# Patient Record
Sex: FEMALE | Race: WHITE | NOT HISPANIC OR LATINO | Employment: OTHER | URBAN - METROPOLITAN AREA
[De-identification: names, ages, dates, MRNs, and addresses within clinical notes are randomized per-mention and may not be internally consistent; named-entity substitution may affect disease eponyms.]

---

## 2017-02-09 RX ORDER — METHIMAZOLE 10 MG/1
TABLET ORAL
Qty: 60 TABLET | Refills: 1 | Status: SHIPPED | OUTPATIENT
Start: 2017-02-09 | End: 2017-02-22 | Stop reason: SDUPTHER

## 2017-02-17 ENCOUNTER — OFFICE VISIT (OUTPATIENT)
Dept: ENDOCRINOLOGY | Age: 62
End: 2017-02-17

## 2017-02-17 VITALS
WEIGHT: 218.6 LBS | BODY MASS INDEX: 40.23 KG/M2 | OXYGEN SATURATION: 92 % | DIASTOLIC BLOOD PRESSURE: 62 MMHG | HEART RATE: 93 BPM | HEIGHT: 62 IN | SYSTOLIC BLOOD PRESSURE: 112 MMHG

## 2017-02-17 DIAGNOSIS — E05.90 HYPERTHYROIDISM: Primary | ICD-10-CM

## 2017-02-17 DIAGNOSIS — R53.82 CHRONIC FATIGUE: ICD-10-CM

## 2017-02-17 DIAGNOSIS — Z79.52 CURRENT USE OF STEROID MEDICATION: ICD-10-CM

## 2017-02-17 DIAGNOSIS — M81.0 POST-MENOPAUSAL OSTEOPOROSIS: ICD-10-CM

## 2017-02-17 PROCEDURE — 99214 OFFICE O/P EST MOD 30 MIN: CPT | Performed by: INTERNAL MEDICINE

## 2017-02-17 RX ORDER — ZOLEDRONIC ACID 5 MG/100ML
5 INJECTION, SOLUTION INTRAVENOUS ONCE
Status: DISCONTINUED | OUTPATIENT
Start: 2017-02-17 | End: 2019-01-01

## 2017-02-17 NOTE — PROGRESS NOTES
61 y.o.    Patient Care Team:  Miriam Mercado Reyes, MD as PCP - General (Family Medicine)    Chief Complaint:        F/U HYPERTHYROIDISM.  NO RECENT LABS.  Subjective     HPI     Patient is a 61-year-old white female with a past history of hyperthyroidism and osteoporosis came for followup.  Hyperthyroidism.  Patient is currently taking methimazole 10 mg twice daily.  She reported significant improvement in her symptoms. She feeling better in general. He did, sweating, is decreased. Heat intolerance, is diminished. Tremors are also decreased.  Postmenopausal osteoporosis.  Patient is currently on Fosamax for the past few months. She reports significant gastroesophageal reflux symptoms for the past several weeks and is unable to take Fosamax.  Severe COPD with steroid dependence.  Patient is currently taking prednisone 10 mg daily, which also increases her risk for osteoporosis.    Chronic fatigue.  Patient constantly experiences Fatigue and has multiple reasons for that      Interval History       Summary  Patient reported that in August 2016 she was admitted to the hospital for severe tachycardia and was noted to have atrial fibrillation with rapid ventricular response. She was treated in hospital was started on Coumadin at that time.  She also has severe COPD and frequently received steroids. She is currently on continuous 10 mg prednisone dose daily  Patient reports occasional dysphagia, altered voice feels hot all the time and sweaty. Denies any diarrhea but frequent bowel movements. She is still constipated before that.  She also had swelling of the legs. There was no prior history of hypothyroidism.     Patient had menopause since age 44.  She did not take any Estrogens     Patient reports tremors, sweating, palpitations, for the past few years.  She also reported that she has been on prednisone 10 mg daily for at least 2-3 years.  She is currently not taking any vitamin D or calcium or bisphosphonates to  protect against osteoporosis. Patient has not had a DEXA scan in the past few years.     Patient's maternal aunt apparently has hypothyroidism.       The following portions of the patient's history were reviewed and updated as appropriate: allergies, current medications, past family history, past medical history, past social history, past surgical history and problem list.    Past Medical History   Diagnosis Date   • Anxiety and depression    • Hyperlipidemia      History reviewed. No pertinent family history.  Social History     Social History   • Marital status: Unknown     Spouse name: N/A   • Number of children: N/A   • Years of education: N/A     Occupational History   • Not on file.     Social History Main Topics   • Smoking status: Not on file   • Smokeless tobacco: Not on file   • Alcohol use Not on file   • Drug use: Not on file   • Sexual activity: Not on file     Other Topics Concern   • Not on file     Social History Narrative     No Known Allergies    Current Outpatient Prescriptions:   •  alendronate (FOSAMAX) 70 MG tablet, Take 1 tablet by mouth Every 7 (Seven) Days., Disp: 4 tablet, Rfl: 4  •  aspirin (aspirin EC) 81 MG EC tablet, Take 81 mg by mouth., Disp: , Rfl:   •  diltiaZEM (TIAZAC) 300 MG 24 hr capsule, , Disp: , Rfl:   •  escitalopram (LEXAPRO) 10 MG tablet, , Disp: , Rfl:   •  guaiFENesin (MUCINEX) 600 MG 12 hr tablet, Take 600 mg by mouth., Disp: , Rfl:   •  ipratropium-albuterol (DUO-NEB) 0.5-2.5 mg/mL nebulizer, Inhale 3 mL Every 4 (Four) Hours., Disp: , Rfl:   •  Loratadine 10 MG capsule, Take 10 mg by mouth., Disp: , Rfl:   •  methIMAzole (TAPAZOLE) 10 MG tablet, TAKE ONE TABLET BY MOUTH TWICE A DAY WITH MEALS FOR THYROID, Disp: 60 tablet, Rfl: 1  •  predniSONE (DELTASONE) 20 MG tablet, , Disp: , Rfl:   •  VENTOLIN  (90 BASE) MCG/ACT inhaler, , Disp: , Rfl:   •  warfarin (COUMADIN) 5 MG tablet, , Disp: , Rfl:         Review of Systems   Constitutional: Negative for chills,  "fatigue and fever.   Cardiovascular: Negative for chest pain and palpitations.   Gastrointestinal: Positive for constipation. Negative for abdominal pain, diarrhea, nausea and vomiting.   Endocrine: Negative for cold intolerance and heat intolerance.   All other systems reviewed and are negative.      Objective       Vitals:    02/17/17 1411   BP: 112/62   Pulse: 93   SpO2: 92%   Weight: 218 lb 9.6 oz (99.2 kg)   Height: 62\" (157.5 cm)     Body mass index is 39.98 kg/(m^2).      Physical Exam   Constitutional: She is oriented to person, place, and time. She appears well-developed and well-nourished.   HENT:   Head: Normocephalic and atraumatic.   Eyes: EOM are normal. Pupils are equal, round, and reactive to light.   No proptosis or lid lag noted   Neck: Normal range of motion. Neck supple. Thyromegaly (mild thyromegaly noted nontender, moving on swallowing) present.   Cardiovascular: Normal rate, regular rhythm, normal heart sounds and intact distal pulses.    Pulmonary/Chest: Effort normal and breath sounds normal.   Abdominal: Soft. Bowel sounds are normal. She exhibits distension. There is no tenderness.   Musculoskeletal: Normal range of motion. She exhibits no edema.   Lymphadenopathy:     She has no cervical adenopathy.   Neurological: She is alert and oriented to person, place, and time. She has normal reflexes.   Skin: Skin is warm and dry. No rash noted.   Psychiatric: She has a normal mood and affect. Her behavior is normal.   Nursing note and vitals reviewed.    Results Review:     I reviewed the patient's new clinical results.    Medical records reviewed  Summary:      Office Visit on 11/07/2016   Component Date Value Ref Range Status   • Glucose 11/07/2016 107* 65 - 99 mg/dL Final   • BUN 11/07/2016 7  6 - 20 mg/dL Final   • Creatinine 11/07/2016 0.68  0.57 - 1.00 mg/dL Final   • eGFR Non  Am 11/07/2016 89  >60 mL/min/1.73 Final   • eGFR African Am 11/07/2016 107  >60 mL/min/1.73 Final   • " BUN/Creatinine Ratio 11/07/2016 10.3  7.0 - 25.0 Final   • Sodium 11/07/2016 141  136 - 145 mmol/L Final   • Potassium 11/07/2016 4.8  3.5 - 5.2 mmol/L Final   • Chloride 11/07/2016 97* 98 - 107 mmol/L Final   • Total CO2 11/07/2016 27.6  22.0 - 29.0 mmol/L Final   • Calcium 11/07/2016 10.2  8.6 - 10.5 mg/dL Final   • Total Protein 11/07/2016 7.0  6.0 - 8.5 g/dL Final   • Albumin 11/07/2016 4.60  3.50 - 5.20 g/dL Final   • Globulin 11/07/2016 2.4  gm/dL Final   • A/G Ratio 11/07/2016 1.9  g/dL Final   • Total Bilirubin 11/07/2016 0.2  0.1 - 1.2 mg/dL Final   • Alkaline Phosphatase 11/07/2016 93  39 - 117 U/L Final   • AST (SGOT) 11/07/2016 13  1 - 32 U/L Final   • ALT (SGPT) 11/07/2016 17  1 - 33 U/L Final   • PTH, Intact 11/07/2016 29  15 - 65 pg/mL Final   • Free T4 11/07/2016 1.96* 0.93 - 1.70 ng/dL Final   • T3, Total 11/07/2016 253.0* 80.0 - 200.0 ng/dL Final   • TSH 11/07/2016 <0.010* 0.270 - 4.200 mIU/mL Final   • Thyroid Stimulating Immunoglobulin 11/07/2016 664* 0 - 139 % Final   • Thyroid Peroxidase Antibody 11/07/2016 9  0 - 34 IU/mL Final   • 25 Hydroxy, Vitamin D 11/07/2016 19.1* 30.0 - 100.0 ng/mL Final    Comment: Reference Range for Total Vitamin D 25(OH)  Deficiency    <20.0 ng/mL  Insufficiency 21-29 ng/mL  Sufficiency    ng/mL  Toxicity      >100 ng/ml          No results found for: HGBA1C  Lab Results   Component Value Date    CREATININE 0.68 11/07/2016     Imaging Results (most recent)     None                Assessment and Plan:    Leonor was seen today for hyperthyroidism.    Diagnoses and all orders for this visit:    Hyperthyroidism  -     T4, Free  -     TSH  -     Comprehensive Metabolic Panel  -     CBC & Differential    Post-menopausal osteoporosis  -     T4, Free  -     TSH  -     Comprehensive Metabolic Panel  -     CBC & Differential    Current use of steroid medication  -     T4, Free  -     TSH  -     Comprehensive Metabolic Panel  -     CBC & Differential    Chronic  fatigue  -     T4, Free  -     TSH  -     Comprehensive Metabolic Panel  -     CBC & Differential      Patient reported that she started feeling better after starting methimazole.  She's currently taking 10 mg twice daily  She reports that heat intolerance has diminished as well as tremors  Her sweating episodes have also diminished significantly  She's feeling much better    She denied any side effects on the medication    She reported that after the first few doses Fosamax started upsetting her stomach a lot.  She is having constant gastroesophageal reflux disease symptoms after taking Fosamax  I think she is ready for Reclast intravenous use  I explained this to the patient she verbalized understanding and is agreeable for that   I will schedule Reclast injection in the infusion center  Patient will get lab testing done today  She will return to follow-up in 3-4 months.    The total time spent for old record and lab review and face- to- face was more than 25 min of which greater than 50% of time was spent on counseling the patient on recommended evaluation and treatment options, instructions for management/treatment and /or follow up  and importance of compliance with chosen management or treatment options    Hector Anton MD. FACE    02/17/17      EMR Dragon / transcription disclaimer:    Much of this encounter note is an electronic transcription/ translation of spoken language to printed text.  Electronic translation of spoken language may permit erroneous, or at times, nonsensical words or phrases to be inadvertently transcribed; although I have reviewed the note for such errors, some may still exist.

## 2017-02-18 LAB
ALBUMIN SERPL-MCNC: 4.4 G/DL (ref 3.5–5.2)
ALBUMIN/GLOB SERPL: 1.5 G/DL
ALP SERPL-CCNC: 94 U/L (ref 39–117)
ALT SERPL-CCNC: 13 U/L (ref 1–33)
AST SERPL-CCNC: 14 U/L (ref 1–32)
BASOPHILS # BLD AUTO: 0.03 10*3/MM3 (ref 0–0.2)
BASOPHILS NFR BLD AUTO: 0.2 % (ref 0–1.5)
BILIRUB SERPL-MCNC: <0.2 MG/DL (ref 0.1–1.2)
BUN SERPL-MCNC: 12 MG/DL (ref 6–20)
BUN/CREAT SERPL: 14.6 (ref 7–25)
CALCIUM SERPL-MCNC: 9.9 MG/DL (ref 8.6–10.5)
CHLORIDE SERPL-SCNC: 99 MMOL/L (ref 98–107)
CO2 SERPL-SCNC: 25.1 MMOL/L (ref 22–29)
CREAT SERPL-MCNC: 0.82 MG/DL (ref 0.57–1)
EOSINOPHIL # BLD AUTO: 0.66 10*3/MM3 (ref 0–0.7)
EOSINOPHIL NFR BLD AUTO: 4.2 % (ref 0.3–6.2)
ERYTHROCYTE [DISTWIDTH] IN BLOOD BY AUTOMATED COUNT: 17.8 % (ref 11.7–13)
GLOBULIN SER CALC-MCNC: 3 GM/DL
GLUCOSE SERPL-MCNC: 122 MG/DL (ref 65–99)
HCT VFR BLD AUTO: 44 % (ref 35.6–45.5)
HGB BLD-MCNC: 13.5 G/DL (ref 11.9–15.5)
IMM GRANULOCYTES # BLD: 0.06 10*3/MM3 (ref 0–0.03)
IMM GRANULOCYTES NFR BLD: 0.4 % (ref 0–0.5)
LYMPHOCYTES # BLD AUTO: 2.73 10*3/MM3 (ref 0.9–4.8)
LYMPHOCYTES NFR BLD AUTO: 17.3 % (ref 19.6–45.3)
MCH RBC QN AUTO: 25.5 PG (ref 26.9–32)
MCHC RBC AUTO-ENTMCNC: 30.7 G/DL (ref 32.4–36.3)
MCV RBC AUTO: 83 FL (ref 80.5–98.2)
MONOCYTES # BLD AUTO: 1.12 10*3/MM3 (ref 0.2–1.2)
MONOCYTES NFR BLD AUTO: 7.1 % (ref 5–12)
NEUTROPHILS # BLD AUTO: 11.16 10*3/MM3 (ref 1.9–8.1)
NEUTROPHILS NFR BLD AUTO: 70.8 % (ref 42.7–76)
PLATELET # BLD AUTO: 483 10*3/MM3 (ref 140–500)
POTASSIUM SERPL-SCNC: 5 MMOL/L (ref 3.5–5.2)
PROT SERPL-MCNC: 7.4 G/DL (ref 6–8.5)
RBC # BLD AUTO: 5.3 10*6/MM3 (ref 3.9–5.2)
SODIUM SERPL-SCNC: 139 MMOL/L (ref 136–145)
T4 FREE SERPL-MCNC: 0.41 NG/DL (ref 0.93–1.7)
TSH SERPL DL<=0.005 MIU/L-ACNC: 2.2 MIU/ML (ref 0.27–4.2)
WBC # BLD AUTO: 15.76 10*3/MM3 (ref 4.5–10.7)

## 2017-02-20 DIAGNOSIS — M81.0 POST-MENOPAUSAL OSTEOPOROSIS: ICD-10-CM

## 2017-02-20 DIAGNOSIS — R53.82 CHRONIC FATIGUE: ICD-10-CM

## 2017-02-20 DIAGNOSIS — Z79.52 CURRENT USE OF STEROID MEDICATION: ICD-10-CM

## 2017-02-20 DIAGNOSIS — E05.90 HYPERTHYROIDISM: Primary | ICD-10-CM

## 2017-02-22 RX ORDER — METHIMAZOLE 10 MG/1
5 TABLET ORAL
Qty: 30 TABLET | Refills: 3 | Status: ON HOLD | OUTPATIENT
Start: 2017-02-22 | End: 2019-01-01

## 2017-02-27 ENCOUNTER — HOSPITAL ENCOUNTER (OUTPATIENT)
Dept: INFUSION THERAPY | Facility: HOSPITAL | Age: 62
Discharge: HOME | End: 2017-02-27
Attending: INTERNAL MEDICINE | Admitting: INTERNAL MEDICINE

## 2017-02-27 VITALS
RESPIRATION RATE: 24 BRPM | HEIGHT: 62 IN | TEMPERATURE: 98 F | SYSTOLIC BLOOD PRESSURE: 132 MMHG | WEIGHT: 218 LBS | OXYGEN SATURATION: 94 % | BODY MASS INDEX: 40.12 KG/M2 | HEART RATE: 82 BPM | DIASTOLIC BLOOD PRESSURE: 76 MMHG

## 2017-02-27 DIAGNOSIS — Z79.52 CURRENT USE OF STEROID MEDICATION: ICD-10-CM

## 2017-02-27 DIAGNOSIS — E05.90 HYPERTHYROIDISM: Primary | ICD-10-CM

## 2017-02-27 DIAGNOSIS — R53.82 CHRONIC FATIGUE: ICD-10-CM

## 2017-02-27 DIAGNOSIS — M81.0 POST-MENOPAUSAL OSTEOPOROSIS: ICD-10-CM

## 2017-02-27 PROCEDURE — 96365 THER/PROPH/DIAG IV INF INIT: CPT

## 2017-02-27 PROCEDURE — 25010000002 ZOLEDRONIC ACID 5 MG/100ML SOLUTION: Performed by: INTERNAL MEDICINE

## 2017-02-27 RX ORDER — ZOLEDRONIC ACID 5 MG/100ML
5 INJECTION, SOLUTION INTRAVENOUS ONCE
OUTPATIENT
Start: 2018-02-27

## 2017-02-27 RX ORDER — ZOLEDRONIC ACID 5 MG/100ML
5 INJECTION, SOLUTION INTRAVENOUS ONCE
Status: COMPLETED | OUTPATIENT
Start: 2017-02-27 | End: 2017-02-27

## 2017-02-27 RX ADMIN — ZOLEDRONIC ACID 5 MG: 5 INJECTION, SOLUTION INTRAVENOUS at 11:02

## 2017-03-16 ENCOUNTER — OFFICE VISIT (OUTPATIENT)
Dept: ENDOCRINOLOGY | Age: 62
End: 2017-03-16

## 2017-03-16 DIAGNOSIS — E05.90 HYPERTHYROIDISM: Primary | ICD-10-CM

## 2017-03-16 PROCEDURE — 76536 US EXAM OF HEAD AND NECK: CPT | Performed by: INTERNAL MEDICINE

## 2017-03-16 NOTE — PROGRESS NOTES
[facility]                                                                                                                                       Ultrasound Thyroid/ Soft tissue Neck Report    [id number]  Leonor Elisabet    1955    [facility] MD:      03/16/2017    INDICATION: Hyperthyroidism    Explanation of Procedure::    Using physician - performed, real-time ultrasound, limited to the thyroid gland and the anterior neck , longitudinal and transverse images were obtained of both lobes and isthmus and power doppler was used to assess the vascularity and the findings are as follows:    Comparison to previous study  No previous study available for comparison  Thyroid size and Measurements: All measurements are expressed as longitudinal X AP X  transverse unless otherwise specified  The thyroid gland is enlarged right lobe greater than left.  Overall echogenicity is hypo-2 isoechoic with heterogeneous appearance  The gland also has a multinodular structure with nodules in both lobes  There are at least 2 nodules in both lobes that are hyperechoic and mixed consistency    Right Lobe: Measures 6.88 cm x 2.15 cm x 2.83 cm    Left lobe: Measures 5.09 cm x 1.47 cm x 1.45 cm    Isthmus: Measures 0.3 cm    Thyroid Parenchyma    Description of Pathology:    Nodules and characteristics    #1 .  There is a hyperechoic nodular area in the lower part of the right lobe measuring 3.7 x 2.97 cm somewhat of a mixed consistency and with increasing vascularity with prominent central flow.  There is also a suggestion of microcalcifications in the nodule superiorly    #2 multiple small nodules noted in the left lobe and the dominant nodule is 0.85 cm x 0.92 cm in size, hyperechoic with central clearance and minimal vascularity    #3 several other smaller nodules noted in the right lobe of less than 5 mm size hypoechoic and cystic in  consistency          Impression  Asymmetrical enlarged thyroid gland with the right lobe measuring 6.88 cm.  The gland has generalized hypoechoic texture with a dominant nodule in the right lobe measuring 3.7 x 2.9 cm.  The nodule itself is hyperechoic and of mixed consistency with increased vascularity to the Center and also with the appearance  of microcalcifications in the nodule superiorly  There is also a nodule in the left lobe of 0.85 x 0.92 cm once again hyperechoic with central clearance without any characteristic vascularity  Several other small nodules of less than 5 mm noted bilaterally    Recommendation / Followup    The dominant nodule in both lobes  are amenable for fine needle aspiration biopsy for further clarification  Recommend fine needle aspiration biopsy

## 2017-03-17 LAB
T4 FREE SERPL-MCNC: 0.59 NG/DL (ref 0.93–1.7)
TSH SERPL DL<=0.005 MIU/L-ACNC: 5.26 MIU/ML (ref 0.27–4.2)

## 2017-05-10 ENCOUNTER — APPOINTMENT (OUTPATIENT)
Dept: WOMENS IMAGING | Facility: HOSPITAL | Age: 62
End: 2017-05-10

## 2017-05-10 PROCEDURE — 77063 BREAST TOMOSYNTHESIS BI: CPT | Performed by: RADIOLOGY

## 2017-05-10 PROCEDURE — G0202 SCR MAMMO BI INCL CAD: HCPCS | Performed by: RADIOLOGY

## 2018-10-03 RX ORDER — METHIMAZOLE 10 MG/1
TABLET ORAL
Qty: 30 TABLET | Refills: 2 | OUTPATIENT
Start: 2018-10-03

## 2019-01-01 ENCOUNTER — APPOINTMENT (OUTPATIENT)
Dept: GENERAL RADIOLOGY | Facility: HOSPITAL | Age: 64
End: 2019-01-01

## 2019-01-01 ENCOUNTER — READMISSION MANAGEMENT (OUTPATIENT)
Dept: CALL CENTER | Facility: HOSPITAL | Age: 64
End: 2019-01-01

## 2019-01-01 ENCOUNTER — HOSPITAL ENCOUNTER (INPATIENT)
Facility: HOSPITAL | Age: 64
LOS: 3 days | Discharge: HOME HEALTH | End: 2019-02-23
Attending: EMERGENCY MEDICINE | Admitting: INTERNAL MEDICINE

## 2019-01-01 ENCOUNTER — TRANSCRIBE ORDERS (OUTPATIENT)
Dept: ADMINISTRATIVE | Facility: HOSPITAL | Age: 64
End: 2019-01-01

## 2019-01-01 ENCOUNTER — HOSPITAL ENCOUNTER (INPATIENT)
Facility: HOSPITAL | Age: 64
LOS: 7 days | Discharge: HOME | End: 2019-12-15
Attending: EMERGENCY MEDICINE | Admitting: INTERNAL MEDICINE

## 2019-01-01 ENCOUNTER — LAB (OUTPATIENT)
Dept: LAB | Facility: HOSPITAL | Age: 64
End: 2019-01-01

## 2019-01-01 VITALS
BODY MASS INDEX: 29.78 KG/M2 | OXYGEN SATURATION: 91 % | HEIGHT: 62 IN | DIASTOLIC BLOOD PRESSURE: 69 MMHG | HEART RATE: 99 BPM | TEMPERATURE: 97.9 F | SYSTOLIC BLOOD PRESSURE: 129 MMHG | WEIGHT: 161.82 LBS | RESPIRATION RATE: 22 BRPM

## 2019-01-01 VITALS
TEMPERATURE: 98.2 F | SYSTOLIC BLOOD PRESSURE: 130 MMHG | BODY MASS INDEX: 31.28 KG/M2 | OXYGEN SATURATION: 97 % | HEIGHT: 61 IN | WEIGHT: 165.7 LBS | HEART RATE: 101 BPM | RESPIRATION RATE: 22 BRPM | DIASTOLIC BLOOD PRESSURE: 67 MMHG

## 2019-01-01 DIAGNOSIS — I48.91 RAPID ATRIAL FIBRILLATION (HCC): Primary | ICD-10-CM

## 2019-01-01 DIAGNOSIS — I48.0 PAROXYSMAL ATRIAL FIBRILLATION WITH RAPID VENTRICULAR RESPONSE (HCC): Chronic | ICD-10-CM

## 2019-01-01 DIAGNOSIS — J96.02 ACUTE RESPIRATORY ACIDOSIS (HCC): ICD-10-CM

## 2019-01-01 DIAGNOSIS — J96.02 ACUTE HYPERCAPNIC RESPIRATORY FAILURE (HCC): ICD-10-CM

## 2019-01-01 DIAGNOSIS — J96.02 ACUTE RESPIRATORY FAILURE WITH HYPOXIA AND HYPERCAPNIA (HCC): Primary | ICD-10-CM

## 2019-01-01 DIAGNOSIS — J96.01 ACUTE RESPIRATORY FAILURE WITH HYPOXIA AND HYPERCAPNIA (HCC): Primary | ICD-10-CM

## 2019-01-01 DIAGNOSIS — J44.1 COPD WITH ACUTE EXACERBATION (HCC): ICD-10-CM

## 2019-01-01 DIAGNOSIS — J96.21 ACUTE ON CHRONIC RESPIRATORY FAILURE WITH HYPOXIA AND HYPERCAPNIA (HCC): ICD-10-CM

## 2019-01-01 DIAGNOSIS — J96.22 ACUTE ON CHRONIC RESPIRATORY FAILURE WITH HYPOXIA AND HYPERCAPNIA (HCC): ICD-10-CM

## 2019-01-01 DIAGNOSIS — Z01.818 PRE-OP TESTING: Primary | ICD-10-CM

## 2019-01-01 DIAGNOSIS — Z01.818 PRE-OP TESTING: ICD-10-CM

## 2019-01-01 DIAGNOSIS — M62.81 MUSCLE WEAKNESS (GENERALIZED): ICD-10-CM

## 2019-01-01 LAB
ALBUMIN SERPL-MCNC: 3.1 G/DL (ref 3.5–5.2)
ALBUMIN SERPL-MCNC: 3.1 G/DL (ref 3.5–5.2)
ALBUMIN SERPL-MCNC: 3.2 G/DL (ref 3.5–5.2)
ALBUMIN SERPL-MCNC: 3.4 G/DL (ref 3.5–5.2)
ALBUMIN SERPL-MCNC: 3.7 G/DL (ref 3.5–5.2)
ALBUMIN SERPL-MCNC: 3.9 G/DL (ref 3.5–5.2)
ALBUMIN/GLOB SERPL: 1 G/DL
ALBUMIN/GLOB SERPL: 1.1 G/DL
ALBUMIN/GLOB SERPL: 1.2 G/DL
ALP SERPL-CCNC: 106 U/L (ref 39–117)
ALP SERPL-CCNC: 56 U/L (ref 39–117)
ALP SERPL-CCNC: 79 U/L (ref 39–117)
ALT SERPL W P-5'-P-CCNC: 18 U/L (ref 1–33)
ALT SERPL W P-5'-P-CCNC: 21 U/L (ref 1–33)
ALT SERPL W P-5'-P-CCNC: 24 U/L (ref 1–33)
ANION GAP SERPL CALCULATED.3IONS-SCNC: 10.4 MMOL/L (ref 5–15)
ANION GAP SERPL CALCULATED.3IONS-SCNC: 11.4 MMOL/L (ref 5–15)
ANION GAP SERPL CALCULATED.3IONS-SCNC: 11.5 MMOL/L (ref 5–15)
ANION GAP SERPL CALCULATED.3IONS-SCNC: 16.9 MMOL/L
ANION GAP SERPL CALCULATED.3IONS-SCNC: 17.7 MMOL/L (ref 5–15)
ANION GAP SERPL CALCULATED.3IONS-SCNC: 6.5 MMOL/L (ref 5–15)
ANION GAP SERPL CALCULATED.3IONS-SCNC: 8.6 MMOL/L (ref 5–15)
ANION GAP SERPL CALCULATED.3IONS-SCNC: 8.8 MMOL/L (ref 5–15)
ANION GAP SERPL CALCULATED.3IONS-SCNC: 9.7 MMOL/L
ANION GAP SERPL CALCULATED.3IONS-SCNC: 9.9 MMOL/L (ref 5–15)
ANISOCYTOSIS BLD QL: ABNORMAL
ARTERIAL PATENCY WRIST A: POSITIVE
AST SERPL-CCNC: 14 U/L (ref 1–32)
AST SERPL-CCNC: 28 U/L (ref 1–32)
AST SERPL-CCNC: 37 U/L (ref 1–32)
ATMOSPHERIC PRESS: 743.3 MMHG
ATMOSPHERIC PRESS: 750.9 MMHG
ATMOSPHERIC PRESS: 751.1 MMHG
ATMOSPHERIC PRESS: 755.2 MMHG
ATMOSPHERIC PRESS: 755.4 MMHG
ATMOSPHERIC PRESS: 756.3 MMHG
B PARAPERT DNA SPEC QL NAA+PROBE: NOT DETECTED
B PARAPERT DNA SPEC QL NAA+PROBE: NOT DETECTED
B PERT DNA SPEC QL NAA+PROBE: NOT DETECTED
B PERT DNA SPEC QL NAA+PROBE: NOT DETECTED
BACTERIA SPEC AEROBE CULT: NORMAL
BACTERIA SPEC AEROBE CULT: NORMAL
BASE EXCESS BLDA CALC-SCNC: -2.2 MMOL/L (ref 0–2)
BASE EXCESS BLDA CALC-SCNC: -2.4 MMOL/L (ref 0–2)
BASE EXCESS BLDA CALC-SCNC: 0 MMOL/L (ref 0–2)
BASE EXCESS BLDA CALC-SCNC: 2.2 MMOL/L (ref 0–2)
BASE EXCESS BLDA CALC-SCNC: 2.4 MMOL/L (ref 0–2)
BASE EXCESS BLDA CALC-SCNC: 8.2 MMOL/L (ref 0–2)
BASOPHILS # BLD AUTO: 0.01 10*3/MM3 (ref 0–0.2)
BASOPHILS # BLD AUTO: 0.02 10*3/MM3 (ref 0–0.2)
BASOPHILS # BLD AUTO: 0.02 10*3/MM3 (ref 0–0.2)
BASOPHILS # BLD AUTO: 0.04 10*3/MM3 (ref 0–0.2)
BASOPHILS NFR BLD AUTO: 0.1 % (ref 0–1.5)
BASOPHILS NFR BLD AUTO: 0.2 % (ref 0–1.5)
BASOPHILS NFR BLD AUTO: 0.3 % (ref 0–1.5)
BDY SITE: ABNORMAL
BILIRUB SERPL-MCNC: 0.3 MG/DL (ref 0.2–1.2)
BILIRUB SERPL-MCNC: 0.4 MG/DL (ref 0.1–1.2)
BILIRUB SERPL-MCNC: 0.4 MG/DL (ref 0.2–1.2)
BUN BLD-MCNC: 10 MG/DL (ref 8–23)
BUN BLD-MCNC: 16 MG/DL (ref 8–23)
BUN BLD-MCNC: 17 MG/DL (ref 8–23)
BUN BLD-MCNC: 17 MG/DL (ref 8–23)
BUN BLD-MCNC: 18 MG/DL (ref 8–23)
BUN BLD-MCNC: 21 MG/DL (ref 8–23)
BUN BLD-MCNC: 23 MG/DL (ref 8–23)
BUN BLD-MCNC: 24 MG/DL (ref 8–23)
BUN BLD-MCNC: 6 MG/DL (ref 8–23)
BUN BLD-MCNC: 9 MG/DL (ref 8–23)
BUN/CREAT SERPL: 17.6 (ref 7–25)
BUN/CREAT SERPL: 22.7 (ref 7–25)
BUN/CREAT SERPL: 36.4 (ref 7–25)
BUN/CREAT SERPL: 52.2 (ref 7–25)
BUN/CREAT SERPL: 53.1 (ref 7–25)
BUN/CREAT SERPL: 56.7 (ref 7–25)
BUN/CREAT SERPL: 58.3 (ref 7–25)
BUN/CREAT SERPL: 62.2 (ref 7–25)
BUN/CREAT SERPL: 64.3 (ref 7–25)
BUN/CREAT SERPL: 9.1 (ref 7–25)
C PNEUM DNA NPH QL NAA+NON-PROBE: NOT DETECTED
C PNEUM DNA NPH QL NAA+NON-PROBE: NOT DETECTED
CALCIUM SPEC-SCNC: 8.5 MG/DL (ref 8.6–10.5)
CALCIUM SPEC-SCNC: 8.6 MG/DL (ref 8.6–10.5)
CALCIUM SPEC-SCNC: 8.6 MG/DL (ref 8.6–10.5)
CALCIUM SPEC-SCNC: 8.8 MG/DL (ref 8.6–10.5)
CALCIUM SPEC-SCNC: 9 MG/DL (ref 8.6–10.5)
CALCIUM SPEC-SCNC: 9.3 MG/DL (ref 8.6–10.5)
CALCIUM SPEC-SCNC: 9.4 MG/DL (ref 8.6–10.5)
CALCIUM SPEC-SCNC: 9.7 MG/DL (ref 8.6–10.5)
CHLORIDE SERPL-SCNC: 100 MMOL/L (ref 98–107)
CHLORIDE SERPL-SCNC: 100 MMOL/L (ref 98–107)
CHLORIDE SERPL-SCNC: 101 MMOL/L (ref 98–107)
CHLORIDE SERPL-SCNC: 101 MMOL/L (ref 98–107)
CHLORIDE SERPL-SCNC: 102 MMOL/L (ref 98–107)
CHLORIDE SERPL-SCNC: 102 MMOL/L (ref 98–107)
CHLORIDE SERPL-SCNC: 103 MMOL/L (ref 98–107)
CHLORIDE SERPL-SCNC: 104 MMOL/L (ref 98–107)
CHLORIDE SERPL-SCNC: 105 MMOL/L (ref 98–107)
CHLORIDE SERPL-SCNC: 97 MMOL/L (ref 98–107)
CO2 SERPL-SCNC: 23.1 MMOL/L (ref 22–29)
CO2 SERPL-SCNC: 23.3 MMOL/L (ref 22–29)
CO2 SERPL-SCNC: 27.3 MMOL/L (ref 22–29)
CO2 SERPL-SCNC: 27.5 MMOL/L (ref 22–29)
CO2 SERPL-SCNC: 27.6 MMOL/L (ref 22–29)
CO2 SERPL-SCNC: 28.1 MMOL/L (ref 22–29)
CO2 SERPL-SCNC: 28.6 MMOL/L (ref 22–29)
CO2 SERPL-SCNC: 30.2 MMOL/L (ref 22–29)
CO2 SERPL-SCNC: 30.4 MMOL/L (ref 22–29)
CO2 SERPL-SCNC: 30.5 MMOL/L (ref 22–29)
CREAT BLD-MCNC: 0.28 MG/DL (ref 0.57–1)
CREAT BLD-MCNC: 0.3 MG/DL (ref 0.57–1)
CREAT BLD-MCNC: 0.32 MG/DL (ref 0.57–1)
CREAT BLD-MCNC: 0.36 MG/DL (ref 0.57–1)
CREAT BLD-MCNC: 0.37 MG/DL (ref 0.57–1)
CREAT BLD-MCNC: 0.44 MG/DL (ref 0.57–1)
CREAT BLD-MCNC: 0.44 MG/DL (ref 0.57–1)
CREAT BLD-MCNC: 0.46 MG/DL (ref 0.57–1)
CREAT BLD-MCNC: 0.51 MG/DL (ref 0.57–1)
CREAT BLD-MCNC: 0.66 MG/DL (ref 0.57–1)
D-LACTATE SERPL-SCNC: 0.8 MMOL/L (ref 0.5–2)
D-LACTATE SERPL-SCNC: 4.8 MMOL/L (ref 0.5–2)
DEPRECATED RDW RBC AUTO: 39.3 FL (ref 37–54)
DEPRECATED RDW RBC AUTO: 39.7 FL (ref 37–54)
DEPRECATED RDW RBC AUTO: 40.2 FL (ref 37–54)
DEPRECATED RDW RBC AUTO: 40.3 FL (ref 37–54)
DEPRECATED RDW RBC AUTO: 40.3 FL (ref 37–54)
DEPRECATED RDW RBC AUTO: 41.4 FL (ref 37–54)
DEPRECATED RDW RBC AUTO: 41.7 FL (ref 37–54)
DEPRECATED RDW RBC AUTO: 41.8 FL (ref 37–54)
DEPRECATED RDW RBC AUTO: 52.7 FL (ref 37–54)
DIGOXIN SERPL-MCNC: 0.6 NG/ML (ref 0.6–1.2)
DIGOXIN SERPL-MCNC: 1 NG/ML (ref 0.6–1.2)
EOSINOPHIL # BLD AUTO: 0 10*3/MM3 (ref 0–0.4)
EOSINOPHIL # BLD AUTO: 0.15 10*3/MM3 (ref 0–0.4)
EOSINOPHIL # BLD MANUAL: 0.62 10*3/MM3 (ref 0–0.4)
EOSINOPHIL NFR BLD AUTO: 0 % (ref 0.3–6.2)
EOSINOPHIL NFR BLD AUTO: 1.1 % (ref 0.3–6.2)
EOSINOPHIL NFR BLD MANUAL: 4 % (ref 0.3–6.2)
ERYTHROCYTE [DISTWIDTH] IN BLOOD BY AUTOMATED COUNT: 13.9 % (ref 12.3–15.4)
ERYTHROCYTE [DISTWIDTH] IN BLOOD BY AUTOMATED COUNT: 14.1 % (ref 12.3–15.4)
ERYTHROCYTE [DISTWIDTH] IN BLOOD BY AUTOMATED COUNT: 14.1 % (ref 12.3–15.4)
ERYTHROCYTE [DISTWIDTH] IN BLOOD BY AUTOMATED COUNT: 14.2 % (ref 12.3–15.4)
ERYTHROCYTE [DISTWIDTH] IN BLOOD BY AUTOMATED COUNT: 14.4 % (ref 12.3–15.4)
ERYTHROCYTE [DISTWIDTH] IN BLOOD BY AUTOMATED COUNT: 14.6 % (ref 12.3–15.4)
ERYTHROCYTE [DISTWIDTH] IN BLOOD BY AUTOMATED COUNT: 16.8 % (ref 12.3–15.4)
FLUAV H1 2009 PAND RNA NPH QL NAA+PROBE: NOT DETECTED
FLUAV H1 2009 PAND RNA NPH QL NAA+PROBE: NOT DETECTED
FLUAV H1 HA GENE NPH QL NAA+PROBE: NOT DETECTED
FLUAV H1 HA GENE NPH QL NAA+PROBE: NOT DETECTED
FLUAV H3 RNA NPH QL NAA+PROBE: NOT DETECTED
FLUAV H3 RNA NPH QL NAA+PROBE: NOT DETECTED
FLUAV SUBTYP SPEC NAA+PROBE: NOT DETECTED
FLUAV SUBTYP SPEC NAA+PROBE: NOT DETECTED
FLUBV RNA ISLT QL NAA+PROBE: NOT DETECTED
FLUBV RNA ISLT QL NAA+PROBE: NOT DETECTED
GAS FLOW AIRWAY: 4 LPM
GFR SERPL CREATININE-BSD FRML MDRD: 122 ML/MIN/1.73
GFR SERPL CREATININE-BSD FRML MDRD: 138 ML/MIN/1.73
GFR SERPL CREATININE-BSD FRML MDRD: 145 ML/MIN/1.73
GFR SERPL CREATININE-BSD FRML MDRD: 145 ML/MIN/1.73
GFR SERPL CREATININE-BSD FRML MDRD: 91 ML/MIN/1.73
GFR SERPL CREATININE-BSD FRML MDRD: >150 ML/MIN/1.73
GLOBULIN UR ELPH-MCNC: 2.8 GM/DL
GLOBULIN UR ELPH-MCNC: 3.1 GM/DL
GLOBULIN UR ELPH-MCNC: 3.9 GM/DL
GLUCOSE BLD-MCNC: 117 MG/DL (ref 65–99)
GLUCOSE BLD-MCNC: 122 MG/DL (ref 65–99)
GLUCOSE BLD-MCNC: 149 MG/DL (ref 65–99)
GLUCOSE BLD-MCNC: 151 MG/DL (ref 65–99)
GLUCOSE BLD-MCNC: 160 MG/DL (ref 65–99)
GLUCOSE BLD-MCNC: 171 MG/DL (ref 65–99)
GLUCOSE BLD-MCNC: 178 MG/DL (ref 65–99)
GLUCOSE BLD-MCNC: 182 MG/DL (ref 65–99)
GLUCOSE BLD-MCNC: 196 MG/DL (ref 65–99)
GLUCOSE BLD-MCNC: 279 MG/DL (ref 65–99)
GLUCOSE BLDC GLUCOMTR-MCNC: 108 MG/DL (ref 70–130)
GLUCOSE BLDC GLUCOMTR-MCNC: 108 MG/DL (ref 70–130)
GLUCOSE BLDC GLUCOMTR-MCNC: 116 MG/DL (ref 70–130)
GLUCOSE BLDC GLUCOMTR-MCNC: 117 MG/DL (ref 70–130)
GLUCOSE BLDC GLUCOMTR-MCNC: 121 MG/DL (ref 70–130)
GLUCOSE BLDC GLUCOMTR-MCNC: 122 MG/DL (ref 70–130)
GLUCOSE BLDC GLUCOMTR-MCNC: 123 MG/DL (ref 70–130)
GLUCOSE BLDC GLUCOMTR-MCNC: 124 MG/DL (ref 70–130)
GLUCOSE BLDC GLUCOMTR-MCNC: 133 MG/DL (ref 70–130)
GLUCOSE BLDC GLUCOMTR-MCNC: 136 MG/DL (ref 70–130)
GLUCOSE BLDC GLUCOMTR-MCNC: 137 MG/DL (ref 70–130)
GLUCOSE BLDC GLUCOMTR-MCNC: 138 MG/DL (ref 70–130)
GLUCOSE BLDC GLUCOMTR-MCNC: 139 MG/DL (ref 70–130)
GLUCOSE BLDC GLUCOMTR-MCNC: 140 MG/DL (ref 70–130)
GLUCOSE BLDC GLUCOMTR-MCNC: 142 MG/DL (ref 70–130)
GLUCOSE BLDC GLUCOMTR-MCNC: 146 MG/DL (ref 70–130)
GLUCOSE BLDC GLUCOMTR-MCNC: 146 MG/DL (ref 70–130)
GLUCOSE BLDC GLUCOMTR-MCNC: 148 MG/DL (ref 70–130)
GLUCOSE BLDC GLUCOMTR-MCNC: 150 MG/DL (ref 70–130)
GLUCOSE BLDC GLUCOMTR-MCNC: 153 MG/DL (ref 70–130)
GLUCOSE BLDC GLUCOMTR-MCNC: 153 MG/DL (ref 70–130)
GLUCOSE BLDC GLUCOMTR-MCNC: 161 MG/DL (ref 70–130)
GLUCOSE BLDC GLUCOMTR-MCNC: 168 MG/DL (ref 70–130)
GLUCOSE BLDC GLUCOMTR-MCNC: 172 MG/DL (ref 70–130)
GLUCOSE BLDC GLUCOMTR-MCNC: 173 MG/DL (ref 70–130)
GLUCOSE BLDC GLUCOMTR-MCNC: 174 MG/DL (ref 70–130)
GLUCOSE BLDC GLUCOMTR-MCNC: 178 MG/DL (ref 70–130)
GLUCOSE BLDC GLUCOMTR-MCNC: 179 MG/DL (ref 70–130)
GLUCOSE BLDC GLUCOMTR-MCNC: 180 MG/DL (ref 70–130)
GLUCOSE BLDC GLUCOMTR-MCNC: 181 MG/DL (ref 70–130)
GLUCOSE BLDC GLUCOMTR-MCNC: 183 MG/DL (ref 70–130)
GLUCOSE BLDC GLUCOMTR-MCNC: 184 MG/DL (ref 70–130)
GLUCOSE BLDC GLUCOMTR-MCNC: 194 MG/DL (ref 70–130)
GLUCOSE BLDC GLUCOMTR-MCNC: 204 MG/DL (ref 70–130)
GLUCOSE BLDC GLUCOMTR-MCNC: 204 MG/DL (ref 70–130)
GLUCOSE BLDC GLUCOMTR-MCNC: 207 MG/DL (ref 70–130)
GLUCOSE BLDC GLUCOMTR-MCNC: 210 MG/DL (ref 70–130)
GLUCOSE BLDC GLUCOMTR-MCNC: 222 MG/DL (ref 70–130)
GLUCOSE BLDC GLUCOMTR-MCNC: 234 MG/DL (ref 70–130)
GLUCOSE BLDC GLUCOMTR-MCNC: 238 MG/DL (ref 70–130)
GLUCOSE BLDC GLUCOMTR-MCNC: 239 MG/DL (ref 70–130)
GLUCOSE BLDC GLUCOMTR-MCNC: 275 MG/DL (ref 70–130)
GLUCOSE BLDC GLUCOMTR-MCNC: 82 MG/DL (ref 70–130)
GLUCOSE BLDC GLUCOMTR-MCNC: 97 MG/DL (ref 70–130)
HADV DNA SPEC NAA+PROBE: NOT DETECTED
HADV DNA SPEC NAA+PROBE: NOT DETECTED
HCO3 BLDA-SCNC: 24.8 MMOL/L (ref 22–28)
HCO3 BLDA-SCNC: 25.8 MMOL/L (ref 22–28)
HCO3 BLDA-SCNC: 27.3 MMOL/L (ref 22–28)
HCO3 BLDA-SCNC: 29 MMOL/L (ref 22–28)
HCO3 BLDA-SCNC: 30 MMOL/L (ref 22–28)
HCO3 BLDA-SCNC: 36.4 MMOL/L (ref 22–28)
HCOV 229E RNA SPEC QL NAA+PROBE: NOT DETECTED
HCOV 229E RNA SPEC QL NAA+PROBE: NOT DETECTED
HCOV HKU1 RNA SPEC QL NAA+PROBE: NOT DETECTED
HCOV HKU1 RNA SPEC QL NAA+PROBE: NOT DETECTED
HCOV NL63 RNA SPEC QL NAA+PROBE: NOT DETECTED
HCOV NL63 RNA SPEC QL NAA+PROBE: NOT DETECTED
HCOV OC43 RNA SPEC QL NAA+PROBE: NOT DETECTED
HCOV OC43 RNA SPEC QL NAA+PROBE: NOT DETECTED
HCT VFR BLD AUTO: 40.6 % (ref 34–46.6)
HCT VFR BLD AUTO: 40.6 % (ref 34–46.6)
HCT VFR BLD AUTO: 42.2 % (ref 34–46.6)
HCT VFR BLD AUTO: 43.5 % (ref 34–46.6)
HCT VFR BLD AUTO: 44 % (ref 34–46.6)
HCT VFR BLD AUTO: 44.2 % (ref 34–46.6)
HCT VFR BLD AUTO: 44.4 % (ref 34–46.6)
HCT VFR BLD AUTO: 45.2 % (ref 34–46.6)
HCT VFR BLD AUTO: 47.6 % (ref 34–46.6)
HGB BLD-MCNC: 12.9 G/DL (ref 12–15.9)
HGB BLD-MCNC: 12.9 G/DL (ref 12–15.9)
HGB BLD-MCNC: 13.7 G/DL (ref 12–15.9)
HGB BLD-MCNC: 13.9 G/DL (ref 12–15.9)
HGB BLD-MCNC: 14 G/DL (ref 12–15.9)
HGB BLD-MCNC: 14 G/DL (ref 12–15.9)
HGB BLD-MCNC: 14.1 G/DL (ref 12–15.9)
HGB BLD-MCNC: 14.3 G/DL (ref 12–15.9)
HGB BLD-MCNC: 14.6 G/DL (ref 12–15.9)
HMPV RNA NPH QL NAA+NON-PROBE: NOT DETECTED
HMPV RNA NPH QL NAA+NON-PROBE: NOT DETECTED
HOLD SPECIMEN: NORMAL
HOLD SPECIMEN: NORMAL
HOROWITZ INDEX BLD+IHG-RTO: 100 %
HOROWITZ INDEX BLD+IHG-RTO: 30 %
HOROWITZ INDEX BLD+IHG-RTO: 30 %
HOROWITZ INDEX BLD+IHG-RTO: 35 %
HOROWITZ INDEX BLD+IHG-RTO: 40 %
HPIV1 RNA SPEC QL NAA+PROBE: NOT DETECTED
HPIV1 RNA SPEC QL NAA+PROBE: NOT DETECTED
HPIV2 RNA SPEC QL NAA+PROBE: NOT DETECTED
HPIV2 RNA SPEC QL NAA+PROBE: NOT DETECTED
HPIV3 RNA NPH QL NAA+PROBE: NOT DETECTED
HPIV3 RNA NPH QL NAA+PROBE: NOT DETECTED
HPIV4 P GENE NPH QL NAA+PROBE: NOT DETECTED
HPIV4 P GENE NPH QL NAA+PROBE: NOT DETECTED
IMM GRANULOCYTES # BLD AUTO: 0.03 10*3/MM3 (ref 0–0.05)
IMM GRANULOCYTES # BLD AUTO: 0.05 10*3/MM3 (ref 0–0.05)
IMM GRANULOCYTES # BLD AUTO: 0.06 10*3/MM3 (ref 0–0.05)
IMM GRANULOCYTES # BLD AUTO: 0.07 10*3/MM3 (ref 0–0.05)
IMM GRANULOCYTES # BLD AUTO: 0.1 10*3/MM3 (ref 0–0.05)
IMM GRANULOCYTES # BLD AUTO: 0.11 10*3/MM3 (ref 0–0.05)
IMM GRANULOCYTES NFR BLD AUTO: 0.4 % (ref 0–0.5)
IMM GRANULOCYTES NFR BLD AUTO: 0.5 % (ref 0–0.5)
IMM GRANULOCYTES NFR BLD AUTO: 0.6 % (ref 0–0.5)
IMM GRANULOCYTES NFR BLD AUTO: 0.7 % (ref 0–0.5)
INR PPP: 1.76 (ref 0.9–1.1)
INR PPP: 1.97 (ref 0.9–1.1)
INR PPP: 2.1 (ref 0.9–1.1)
INR PPP: 2.18 (ref 0.9–1.1)
INR PPP: 2.25 (ref 0.9–1.1)
INR PPP: 2.3 (ref 0.9–1.1)
INR PPP: 2.41 (ref 0.9–1.1)
INR PPP: 2.69 (ref 0.9–1.1)
INR PPP: 2.76 (ref 0.9–1.1)
INR PPP: 2.9 (ref 0.9–1.1)
INR PPP: 4.43 (ref 0.9–1.1)
INR PPP: 5.49 (ref 0.9–1.1)
LYMPHOCYTES # BLD AUTO: 0.52 10*3/MM3 (ref 0.7–3.1)
LYMPHOCYTES # BLD AUTO: 0.57 10*3/MM3 (ref 0.7–3.1)
LYMPHOCYTES # BLD AUTO: 0.6 10*3/MM3 (ref 0.7–3.1)
LYMPHOCYTES # BLD AUTO: 0.61 10*3/MM3 (ref 0.7–3.1)
LYMPHOCYTES # BLD AUTO: 0.65 10*3/MM3 (ref 0.7–3.1)
LYMPHOCYTES # BLD AUTO: 0.76 10*3/MM3 (ref 0.7–3.1)
LYMPHOCYTES # BLD AUTO: 0.77 10*3/MM3 (ref 0.7–3.1)
LYMPHOCYTES # BLD AUTO: 2.8 10*3/MM3 (ref 0.7–3.1)
LYMPHOCYTES # BLD MANUAL: 5.48 10*3/MM3 (ref 0.7–3.1)
LYMPHOCYTES NFR BLD AUTO: 19.8 % (ref 19.6–45.3)
LYMPHOCYTES NFR BLD AUTO: 4.3 % (ref 19.6–45.3)
LYMPHOCYTES NFR BLD AUTO: 5 % (ref 19.6–45.3)
LYMPHOCYTES NFR BLD AUTO: 5.5 % (ref 19.6–45.3)
LYMPHOCYTES NFR BLD AUTO: 5.8 % (ref 19.6–45.3)
LYMPHOCYTES NFR BLD AUTO: 6.1 % (ref 19.6–45.3)
LYMPHOCYTES NFR BLD AUTO: 7.3 % (ref 19.6–45.3)
LYMPHOCYTES NFR BLD AUTO: 7.6 % (ref 19.6–45.3)
LYMPHOCYTES NFR BLD MANUAL: 13.1 % (ref 5–12)
LYMPHOCYTES NFR BLD MANUAL: 35.4 % (ref 19.6–45.3)
M PNEUMO IGG SER IA-ACNC: NOT DETECTED
M PNEUMO IGG SER IA-ACNC: NOT DETECTED
MAGNESIUM SERPL-MCNC: 2.1 MG/DL (ref 1.6–2.4)
MCH RBC QN AUTO: 24.6 PG (ref 26.6–33)
MCH RBC QN AUTO: 25.1 PG (ref 26.6–33)
MCH RBC QN AUTO: 25.3 PG (ref 26.6–33)
MCH RBC QN AUTO: 25.3 PG (ref 26.6–33)
MCH RBC QN AUTO: 25.4 PG (ref 26.6–33)
MCH RBC QN AUTO: 26 PG (ref 26.6–33)
MCH RBC QN AUTO: 26.1 PG (ref 26.6–33)
MCHC RBC AUTO-ENTMCNC: 29.4 G/DL (ref 31.5–35.7)
MCHC RBC AUTO-ENTMCNC: 30.8 G/DL (ref 31.5–35.7)
MCHC RBC AUTO-ENTMCNC: 31.5 G/DL (ref 31.5–35.7)
MCHC RBC AUTO-ENTMCNC: 31.8 G/DL (ref 31.5–35.7)
MCHC RBC AUTO-ENTMCNC: 31.8 G/DL (ref 31.5–35.7)
MCHC RBC AUTO-ENTMCNC: 31.9 G/DL (ref 31.5–35.7)
MCHC RBC AUTO-ENTMCNC: 32.5 G/DL (ref 31.5–35.7)
MCHC RBC AUTO-ENTMCNC: 32.9 G/DL (ref 31.5–35.7)
MCHC RBC AUTO-ENTMCNC: 33.2 G/DL (ref 31.5–35.7)
MCV RBC AUTO: 78.3 FL (ref 79–97)
MCV RBC AUTO: 78.4 FL (ref 79–97)
MCV RBC AUTO: 79.4 FL (ref 79–97)
MCV RBC AUTO: 79.4 FL (ref 79–97)
MCV RBC AUTO: 79.7 FL (ref 79–97)
MCV RBC AUTO: 79.8 FL (ref 79–97)
MCV RBC AUTO: 80 FL (ref 79–97)
MCV RBC AUTO: 80.1 FL (ref 79–97)
MCV RBC AUTO: 86.4 FL (ref 79–97)
METAMYELOCYTES NFR BLD MANUAL: 2 % (ref 0–0)
MICROCYTES BLD QL: ABNORMAL
MODALITY: ABNORMAL
MONOCYTES # BLD AUTO: 0.42 10*3/MM3 (ref 0.1–0.9)
MONOCYTES # BLD AUTO: 0.51 10*3/MM3 (ref 0.1–0.9)
MONOCYTES # BLD AUTO: 0.58 10*3/MM3 (ref 0.1–0.9)
MONOCYTES # BLD AUTO: 0.59 10*3/MM3 (ref 0.1–0.9)
MONOCYTES # BLD AUTO: 0.61 10*3/MM3 (ref 0.1–0.9)
MONOCYTES # BLD AUTO: 0.69 10*3/MM3 (ref 0.1–0.9)
MONOCYTES # BLD AUTO: 0.76 10*3/MM3 (ref 0.1–0.9)
MONOCYTES # BLD AUTO: 1.19 10*3/MM3 (ref 0.1–0.9)
MONOCYTES # BLD AUTO: 2.03 10*3/MM3 (ref 0.1–0.9)
MONOCYTES NFR BLD AUTO: 3.5 % (ref 5–12)
MONOCYTES NFR BLD AUTO: 4.2 % (ref 5–12)
MONOCYTES NFR BLD AUTO: 4.6 % (ref 5–12)
MONOCYTES NFR BLD AUTO: 6 % (ref 5–12)
MONOCYTES NFR BLD AUTO: 6.3 % (ref 5–12)
MONOCYTES NFR BLD AUTO: 7.1 % (ref 5–12)
MONOCYTES NFR BLD AUTO: 7.3 % (ref 5–12)
MONOCYTES NFR BLD AUTO: 8.4 % (ref 5–12)
MYELOCYTES NFR BLD MANUAL: 3 % (ref 0–0)
NEUTROPHILS # BLD AUTO: 10.76 10*3/MM3 (ref 1.7–7)
NEUTROPHILS # BLD AUTO: 10.94 10*3/MM3 (ref 1.7–7)
NEUTROPHILS # BLD AUTO: 10.99 10*3/MM3 (ref 1.7–7)
NEUTROPHILS # BLD AUTO: 13.52 10*3/MM3 (ref 1.7–7)
NEUTROPHILS # BLD AUTO: 6.56 10*3/MM3 (ref 1.4–7)
NEUTROPHILS # BLD AUTO: 6.82 10*3/MM3 (ref 1.7–7)
NEUTROPHILS # BLD AUTO: 6.97 10*3/MM3 (ref 1.7–7)
NEUTROPHILS # BLD AUTO: 8.5 10*3/MM3 (ref 1.7–7)
NEUTROPHILS # BLD AUTO: 9.87 10*3/MM3 (ref 1.7–7)
NEUTROPHILS NFR BLD AUTO: 69.7 % (ref 42.7–76)
NEUTROPHILS NFR BLD AUTO: 84.6 % (ref 42.7–76)
NEUTROPHILS NFR BLD AUTO: 84.9 % (ref 42.7–76)
NEUTROPHILS NFR BLD AUTO: 87.1 % (ref 42.7–76)
NEUTROPHILS NFR BLD AUTO: 87.1 % (ref 42.7–76)
NEUTROPHILS NFR BLD AUTO: 89.6 % (ref 42.7–76)
NEUTROPHILS NFR BLD AUTO: 90.4 % (ref 42.7–76)
NEUTROPHILS NFR BLD AUTO: 90.8 % (ref 42.7–76)
NEUTROPHILS NFR BLD MANUAL: 42.4 % (ref 42.7–76)
NRBC BLD AUTO-RTO: 0 /100 WBC (ref 0–0.2)
NRBC BLD AUTO-RTO: 0.1 /100 WBC (ref 0–0.2)
NRBC BLD AUTO-RTO: 0.1 /100 WBC (ref 0–0.2)
NT-PROBNP SERPL-MCNC: 242.3 PG/ML (ref 5–900)
NT-PROBNP SERPL-MCNC: 3817 PG/ML (ref 0–900)
O2 A-A PPRESDIFF RESPIRATORY: 0.4 MMHG
O2 A-A PPRESDIFF RESPIRATORY: 0.5 MMHG
O2 A-A PPRESDIFF RESPIRATORY: 0.8 MMHG
OVALOCYTES BLD QL SMEAR: ABNORMAL
PCO2 BLDA: 40 MM HG (ref 35–45)
PCO2 BLDA: 51.8 MM HG (ref 35–45)
PCO2 BLDA: 56.6 MM HG (ref 35–45)
PCO2 BLDA: 57.2 MM HG (ref 35–45)
PCO2 BLDA: 62.9 MM HG (ref 35–45)
PCO2 BLDA: 67 MM HG (ref 35–45)
PEEP RESPIRATORY: 6 CM[H2O]
PEEP RESPIRATORY: 6 CM[H2O]
PH BLDA: 7.22 PH UNITS (ref 7.35–7.45)
PH BLDA: 7.27 PH UNITS (ref 7.35–7.45)
PH BLDA: 7.33 PH UNITS (ref 7.35–7.45)
PH BLDA: 7.36 PH UNITS (ref 7.35–7.45)
PH BLDA: 7.37 PH UNITS (ref 7.35–7.45)
PH BLDA: 7.4 PH UNITS (ref 7.35–7.45)
PHOSPHATE SERPL-MCNC: 3.1 MG/DL (ref 2.5–4.5)
PHOSPHATE SERPL-MCNC: 3.2 MG/DL (ref 2.5–4.5)
PHOSPHATE SERPL-MCNC: 3.3 MG/DL (ref 2.5–4.5)
PHOSPHATE SERPL-MCNC: 3.4 MG/DL (ref 2.5–4.5)
PHOSPHATE SERPL-MCNC: 3.5 MG/DL (ref 2.5–4.5)
PLAT MORPH BLD: NORMAL
PLATELET # BLD AUTO: 285 10*3/MM3 (ref 140–450)
PLATELET # BLD AUTO: 292 10*3/MM3 (ref 140–450)
PLATELET # BLD AUTO: 293 10*3/MM3 (ref 140–450)
PLATELET # BLD AUTO: 300 10*3/MM3 (ref 140–450)
PLATELET # BLD AUTO: 300 10*3/MM3 (ref 140–450)
PLATELET # BLD AUTO: 307 10*3/MM3 (ref 140–450)
PLATELET # BLD AUTO: 316 10*3/MM3 (ref 140–450)
PLATELET # BLD AUTO: 367 10*3/MM3 (ref 140–450)
PLATELET # BLD AUTO: 521 10*3/MM3 (ref 140–450)
PMV BLD AUTO: 10 FL (ref 6–12)
PMV BLD AUTO: 10.1 FL (ref 6–12)
PMV BLD AUTO: 10.2 FL (ref 6–12)
PMV BLD AUTO: 10.2 FL (ref 6–12)
PMV BLD AUTO: 10.3 FL (ref 6–12)
PMV BLD AUTO: 10.6 FL (ref 6–12)
PMV BLD AUTO: 9.8 FL (ref 6–12)
PO2 BLDA: 121.6 MM HG (ref 80–100)
PO2 BLDA: 566.4 MM HG (ref 80–100)
PO2 BLDA: 69.5 MM HG (ref 80–100)
PO2 BLDA: 75.4 MM HG (ref 80–100)
PO2 BLDA: 76.5 MM HG (ref 80–100)
PO2 BLDA: 86.3 MM HG (ref 80–100)
POLYCHROMASIA BLD QL SMEAR: ABNORMAL
POTASSIUM BLD-SCNC: 3.6 MMOL/L (ref 3.5–5.2)
POTASSIUM BLD-SCNC: 4 MMOL/L (ref 3.5–5.2)
POTASSIUM BLD-SCNC: 4.1 MMOL/L (ref 3.5–5.2)
POTASSIUM BLD-SCNC: 4.3 MMOL/L (ref 3.5–5.2)
POTASSIUM BLD-SCNC: 4.7 MMOL/L (ref 3.5–5.2)
POTASSIUM BLD-SCNC: 4.7 MMOL/L (ref 3.5–5.2)
POTASSIUM BLD-SCNC: 4.8 MMOL/L (ref 3.5–5.2)
POTASSIUM BLD-SCNC: 5 MMOL/L (ref 3.5–5.2)
PROCALCITONIN SERPL-MCNC: 0.05 NG/ML (ref 0.1–0.25)
PROCALCITONIN SERPL-MCNC: 0.05 NG/ML (ref 0.1–0.25)
PROT SERPL-MCNC: 6 G/DL (ref 6–8.5)
PROT SERPL-MCNC: 6.8 G/DL (ref 6–8.5)
PROT SERPL-MCNC: 7.8 G/DL (ref 6–8.5)
PROTHROMBIN TIME: 20.2 SECONDS (ref 11.7–14.2)
PROTHROMBIN TIME: 22.1 SECONDS (ref 11.7–14.2)
PROTHROMBIN TIME: 23.2 SECONDS (ref 11.7–14.2)
PROTHROMBIN TIME: 23.9 SECONDS (ref 11.7–14.2)
PROTHROMBIN TIME: 24.5 SECONDS (ref 11.7–14.2)
PROTHROMBIN TIME: 25 SECONDS (ref 11.7–14.2)
PROTHROMBIN TIME: 26 SECONDS (ref 11.7–14.2)
PROTHROMBIN TIME: 28.3 SECONDS (ref 11.7–14.2)
PROTHROMBIN TIME: 28.9 SECONDS (ref 11.7–14.2)
PROTHROMBIN TIME: 29.9 SECONDS (ref 11.7–14.2)
PROTHROMBIN TIME: 41.6 SECONDS (ref 11.7–14.2)
PROTHROMBIN TIME: 49.2 SECONDS (ref 11.7–14.2)
RBC # BLD AUTO: 5.07 10*6/MM3 (ref 3.77–5.28)
RBC # BLD AUTO: 5.09 10*6/MM3 (ref 3.77–5.28)
RBC # BLD AUTO: 5.39 10*6/MM3 (ref 3.77–5.28)
RBC # BLD AUTO: 5.48 10*6/MM3 (ref 3.77–5.28)
RBC # BLD AUTO: 5.51 10*6/MM3 (ref 3.77–5.28)
RBC # BLD AUTO: 5.57 10*6/MM3 (ref 3.77–5.28)
RBC # BLD AUTO: 5.57 10*6/MM3 (ref 3.77–5.28)
RBC # BLD AUTO: 5.61 10*6/MM3 (ref 3.77–5.28)
RBC # BLD AUTO: 5.65 10*6/MM3 (ref 3.77–5.28)
RHINOVIRUS RNA SPEC NAA+PROBE: DETECTED
RHINOVIRUS RNA SPEC NAA+PROBE: NOT DETECTED
RSV RNA NPH QL NAA+NON-PROBE: NOT DETECTED
RSV RNA NPH QL NAA+NON-PROBE: NOT DETECTED
SAO2 % BLDCOA: 100 % (ref 92–99)
SAO2 % BLDCOA: 92.4 % (ref 92–99)
SAO2 % BLDCOA: 94.3 % (ref 92–99)
SAO2 % BLDCOA: 95 % (ref 92–99)
SAO2 % BLDCOA: 95.5 % (ref 92–99)
SAO2 % BLDCOA: 98 % (ref 92–99)
SET MECH RESP RATE: 16
SET MECH RESP RATE: 16
SET MECH RESP RATE: 22
SET MECH RESP RATE: 24
SODIUM BLD-SCNC: 137 MMOL/L (ref 136–145)
SODIUM BLD-SCNC: 138 MMOL/L (ref 136–145)
SODIUM BLD-SCNC: 139 MMOL/L (ref 136–145)
SODIUM BLD-SCNC: 139 MMOL/L (ref 136–145)
SODIUM BLD-SCNC: 140 MMOL/L (ref 136–145)
SODIUM BLD-SCNC: 141 MMOL/L (ref 136–145)
SODIUM BLD-SCNC: 142 MMOL/L (ref 136–145)
SODIUM BLD-SCNC: 143 MMOL/L (ref 136–145)
SPHEROCYTES BLD QL SMEAR: ABNORMAL
TOTAL RATE: 22 BREATHS/MINUTE
TOTAL RATE: 28 BREATHS/MINUTE
TOTAL RATE: 29 BREATHS/MINUTE
TOTAL RATE: 34 BREATHS/MINUTE
TOTAL RATE: 36 BREATHS/MINUTE
TOTAL RATE: 41 BREATHS/MINUTE
TROPONIN T SERPL-MCNC: <0.01 NG/ML (ref 0–0.03)
TROPONIN T SERPL-MCNC: <0.01 NG/ML (ref 0–0.03)
VENTILATOR MODE: ABNORMAL
VT ON VENT VENT: 0.49 ML
VT ON VENT VENT: 441 ML
VT ON VENT VENT: 500 ML
VT ON VENT VENT: 500 ML
WBC MORPH BLD: NORMAL
WBC NRBC COR # BLD: 11.9 10*3/MM3 (ref 3.4–10.8)
WBC NRBC COR # BLD: 12.05 10*3/MM3 (ref 3.4–10.8)
WBC NRBC COR # BLD: 12.61 10*3/MM3 (ref 3.4–10.8)
WBC NRBC COR # BLD: 14.15 10*3/MM3 (ref 3.4–10.8)
WBC NRBC COR # BLD: 15.1 10*3/MM3 (ref 3.4–10.8)
WBC NRBC COR # BLD: 15.47 10*3/MM3 (ref 3.4–10.8)
WBC NRBC COR # BLD: 8.06 10*3/MM3 (ref 3.4–10.8)
WBC NRBC COR # BLD: 8.21 10*3/MM3 (ref 3.4–10.8)
WBC NRBC COR # BLD: 9.76 10*3/MM3 (ref 3.4–10.8)

## 2019-01-01 PROCEDURE — 80069 RENAL FUNCTION PANEL: CPT | Performed by: INTERNAL MEDICINE

## 2019-01-01 PROCEDURE — 82962 GLUCOSE BLOOD TEST: CPT

## 2019-01-01 PROCEDURE — 94799 UNLISTED PULMONARY SVC/PX: CPT

## 2019-01-01 PROCEDURE — 93010 ELECTROCARDIOGRAM REPORT: CPT | Performed by: INTERNAL MEDICINE

## 2019-01-01 PROCEDURE — 94660 CPAP INITIATION&MGMT: CPT

## 2019-01-01 PROCEDURE — 63710000001 INSULIN LISPRO (HUMAN) PER 5 UNITS: Performed by: INTERNAL MEDICINE

## 2019-01-01 PROCEDURE — 36600 WITHDRAWAL OF ARTERIAL BLOOD: CPT

## 2019-01-01 PROCEDURE — 80053 COMPREHEN METABOLIC PANEL: CPT

## 2019-01-01 PROCEDURE — 25010000002 METHYLPREDNISOLONE PER 40 MG: Performed by: INTERNAL MEDICINE

## 2019-01-01 PROCEDURE — 71045 X-RAY EXAM CHEST 1 VIEW: CPT

## 2019-01-01 PROCEDURE — 80162 ASSAY OF DIGOXIN TOTAL: CPT | Performed by: INTERNAL MEDICINE

## 2019-01-01 PROCEDURE — 85610 PROTHROMBIN TIME: CPT | Performed by: INTERNAL MEDICINE

## 2019-01-01 PROCEDURE — 85610 PROTHROMBIN TIME: CPT | Performed by: EMERGENCY MEDICINE

## 2019-01-01 PROCEDURE — 84100 ASSAY OF PHOSPHORUS: CPT | Performed by: INTERNAL MEDICINE

## 2019-01-01 PROCEDURE — 63710000001 PREDNISONE PER 1 MG: Performed by: INTERNAL MEDICINE

## 2019-01-01 PROCEDURE — 99285 EMERGENCY DEPT VISIT HI MDM: CPT

## 2019-01-01 PROCEDURE — 5A09357 ASSISTANCE WITH RESPIRATORY VENTILATION, LESS THAN 24 CONSECUTIVE HOURS, CONTINUOUS POSITIVE AIRWAY PRESSURE: ICD-10-PCS | Performed by: INTERNAL MEDICINE

## 2019-01-01 PROCEDURE — 85007 BL SMEAR W/DIFF WBC COUNT: CPT | Performed by: EMERGENCY MEDICINE

## 2019-01-01 PROCEDURE — 84145 PROCALCITONIN (PCT): CPT | Performed by: EMERGENCY MEDICINE

## 2019-01-01 PROCEDURE — 80048 BASIC METABOLIC PNL TOTAL CA: CPT | Performed by: INTERNAL MEDICINE

## 2019-01-01 PROCEDURE — 25010000002 AZITHROMYCIN PER 500 MG: Performed by: INTERNAL MEDICINE

## 2019-01-01 PROCEDURE — 82803 BLOOD GASES ANY COMBINATION: CPT

## 2019-01-01 PROCEDURE — 85025 COMPLETE CBC W/AUTO DIFF WBC: CPT | Performed by: EMERGENCY MEDICINE

## 2019-01-01 PROCEDURE — 80053 COMPREHEN METABOLIC PANEL: CPT | Performed by: EMERGENCY MEDICINE

## 2019-01-01 PROCEDURE — 93005 ELECTROCARDIOGRAM TRACING: CPT | Performed by: INTERNAL MEDICINE

## 2019-01-01 PROCEDURE — 25010000002 METHYLPREDNISOLONE PER 125 MG: Performed by: INTERNAL MEDICINE

## 2019-01-01 PROCEDURE — 94640 AIRWAY INHALATION TREATMENT: CPT

## 2019-01-01 PROCEDURE — 87581 M.PNEUMON DNA AMP PROBE: CPT | Performed by: INTERNAL MEDICINE

## 2019-01-01 PROCEDURE — 25010000002 LORAZEPAM PER 2 MG: Performed by: INTERNAL MEDICINE

## 2019-01-01 PROCEDURE — 85025 COMPLETE CBC W/AUTO DIFF WBC: CPT | Performed by: INTERNAL MEDICINE

## 2019-01-01 PROCEDURE — 94618 PULMONARY STRESS TESTING: CPT

## 2019-01-01 PROCEDURE — 99232 SBSQ HOSP IP/OBS MODERATE 35: CPT | Performed by: NURSE PRACTITIONER

## 2019-01-01 PROCEDURE — 36415 COLL VENOUS BLD VENIPUNCTURE: CPT

## 2019-01-01 PROCEDURE — 99291 CRITICAL CARE FIRST HOUR: CPT

## 2019-01-01 PROCEDURE — 87798 DETECT AGENT NOS DNA AMP: CPT | Performed by: INTERNAL MEDICINE

## 2019-01-01 PROCEDURE — 97110 THERAPEUTIC EXERCISES: CPT

## 2019-01-01 PROCEDURE — 85025 COMPLETE CBC W/AUTO DIFF WBC: CPT

## 2019-01-01 PROCEDURE — 97162 PT EVAL MOD COMPLEX 30 MIN: CPT

## 2019-01-01 PROCEDURE — 93005 ELECTROCARDIOGRAM TRACING: CPT | Performed by: EMERGENCY MEDICINE

## 2019-01-01 PROCEDURE — 83880 ASSAY OF NATRIURETIC PEPTIDE: CPT | Performed by: EMERGENCY MEDICINE

## 2019-01-01 PROCEDURE — 99222 1ST HOSP IP/OBS MODERATE 55: CPT | Performed by: INTERNAL MEDICINE

## 2019-01-01 PROCEDURE — 63710000001 DIPHENHYDRAMINE PER 50 MG: Performed by: INTERNAL MEDICINE

## 2019-01-01 PROCEDURE — 87486 CHLMYD PNEUM DNA AMP PROBE: CPT | Performed by: INTERNAL MEDICINE

## 2019-01-01 PROCEDURE — 87631 RESP VIRUS 3-5 TARGETS: CPT | Performed by: INTERNAL MEDICINE

## 2019-01-01 PROCEDURE — 87040 BLOOD CULTURE FOR BACTERIA: CPT | Performed by: EMERGENCY MEDICINE

## 2019-01-01 PROCEDURE — 83605 ASSAY OF LACTIC ACID: CPT | Performed by: EMERGENCY MEDICINE

## 2019-01-01 PROCEDURE — 99232 SBSQ HOSP IP/OBS MODERATE 35: CPT | Performed by: INTERNAL MEDICINE

## 2019-01-01 PROCEDURE — 25010000002 ONDANSETRON PER 1 MG: Performed by: INTERNAL MEDICINE

## 2019-01-01 PROCEDURE — 84145 PROCALCITONIN (PCT): CPT | Performed by: INTERNAL MEDICINE

## 2019-01-01 PROCEDURE — 25010000002 ONDANSETRON PER 1 MG: Performed by: EMERGENCY MEDICINE

## 2019-01-01 PROCEDURE — 0100U HC BIOFIRE FILMARRAY RESP PANEL 2: CPT | Performed by: INTERNAL MEDICINE

## 2019-01-01 PROCEDURE — 84484 ASSAY OF TROPONIN QUANT: CPT | Performed by: EMERGENCY MEDICINE

## 2019-01-01 PROCEDURE — 83735 ASSAY OF MAGNESIUM: CPT | Performed by: EMERGENCY MEDICINE

## 2019-01-01 RX ORDER — ALBUTEROL SULFATE 2.5 MG/3ML
2.5 SOLUTION RESPIRATORY (INHALATION)
Status: COMPLETED | OUTPATIENT
Start: 2019-01-01 | End: 2019-01-01

## 2019-01-01 RX ORDER — DILTIAZEM HYDROCHLORIDE 300 MG/1
300 CAPSULE, COATED, EXTENDED RELEASE ORAL
Status: DISCONTINUED | OUTPATIENT
Start: 2019-01-01 | End: 2019-01-01 | Stop reason: HOSPADM

## 2019-01-01 RX ORDER — PREDNISONE 1 MG/1
TABLET ORAL
Qty: 12 TABLET | Refills: 0 | Status: SHIPPED | OUTPATIENT
Start: 2019-01-01

## 2019-01-01 RX ORDER — METHIMAZOLE 5 MG/1
5 TABLET ORAL 2 TIMES DAILY
Status: DISCONTINUED | OUTPATIENT
Start: 2019-01-01 | End: 2019-01-01 | Stop reason: HOSPADM

## 2019-01-01 RX ORDER — IPRATROPIUM BROMIDE AND ALBUTEROL SULFATE 2.5; .5 MG/3ML; MG/3ML
3 SOLUTION RESPIRATORY (INHALATION) ONCE
Status: COMPLETED | OUTPATIENT
Start: 2019-01-01 | End: 2019-01-01

## 2019-01-01 RX ORDER — METOPROLOL SUCCINATE 25 MG/1
25 TABLET, EXTENDED RELEASE ORAL DAILY
Status: ON HOLD | COMMUNITY
End: 2019-01-01

## 2019-01-01 RX ORDER — ESCITALOPRAM OXALATE 20 MG/1
20 TABLET ORAL DAILY
Status: DISCONTINUED | OUTPATIENT
Start: 2019-01-01 | End: 2019-01-01 | Stop reason: HOSPADM

## 2019-01-01 RX ORDER — METOPROLOL SUCCINATE 25 MG/1
25 TABLET, EXTENDED RELEASE ORAL EVERY 12 HOURS SCHEDULED
Status: DISCONTINUED | OUTPATIENT
Start: 2019-01-01 | End: 2019-01-01 | Stop reason: HOSPADM

## 2019-01-01 RX ORDER — IPRATROPIUM BROMIDE AND ALBUTEROL SULFATE 2.5; .5 MG/3ML; MG/3ML
3 SOLUTION RESPIRATORY (INHALATION)
Status: DISCONTINUED | OUTPATIENT
Start: 2019-01-01 | End: 2019-01-01

## 2019-01-01 RX ORDER — ALBUTEROL SULFATE 2.5 MG/3ML
10 SOLUTION RESPIRATORY (INHALATION) CONTINUOUS
Status: DISCONTINUED | OUTPATIENT
Start: 2019-01-01 | End: 2019-01-01

## 2019-01-01 RX ORDER — DIPHENHYDRAMINE HCL 25 MG
25 CAPSULE ORAL NIGHTLY PRN
COMMUNITY

## 2019-01-01 RX ORDER — SODIUM CHLORIDE 0.9 % (FLUSH) 0.9 %
10 SYRINGE (ML) INJECTION AS NEEDED
Status: DISCONTINUED | OUTPATIENT
Start: 2019-01-01 | End: 2019-01-01 | Stop reason: HOSPADM

## 2019-01-01 RX ORDER — ACETAMINOPHEN 325 MG/1
650 TABLET ORAL EVERY 6 HOURS PRN
Status: DISCONTINUED | OUTPATIENT
Start: 2019-01-01 | End: 2019-01-01 | Stop reason: HOSPADM

## 2019-01-01 RX ORDER — ESCITALOPRAM OXALATE 10 MG/1
10 TABLET ORAL ONCE
Status: COMPLETED | OUTPATIENT
Start: 2019-01-01 | End: 2019-01-01

## 2019-01-01 RX ORDER — METHYLPREDNISOLONE SODIUM SUCCINATE 40 MG/ML
40 INJECTION, POWDER, LYOPHILIZED, FOR SOLUTION INTRAMUSCULAR; INTRAVENOUS EVERY 8 HOURS
Status: DISCONTINUED | OUTPATIENT
Start: 2019-01-01 | End: 2019-01-01

## 2019-01-01 RX ORDER — ONDANSETRON 2 MG/ML
4 INJECTION INTRAMUSCULAR; INTRAVENOUS ONCE
Status: COMPLETED | OUTPATIENT
Start: 2019-01-01 | End: 2019-01-01

## 2019-01-01 RX ORDER — ROPINIROLE 1 MG/1
1 TABLET, FILM COATED ORAL NIGHTLY
Status: DISCONTINUED | OUTPATIENT
Start: 2019-01-01 | End: 2019-01-01 | Stop reason: HOSPADM

## 2019-01-01 RX ORDER — ALBUTEROL SULFATE 2.5 MG/3ML
2.5 SOLUTION RESPIRATORY (INHALATION)
Status: DISCONTINUED | OUTPATIENT
Start: 2019-01-01 | End: 2019-01-01 | Stop reason: HOSPADM

## 2019-01-01 RX ORDER — IPRATROPIUM BROMIDE AND ALBUTEROL SULFATE 2.5; .5 MG/3ML; MG/3ML
3 SOLUTION RESPIRATORY (INHALATION)
Status: DISCONTINUED | OUTPATIENT
Start: 2019-01-01 | End: 2019-01-01 | Stop reason: HOSPADM

## 2019-01-01 RX ORDER — WARFARIN SODIUM 1 MG/1
1 TABLET ORAL
Status: COMPLETED | OUTPATIENT
Start: 2019-01-01 | End: 2019-01-01

## 2019-01-01 RX ORDER — SODIUM CHLORIDE 0.9 % (FLUSH) 0.9 %
3-10 SYRINGE (ML) INJECTION AS NEEDED
Status: DISCONTINUED | OUTPATIENT
Start: 2019-01-01 | End: 2019-01-01 | Stop reason: HOSPADM

## 2019-01-01 RX ORDER — ONDANSETRON 2 MG/ML
4 INJECTION INTRAMUSCULAR; INTRAVENOUS EVERY 6 HOURS PRN
Status: DISCONTINUED | OUTPATIENT
Start: 2019-01-01 | End: 2019-01-01 | Stop reason: HOSPADM

## 2019-01-01 RX ORDER — WARFARIN SODIUM 3 MG/1
3 TABLET ORAL
Status: DISCONTINUED | OUTPATIENT
Start: 2019-01-01 | End: 2019-01-01 | Stop reason: HOSPADM

## 2019-01-01 RX ORDER — SODIUM CHLORIDE 0.9 % (FLUSH) 0.9 %
3 SYRINGE (ML) INJECTION EVERY 12 HOURS SCHEDULED
Status: DISCONTINUED | OUTPATIENT
Start: 2019-01-01 | End: 2019-01-01 | Stop reason: HOSPADM

## 2019-01-01 RX ORDER — METOPROLOL SUCCINATE 25 MG/1
25 TABLET, EXTENDED RELEASE ORAL DAILY
Status: DISCONTINUED | OUTPATIENT
Start: 2019-01-01 | End: 2019-01-01

## 2019-01-01 RX ORDER — ASPIRIN 81 MG/1
81 TABLET ORAL DAILY
Status: DISCONTINUED | OUTPATIENT
Start: 2019-01-01 | End: 2019-01-01 | Stop reason: HOSPADM

## 2019-01-01 RX ORDER — WARFARIN SODIUM 6 MG/1
6 TABLET ORAL
Status: DISCONTINUED | OUTPATIENT
Start: 2019-01-01 | End: 2019-01-01 | Stop reason: HOSPADM

## 2019-01-01 RX ORDER — NICOTINE POLACRILEX 4 MG
15 LOZENGE BUCCAL
Status: DISCONTINUED | OUTPATIENT
Start: 2019-01-01 | End: 2019-01-01 | Stop reason: HOSPADM

## 2019-01-01 RX ORDER — PREDNISONE 20 MG/1
20 TABLET ORAL 2 TIMES DAILY
Qty: 8 TABLET | Refills: 0 | Status: SHIPPED | OUTPATIENT
Start: 2019-01-01 | End: 2019-01-01

## 2019-01-01 RX ORDER — WARFARIN SODIUM 3 MG/1
3 TABLET ORAL
Status: DISCONTINUED | OUTPATIENT
Start: 2019-01-01 | End: 2019-01-01

## 2019-01-01 RX ORDER — LORAZEPAM 2 MG/ML
1 INJECTION INTRAMUSCULAR EVERY 4 HOURS PRN
Status: DISCONTINUED | OUTPATIENT
Start: 2019-01-01 | End: 2019-01-01

## 2019-01-01 RX ORDER — WARFARIN SODIUM 6 MG/1
6 TABLET ORAL TAKE AS DIRECTED
COMMUNITY

## 2019-01-01 RX ORDER — DILTIAZEM HCL 90 MG
90 TABLET ORAL 4 TIMES DAILY
COMMUNITY
End: 2019-01-01 | Stop reason: HOSPADM

## 2019-01-01 RX ORDER — WARFARIN SODIUM 2 MG/1
2 TABLET ORAL
Status: COMPLETED | OUTPATIENT
Start: 2019-01-01 | End: 2019-01-01

## 2019-01-01 RX ORDER — DIPHENHYDRAMINE HCL 25 MG
25 CAPSULE ORAL EVERY 6 HOURS PRN
Status: DISCONTINUED | OUTPATIENT
Start: 2019-01-01 | End: 2019-01-01 | Stop reason: HOSPADM

## 2019-01-01 RX ORDER — PREDNISONE 20 MG/1
40 TABLET ORAL
Status: DISCONTINUED | OUTPATIENT
Start: 2019-01-01 | End: 2019-01-01 | Stop reason: HOSPADM

## 2019-01-01 RX ORDER — DEXTROSE MONOHYDRATE 25 G/50ML
25 INJECTION, SOLUTION INTRAVENOUS
Status: DISCONTINUED | OUTPATIENT
Start: 2019-01-01 | End: 2019-01-01 | Stop reason: HOSPADM

## 2019-01-01 RX ORDER — ESCITALOPRAM OXALATE 10 MG/1
10 TABLET ORAL DAILY
Status: DISCONTINUED | OUTPATIENT
Start: 2019-01-01 | End: 2019-01-01

## 2019-01-01 RX ORDER — METHYLPREDNISOLONE SODIUM SUCCINATE 40 MG/ML
40 INJECTION, POWDER, LYOPHILIZED, FOR SOLUTION INTRAMUSCULAR; INTRAVENOUS EVERY 12 HOURS
Status: DISCONTINUED | OUTPATIENT
Start: 2019-01-01 | End: 2019-01-01 | Stop reason: HOSPADM

## 2019-01-01 RX ORDER — WARFARIN SODIUM 3 MG/1
3 TABLET ORAL
Status: COMPLETED | OUTPATIENT
Start: 2019-01-01 | End: 2019-01-01

## 2019-01-01 RX ORDER — GUAIFENESIN 600 MG/1
1200 TABLET, EXTENDED RELEASE ORAL EVERY 12 HOURS SCHEDULED
Status: DISCONTINUED | OUTPATIENT
Start: 2019-01-01 | End: 2019-01-01 | Stop reason: HOSPADM

## 2019-01-01 RX ORDER — ROPINIROLE 1 MG/1
2 TABLET, FILM COATED ORAL NIGHTLY
COMMUNITY

## 2019-01-01 RX ORDER — METOPROLOL SUCCINATE 25 MG/1
25 TABLET, EXTENDED RELEASE ORAL EVERY 12 HOURS SCHEDULED
Qty: 60 TABLET | Refills: 0 | Status: SHIPPED | OUTPATIENT
Start: 2019-01-01

## 2019-01-01 RX ORDER — ESCITALOPRAM OXALATE 10 MG/1
10 TABLET ORAL DAILY
Status: DISCONTINUED | OUTPATIENT
Start: 2019-01-01 | End: 2019-01-01 | Stop reason: HOSPADM

## 2019-01-01 RX ORDER — DIGOXIN 250 MCG
250 TABLET ORAL
COMMUNITY

## 2019-01-01 RX ORDER — METHIMAZOLE 5 MG/1
5 TABLET ORAL
Status: DISCONTINUED | OUTPATIENT
Start: 2019-01-01 | End: 2019-01-01 | Stop reason: HOSPADM

## 2019-01-01 RX ORDER — METHIMAZOLE 10 MG/1
5 TABLET ORAL 2 TIMES DAILY
COMMUNITY

## 2019-01-01 RX ORDER — LORAZEPAM 2 MG/ML
2 INJECTION INTRAMUSCULAR ONCE
Status: COMPLETED | OUTPATIENT
Start: 2019-01-01 | End: 2019-01-01

## 2019-01-01 RX ORDER — WARFARIN SODIUM 5 MG/1
5 TABLET ORAL
Status: DISCONTINUED | OUTPATIENT
Start: 2019-01-01 | End: 2019-01-01

## 2019-01-01 RX ORDER — METHIMAZOLE 5 MG/1
5 TABLET ORAL
Status: DISCONTINUED | OUTPATIENT
Start: 2019-01-01 | End: 2019-01-01

## 2019-01-01 RX ORDER — DILTIAZEM HYDROCHLORIDE 300 MG/1
300 CAPSULE, COATED, EXTENDED RELEASE ORAL
Qty: 30 CAPSULE | Refills: 0 | Status: SHIPPED | OUTPATIENT
Start: 2019-01-01

## 2019-01-01 RX ORDER — CODEINE PHOSPHATE AND GUAIFENESIN 10; 100 MG/5ML; MG/5ML
5 SOLUTION ORAL EVERY 6 HOURS PRN
Status: DISCONTINUED | OUTPATIENT
Start: 2019-01-01 | End: 2019-01-01 | Stop reason: HOSPADM

## 2019-01-01 RX ORDER — WARFARIN SODIUM 2.5 MG/1
2.5 TABLET ORAL
Status: DISCONTINUED | OUTPATIENT
Start: 2019-01-01 | End: 2019-01-01

## 2019-01-01 RX ORDER — DIGOXIN 250 MCG
250 TABLET ORAL
Status: DISCONTINUED | OUTPATIENT
Start: 2019-01-01 | End: 2019-01-01 | Stop reason: HOSPADM

## 2019-01-01 RX ORDER — ALPRAZOLAM 0.5 MG/1
0.5 TABLET ORAL 3 TIMES DAILY PRN
Status: DISCONTINUED | OUTPATIENT
Start: 2019-01-01 | End: 2019-01-01 | Stop reason: HOSPADM

## 2019-01-01 RX ORDER — METHYLPREDNISOLONE SODIUM SUCCINATE 125 MG/2ML
125 INJECTION, POWDER, LYOPHILIZED, FOR SOLUTION INTRAMUSCULAR; INTRAVENOUS EVERY 8 HOURS
Status: DISCONTINUED | OUTPATIENT
Start: 2019-01-01 | End: 2019-01-01

## 2019-01-01 RX ORDER — WARFARIN SODIUM 6 MG/1
6 TABLET ORAL
Status: DISCONTINUED | OUTPATIENT
Start: 2019-01-01 | End: 2019-01-01

## 2019-01-01 RX ORDER — SODIUM CHLORIDE 9 MG/ML
125 INJECTION, SOLUTION INTRAVENOUS CONTINUOUS
Status: DISCONTINUED | OUTPATIENT
Start: 2019-01-01 | End: 2019-01-01

## 2019-01-01 RX ORDER — ALBUTEROL SULFATE 2.5 MG/3ML
2.5 SOLUTION RESPIRATORY (INHALATION) EVERY 4 HOURS PRN
Status: DISCONTINUED | OUTPATIENT
Start: 2019-01-01 | End: 2019-01-01 | Stop reason: HOSPADM

## 2019-01-01 RX ORDER — ESCITALOPRAM OXALATE 20 MG/1
20 TABLET ORAL DAILY
COMMUNITY

## 2019-01-01 RX ADMIN — METOPROLOL SUCCINATE 25 MG: 25 TABLET, FILM COATED, EXTENDED RELEASE ORAL at 08:08

## 2019-01-01 RX ADMIN — IPRATROPIUM BROMIDE AND ALBUTEROL SULFATE 3 ML: 2.5; .5 SOLUTION RESPIRATORY (INHALATION) at 10:58

## 2019-01-01 RX ADMIN — IPRATROPIUM BROMIDE AND ALBUTEROL SULFATE 3 ML: 2.5; .5 SOLUTION RESPIRATORY (INHALATION) at 21:00

## 2019-01-01 RX ADMIN — IPRATROPIUM BROMIDE AND ALBUTEROL SULFATE 3 ML: 2.5; .5 SOLUTION RESPIRATORY (INHALATION) at 14:38

## 2019-01-01 RX ADMIN — GUAIFENESIN 1200 MG: 600 TABLET, EXTENDED RELEASE ORAL at 08:04

## 2019-01-01 RX ADMIN — SODIUM CHLORIDE 5 MG/HR: 900 INJECTION, SOLUTION INTRAVENOUS at 18:45

## 2019-01-01 RX ADMIN — METOPROLOL SUCCINATE 25 MG: 25 TABLET, FILM COATED, EXTENDED RELEASE ORAL at 08:07

## 2019-01-01 RX ADMIN — ESCITALOPRAM OXALATE 20 MG: 20 TABLET, FILM COATED ORAL at 08:20

## 2019-01-01 RX ADMIN — INSULIN LISPRO 2 UNITS: 100 INJECTION, SOLUTION INTRAVENOUS; SUBCUTANEOUS at 22:27

## 2019-01-01 RX ADMIN — PREDNISONE 40 MG: 20 TABLET ORAL at 08:41

## 2019-01-01 RX ADMIN — ESCITALOPRAM 10 MG: 10 TABLET, FILM COATED ORAL at 14:48

## 2019-01-01 RX ADMIN — METHIMAZOLE 5 MG: 5 TABLET ORAL at 08:19

## 2019-01-01 RX ADMIN — SODIUM CHLORIDE, PRESERVATIVE FREE 3 ML: 5 INJECTION INTRAVENOUS at 08:41

## 2019-01-01 RX ADMIN — LORAZEPAM 2 MG: 2 INJECTION INTRAMUSCULAR; INTRAVENOUS at 12:20

## 2019-01-01 RX ADMIN — INSULIN LISPRO 2 UNITS: 100 INJECTION, SOLUTION INTRAVENOUS; SUBCUTANEOUS at 08:08

## 2019-01-01 RX ADMIN — METOPROLOL SUCCINATE 25 MG: 25 TABLET, FILM COATED, EXTENDED RELEASE ORAL at 08:29

## 2019-01-01 RX ADMIN — ESCITALOPRAM 10 MG: 10 TABLET, FILM COATED ORAL at 08:41

## 2019-01-01 RX ADMIN — METHYLPREDNISOLONE SODIUM SUCCINATE 40 MG: 40 INJECTION, POWDER, LYOPHILIZED, FOR SOLUTION INTRAMUSCULAR; INTRAVENOUS at 23:30

## 2019-01-01 RX ADMIN — IPRATROPIUM BROMIDE AND ALBUTEROL SULFATE 3 ML: 2.5; .5 SOLUTION RESPIRATORY (INHALATION) at 11:14

## 2019-01-01 RX ADMIN — LORAZEPAM 1 MG: 2 INJECTION INTRAMUSCULAR; INTRAVENOUS at 20:21

## 2019-01-01 RX ADMIN — SODIUM CHLORIDE, PRESERVATIVE FREE 3 ML: 5 INJECTION INTRAVENOUS at 22:00

## 2019-01-01 RX ADMIN — INSULIN LISPRO 2 UNITS: 100 INJECTION, SOLUTION INTRAVENOUS; SUBCUTANEOUS at 08:19

## 2019-01-01 RX ADMIN — METHYLPREDNISOLONE SODIUM SUCCINATE 40 MG: 40 INJECTION, POWDER, FOR SOLUTION INTRAMUSCULAR; INTRAVENOUS at 20:26

## 2019-01-01 RX ADMIN — METOPROLOL SUCCINATE 25 MG: 25 TABLET, FILM COATED, EXTENDED RELEASE ORAL at 20:25

## 2019-01-01 RX ADMIN — INSULIN LISPRO 4 UNITS: 100 INJECTION, SOLUTION INTRAVENOUS; SUBCUTANEOUS at 12:22

## 2019-01-01 RX ADMIN — AZITHROMYCIN 500 MG: 500 INJECTION, POWDER, LYOPHILIZED, FOR SOLUTION INTRAVENOUS at 08:07

## 2019-01-01 RX ADMIN — GUAIFENESIN 1200 MG: 600 TABLET, EXTENDED RELEASE ORAL at 08:49

## 2019-01-01 RX ADMIN — GUAIFENESIN 1200 MG: 600 TABLET, EXTENDED RELEASE ORAL at 08:29

## 2019-01-01 RX ADMIN — ALPRAZOLAM 0.5 MG: 0.5 TABLET ORAL at 14:57

## 2019-01-01 RX ADMIN — INSULIN LISPRO 2 UNITS: 100 INJECTION, SOLUTION INTRAVENOUS; SUBCUTANEOUS at 22:39

## 2019-01-01 RX ADMIN — GUAIFENESIN 1200 MG: 600 TABLET, EXTENDED RELEASE ORAL at 20:21

## 2019-01-01 RX ADMIN — DIGOXIN 250 MCG: 250 TABLET ORAL at 11:37

## 2019-01-01 RX ADMIN — INSULIN LISPRO 4 UNITS: 100 INJECTION, SOLUTION INTRAVENOUS; SUBCUTANEOUS at 21:27

## 2019-01-01 RX ADMIN — INSULIN LISPRO 2 UNITS: 100 INJECTION, SOLUTION INTRAVENOUS; SUBCUTANEOUS at 12:23

## 2019-01-01 RX ADMIN — DIGOXIN 250 MCG: 250 TABLET ORAL at 11:51

## 2019-01-01 RX ADMIN — IPRATROPIUM BROMIDE AND ALBUTEROL SULFATE 3 ML: 2.5; .5 SOLUTION RESPIRATORY (INHALATION) at 15:17

## 2019-01-01 RX ADMIN — ESCITALOPRAM 10 MG: 10 TABLET, FILM COATED ORAL at 09:03

## 2019-01-01 RX ADMIN — ALPRAZOLAM 0.5 MG: 0.5 TABLET ORAL at 20:57

## 2019-01-01 RX ADMIN — METHYLPREDNISOLONE SODIUM SUCCINATE 40 MG: 40 INJECTION, POWDER, FOR SOLUTION INTRAMUSCULAR; INTRAVENOUS at 20:30

## 2019-01-01 RX ADMIN — ESCITALOPRAM 10 MG: 10 TABLET, FILM COATED ORAL at 08:07

## 2019-01-01 RX ADMIN — LORAZEPAM 1 MG: 2 INJECTION INTRAMUSCULAR; INTRAVENOUS at 02:26

## 2019-01-01 RX ADMIN — ONDANSETRON 4 MG: 2 INJECTION INTRAMUSCULAR; INTRAVENOUS at 03:49

## 2019-01-01 RX ADMIN — SODIUM CHLORIDE 5 MG/HR: 900 INJECTION, SOLUTION INTRAVENOUS at 22:56

## 2019-01-01 RX ADMIN — IPRATROPIUM BROMIDE AND ALBUTEROL SULFATE 3 ML: 2.5; .5 SOLUTION RESPIRATORY (INHALATION) at 11:50

## 2019-01-01 RX ADMIN — DILTIAZEM HYDROCHLORIDE 300 MG: 300 CAPSULE, COATED, EXTENDED RELEASE ORAL at 08:40

## 2019-01-01 RX ADMIN — ROPINIROLE 1 MG: 1 TABLET, FILM COATED ORAL at 20:50

## 2019-01-01 RX ADMIN — IPRATROPIUM BROMIDE AND ALBUTEROL SULFATE 3 ML: 2.5; .5 SOLUTION RESPIRATORY (INHALATION) at 07:14

## 2019-01-01 RX ADMIN — WARFARIN SODIUM 1 MG: 1 TABLET ORAL at 18:21

## 2019-01-01 RX ADMIN — IPRATROPIUM BROMIDE AND ALBUTEROL SULFATE 3 ML: 2.5; .5 SOLUTION RESPIRATORY (INHALATION) at 06:59

## 2019-01-01 RX ADMIN — IPRATROPIUM BROMIDE AND ALBUTEROL SULFATE 3 ML: 2.5; .5 SOLUTION RESPIRATORY (INHALATION) at 14:54

## 2019-01-01 RX ADMIN — DILTIAZEM HYDROCHLORIDE 30 MG: 30 TABLET, FILM COATED ORAL at 18:21

## 2019-01-01 RX ADMIN — METHIMAZOLE 5 MG: 5 TABLET ORAL at 08:49

## 2019-01-01 RX ADMIN — IPRATROPIUM BROMIDE AND ALBUTEROL SULFATE 3 ML: 2.5; .5 SOLUTION RESPIRATORY (INHALATION) at 10:54

## 2019-01-01 RX ADMIN — SODIUM CHLORIDE 5 MG/HR: 900 INJECTION, SOLUTION INTRAVENOUS at 09:32

## 2019-01-01 RX ADMIN — IPRATROPIUM BROMIDE AND ALBUTEROL SULFATE 3 ML: 2.5; .5 SOLUTION RESPIRATORY (INHALATION) at 19:32

## 2019-01-01 RX ADMIN — IPRATROPIUM BROMIDE AND ALBUTEROL SULFATE 3 ML: 2.5; .5 SOLUTION RESPIRATORY (INHALATION) at 06:49

## 2019-01-01 RX ADMIN — METHYLPREDNISOLONE SODIUM SUCCINATE 40 MG: 40 INJECTION, POWDER, LYOPHILIZED, FOR SOLUTION INTRAMUSCULAR; INTRAVENOUS at 22:38

## 2019-01-01 RX ADMIN — GUAIFENESIN AND CODEINE PHOSPHATE 5 ML: 100; 10 SOLUTION ORAL at 21:53

## 2019-01-01 RX ADMIN — DILTIAZEM HYDROCHLORIDE 30 MG: 30 TABLET, FILM COATED ORAL at 23:11

## 2019-01-01 RX ADMIN — WARFARIN SODIUM 2 MG: 2 TABLET ORAL at 16:25

## 2019-01-01 RX ADMIN — GUAIFENESIN AND CODEINE PHOSPHATE 5 ML: 100; 10 SOLUTION ORAL at 03:55

## 2019-01-01 RX ADMIN — IPRATROPIUM BROMIDE AND ALBUTEROL SULFATE 3 ML: 2.5; .5 SOLUTION RESPIRATORY (INHALATION) at 12:07

## 2019-01-01 RX ADMIN — ASPIRIN 81 MG: 81 TABLET, COATED ORAL at 08:20

## 2019-01-01 RX ADMIN — INSULIN LISPRO 2 UNITS: 100 INJECTION, SOLUTION INTRAVENOUS; SUBCUTANEOUS at 20:42

## 2019-01-01 RX ADMIN — WARFARIN SODIUM 3 MG: 3 TABLET ORAL at 16:11

## 2019-01-01 RX ADMIN — GUAIFENESIN AND CODEINE PHOSPHATE 5 ML: 100; 10 SOLUTION ORAL at 03:56

## 2019-01-01 RX ADMIN — METHYLPREDNISOLONE SODIUM SUCCINATE 40 MG: 40 INJECTION, POWDER, LYOPHILIZED, FOR SOLUTION INTRAMUSCULAR; INTRAVENOUS at 23:01

## 2019-01-01 RX ADMIN — METHYLPREDNISOLONE SODIUM SUCCINATE 125 MG: 125 INJECTION, POWDER, FOR SOLUTION INTRAMUSCULAR; INTRAVENOUS at 21:30

## 2019-01-01 RX ADMIN — PREDNISONE 40 MG: 20 TABLET ORAL at 08:19

## 2019-01-01 RX ADMIN — ROPINIROLE 1 MG: 1 TABLET, FILM COATED ORAL at 20:26

## 2019-01-01 RX ADMIN — METOPROLOL SUCCINATE 25 MG: 25 TABLET, FILM COATED, EXTENDED RELEASE ORAL at 08:49

## 2019-01-01 RX ADMIN — AZITHROMYCIN 500 MG: 500 INJECTION, POWDER, LYOPHILIZED, FOR SOLUTION INTRAVENOUS at 08:52

## 2019-01-01 RX ADMIN — DILTIAZEM HYDROCHLORIDE 300 MG: 300 CAPSULE, COATED, EXTENDED RELEASE ORAL at 08:19

## 2019-01-01 RX ADMIN — INSULIN LISPRO 3 UNITS: 100 INJECTION, SOLUTION INTRAVENOUS; SUBCUTANEOUS at 17:38

## 2019-01-01 RX ADMIN — INSULIN LISPRO 2 UNITS: 100 INJECTION, SOLUTION INTRAVENOUS; SUBCUTANEOUS at 17:05

## 2019-01-01 RX ADMIN — INSULIN LISPRO 4 UNITS: 100 INJECTION, SOLUTION INTRAVENOUS; SUBCUTANEOUS at 18:06

## 2019-01-01 RX ADMIN — IPRATROPIUM BROMIDE AND ALBUTEROL SULFATE 3 ML: 2.5; .5 SOLUTION RESPIRATORY (INHALATION) at 13:02

## 2019-01-01 RX ADMIN — WARFARIN SODIUM 5 MG: 5 TABLET ORAL at 17:07

## 2019-01-01 RX ADMIN — INSULIN LISPRO 2 UNITS: 100 INJECTION, SOLUTION INTRAVENOUS; SUBCUTANEOUS at 20:58

## 2019-01-01 RX ADMIN — LORAZEPAM 1 MG: 2 INJECTION INTRAMUSCULAR; INTRAVENOUS at 20:56

## 2019-01-01 RX ADMIN — SODIUM CHLORIDE 5 MG/HR: 900 INJECTION, SOLUTION INTRAVENOUS at 14:48

## 2019-01-01 RX ADMIN — METOPROLOL SUCCINATE 25 MG: 25 TABLET, FILM COATED, EXTENDED RELEASE ORAL at 08:20

## 2019-01-01 RX ADMIN — WARFARIN SODIUM 3 MG: 3 TABLET ORAL at 17:23

## 2019-01-01 RX ADMIN — ALPRAZOLAM 0.5 MG: 0.5 TABLET ORAL at 23:11

## 2019-01-01 RX ADMIN — WARFARIN SODIUM 3 MG: 3 TABLET ORAL at 16:31

## 2019-01-01 RX ADMIN — METHIMAZOLE 5 MG: 5 TABLET ORAL at 08:29

## 2019-01-01 RX ADMIN — ESCITALOPRAM 10 MG: 10 TABLET, FILM COATED ORAL at 08:08

## 2019-01-01 RX ADMIN — ALPRAZOLAM 0.5 MG: 0.5 TABLET ORAL at 20:22

## 2019-01-01 RX ADMIN — AZITHROMYCIN 500 MG: 500 INJECTION, POWDER, LYOPHILIZED, FOR SOLUTION INTRAVENOUS at 06:31

## 2019-01-01 RX ADMIN — METHIMAZOLE 5 MG: 5 TABLET ORAL at 08:08

## 2019-01-01 RX ADMIN — ROPINIROLE 1 MG: 1 TABLET, FILM COATED ORAL at 20:30

## 2019-01-01 RX ADMIN — METHYLPREDNISOLONE SODIUM SUCCINATE 40 MG: 40 INJECTION, POWDER, LYOPHILIZED, FOR SOLUTION INTRAMUSCULAR; INTRAVENOUS at 08:52

## 2019-01-01 RX ADMIN — INSULIN LISPRO 2 UNITS: 100 INJECTION, SOLUTION INTRAVENOUS; SUBCUTANEOUS at 20:21

## 2019-01-01 RX ADMIN — IPRATROPIUM BROMIDE AND ALBUTEROL SULFATE 3 ML: 2.5; .5 SOLUTION RESPIRATORY (INHALATION) at 23:19

## 2019-01-01 RX ADMIN — INSULIN LISPRO 2 UNITS: 100 INJECTION, SOLUTION INTRAVENOUS; SUBCUTANEOUS at 11:37

## 2019-01-01 RX ADMIN — METOPROLOL SUCCINATE 25 MG: 25 TABLET, FILM COATED, EXTENDED RELEASE ORAL at 20:30

## 2019-01-01 RX ADMIN — METOPROLOL SUCCINATE 25 MG: 25 TABLET, FILM COATED, EXTENDED RELEASE ORAL at 09:03

## 2019-01-01 RX ADMIN — GUAIFENESIN AND CODEINE PHOSPHATE 5 ML: 100; 10 SOLUTION ORAL at 12:03

## 2019-01-01 RX ADMIN — DIGOXIN 250 MCG: 250 TABLET ORAL at 12:22

## 2019-01-01 RX ADMIN — ROPINIROLE 1 MG: 1 TABLET, FILM COATED ORAL at 20:21

## 2019-01-01 RX ADMIN — IPRATROPIUM BROMIDE AND ALBUTEROL SULFATE 3 ML: 2.5; .5 SOLUTION RESPIRATORY (INHALATION) at 07:26

## 2019-01-01 RX ADMIN — DIGOXIN 250 MCG: 250 TABLET ORAL at 12:20

## 2019-01-01 RX ADMIN — ALPRAZOLAM 0.5 MG: 0.5 TABLET ORAL at 04:50

## 2019-01-01 RX ADMIN — IPRATROPIUM BROMIDE AND ALBUTEROL SULFATE 3 ML: 2.5; .5 SOLUTION RESPIRATORY (INHALATION) at 12:06

## 2019-01-01 RX ADMIN — IPRATROPIUM BROMIDE AND ALBUTEROL SULFATE 3 ML: 2.5; .5 SOLUTION RESPIRATORY (INHALATION) at 16:30

## 2019-01-01 RX ADMIN — METHYLPREDNISOLONE SODIUM SUCCINATE 40 MG: 40 INJECTION, POWDER, LYOPHILIZED, FOR SOLUTION INTRAMUSCULAR; INTRAVENOUS at 08:20

## 2019-01-01 RX ADMIN — ASPIRIN 81 MG: 81 TABLET, COATED ORAL at 08:49

## 2019-01-01 RX ADMIN — ESCITALOPRAM OXALATE 20 MG: 20 TABLET, FILM COATED ORAL at 08:49

## 2019-01-01 RX ADMIN — GUAIFENESIN AND CODEINE PHOSPHATE 5 ML: 100; 10 SOLUTION ORAL at 00:25

## 2019-01-01 RX ADMIN — DILTIAZEM HYDROCHLORIDE 30 MG: 30 TABLET, FILM COATED ORAL at 12:23

## 2019-01-01 RX ADMIN — IPRATROPIUM BROMIDE AND ALBUTEROL SULFATE 3 ML: 2.5; .5 SOLUTION RESPIRATORY (INHALATION) at 20:27

## 2019-01-01 RX ADMIN — IPRATROPIUM BROMIDE AND ALBUTEROL SULFATE 3 ML: 2.5; .5 SOLUTION RESPIRATORY (INHALATION) at 21:54

## 2019-01-01 RX ADMIN — IPRATROPIUM BROMIDE AND ALBUTEROL SULFATE 3 ML: 2.5; .5 SOLUTION RESPIRATORY (INHALATION) at 10:51

## 2019-01-01 RX ADMIN — IPRATROPIUM BROMIDE AND ALBUTEROL SULFATE 3 ML: 2.5; .5 SOLUTION RESPIRATORY (INHALATION) at 03:33

## 2019-01-01 RX ADMIN — IPRATROPIUM BROMIDE AND ALBUTEROL SULFATE 3 ML: 2.5; .5 SOLUTION RESPIRATORY (INHALATION) at 20:11

## 2019-01-01 RX ADMIN — METOPROLOL SUCCINATE 25 MG: 25 TABLET, FILM COATED, EXTENDED RELEASE ORAL at 20:57

## 2019-01-01 RX ADMIN — DILTIAZEM HYDROCHLORIDE 300 MG: 300 CAPSULE, COATED, EXTENDED RELEASE ORAL at 16:52

## 2019-01-01 RX ADMIN — METHYLPREDNISOLONE SODIUM SUCCINATE 40 MG: 40 INJECTION, POWDER, LYOPHILIZED, FOR SOLUTION INTRAMUSCULAR; INTRAVENOUS at 06:31

## 2019-01-01 RX ADMIN — DILTIAZEM HYDROCHLORIDE 300 MG: 300 CAPSULE, COATED, EXTENDED RELEASE ORAL at 08:49

## 2019-01-01 RX ADMIN — IPRATROPIUM BROMIDE AND ALBUTEROL SULFATE 3 ML: 2.5; .5 SOLUTION RESPIRATORY (INHALATION) at 01:26

## 2019-01-01 RX ADMIN — METHIMAZOLE 5 MG: 5 TABLET ORAL at 20:30

## 2019-01-01 RX ADMIN — DIGOXIN 250 MCG: 250 TABLET ORAL at 12:57

## 2019-01-01 RX ADMIN — IPRATROPIUM BROMIDE AND ALBUTEROL SULFATE 3 ML: 2.5; .5 SOLUTION RESPIRATORY (INHALATION) at 08:35

## 2019-01-01 RX ADMIN — INSULIN LISPRO 4 UNITS: 100 INJECTION, SOLUTION INTRAVENOUS; SUBCUTANEOUS at 20:09

## 2019-01-01 RX ADMIN — IPRATROPIUM BROMIDE AND ALBUTEROL SULFATE 3 ML: 2.5; .5 SOLUTION RESPIRATORY (INHALATION) at 23:43

## 2019-01-01 RX ADMIN — SODIUM CHLORIDE 125 ML/HR: 9 INJECTION, SOLUTION INTRAVENOUS at 09:31

## 2019-01-01 RX ADMIN — ALPRAZOLAM 0.5 MG: 0.5 TABLET ORAL at 16:33

## 2019-01-01 RX ADMIN — METHYLPREDNISOLONE SODIUM SUCCINATE 125 MG: 125 INJECTION, POWDER, FOR SOLUTION INTRAMUSCULAR; INTRAVENOUS at 16:53

## 2019-01-01 RX ADMIN — ESCITALOPRAM 10 MG: 10 TABLET, FILM COATED ORAL at 16:52

## 2019-01-01 RX ADMIN — INSULIN LISPRO 2 UNITS: 100 INJECTION, SOLUTION INTRAVENOUS; SUBCUTANEOUS at 20:22

## 2019-01-01 RX ADMIN — ASPIRIN 81 MG: 81 TABLET, COATED ORAL at 08:03

## 2019-01-01 RX ADMIN — ALBUTEROL SULFATE 2.5 MG: 2.5 SOLUTION RESPIRATORY (INHALATION) at 03:14

## 2019-01-01 RX ADMIN — ESCITALOPRAM OXALATE 20 MG: 20 TABLET, FILM COATED ORAL at 08:29

## 2019-01-01 RX ADMIN — LORAZEPAM 1 MG: 2 INJECTION INTRAMUSCULAR; INTRAVENOUS at 06:00

## 2019-01-01 RX ADMIN — IPRATROPIUM BROMIDE AND ALBUTEROL SULFATE 3 ML: 2.5; .5 SOLUTION RESPIRATORY (INHALATION) at 07:42

## 2019-01-01 RX ADMIN — METHYLPREDNISOLONE SODIUM SUCCINATE 40 MG: 40 INJECTION, POWDER, LYOPHILIZED, FOR SOLUTION INTRAMUSCULAR; INTRAVENOUS at 16:27

## 2019-01-01 RX ADMIN — METHIMAZOLE 5 MG: 5 TABLET ORAL at 09:46

## 2019-01-01 RX ADMIN — WARFARIN SODIUM 3 MG: 3 TABLET ORAL at 17:05

## 2019-01-01 RX ADMIN — METHYLPREDNISOLONE SODIUM SUCCINATE 40 MG: 40 INJECTION, POWDER, FOR SOLUTION INTRAMUSCULAR; INTRAVENOUS at 08:30

## 2019-01-01 RX ADMIN — SODIUM CHLORIDE, PRESERVATIVE FREE 3 ML: 5 INJECTION INTRAVENOUS at 21:30

## 2019-01-01 RX ADMIN — SODIUM CHLORIDE 500 ML: 9 INJECTION, SOLUTION INTRAVENOUS at 10:21

## 2019-01-01 RX ADMIN — INSULIN LISPRO 3 UNITS: 100 INJECTION, SOLUTION INTRAVENOUS; SUBCUTANEOUS at 21:53

## 2019-01-01 RX ADMIN — ALBUTEROL SULFATE 2.5 MG: 2.5 SOLUTION RESPIRATORY (INHALATION) at 09:49

## 2019-01-01 RX ADMIN — SODIUM CHLORIDE, PRESERVATIVE FREE 3 ML: 5 INJECTION INTRAVENOUS at 08:29

## 2019-01-01 RX ADMIN — ALBUTEROL SULFATE 2.5 MG: 2.5 SOLUTION RESPIRATORY (INHALATION) at 09:46

## 2019-01-01 RX ADMIN — DEXMEDETOMIDINE HYDROCHLORIDE 0.4 MCG/KG/HR: 100 INJECTION, SOLUTION, CONCENTRATE INTRAVENOUS at 19:42

## 2019-01-01 RX ADMIN — METHYLPREDNISOLONE SODIUM SUCCINATE 40 MG: 40 INJECTION, POWDER, LYOPHILIZED, FOR SOLUTION INTRAMUSCULAR; INTRAVENOUS at 16:25

## 2019-01-01 RX ADMIN — ACETAMINOPHEN 650 MG: 325 TABLET, FILM COATED ORAL at 14:57

## 2019-01-01 RX ADMIN — INSULIN LISPRO 2 UNITS: 100 INJECTION, SOLUTION INTRAVENOUS; SUBCUTANEOUS at 08:28

## 2019-01-01 RX ADMIN — ACETAMINOPHEN 650 MG: 325 TABLET, FILM COATED ORAL at 08:29

## 2019-01-01 RX ADMIN — ASPIRIN 81 MG: 81 TABLET, COATED ORAL at 08:29

## 2019-01-01 RX ADMIN — ONDANSETRON 4 MG: 2 INJECTION INTRAMUSCULAR; INTRAVENOUS at 12:09

## 2019-01-01 RX ADMIN — METHYLPREDNISOLONE SODIUM SUCCINATE 40 MG: 40 INJECTION, POWDER, LYOPHILIZED, FOR SOLUTION INTRAMUSCULAR; INTRAVENOUS at 14:58

## 2019-01-01 RX ADMIN — IPRATROPIUM BROMIDE AND ALBUTEROL SULFATE 3 ML: 2.5; .5 SOLUTION RESPIRATORY (INHALATION) at 04:08

## 2019-01-01 RX ADMIN — GUAIFENESIN 1200 MG: 600 TABLET, EXTENDED RELEASE ORAL at 12:22

## 2019-01-01 RX ADMIN — SODIUM CHLORIDE, PRESERVATIVE FREE 3 ML: 5 INJECTION INTRAVENOUS at 20:23

## 2019-01-01 RX ADMIN — GUAIFENESIN AND CODEINE PHOSPHATE 5 ML: 100; 10 SOLUTION ORAL at 11:27

## 2019-01-01 RX ADMIN — IPRATROPIUM BROMIDE AND ALBUTEROL SULFATE 3 ML: 2.5; .5 SOLUTION RESPIRATORY (INHALATION) at 23:46

## 2019-01-01 RX ADMIN — ALPRAZOLAM 0.5 MG: 0.5 TABLET ORAL at 08:20

## 2019-01-01 RX ADMIN — INSULIN LISPRO 3 UNITS: 100 INJECTION, SOLUTION INTRAVENOUS; SUBCUTANEOUS at 11:53

## 2019-01-01 RX ADMIN — LORAZEPAM 1 MG: 2 INJECTION INTRAMUSCULAR; INTRAVENOUS at 13:20

## 2019-01-01 RX ADMIN — IPRATROPIUM BROMIDE AND ALBUTEROL SULFATE 3 ML: 2.5; .5 SOLUTION RESPIRATORY (INHALATION) at 07:33

## 2019-01-01 RX ADMIN — LORAZEPAM 1 MG: 2 INJECTION INTRAMUSCULAR; INTRAVENOUS at 02:36

## 2019-01-01 RX ADMIN — IPRATROPIUM BROMIDE AND ALBUTEROL SULFATE 3 ML: 2.5; .5 SOLUTION RESPIRATORY (INHALATION) at 17:46

## 2019-01-01 RX ADMIN — IPRATROPIUM BROMIDE AND ALBUTEROL SULFATE 3 ML: 2.5; .5 SOLUTION RESPIRATORY (INHALATION) at 12:27

## 2019-01-01 RX ADMIN — ASPIRIN 81 MG: 81 TABLET, COATED ORAL at 08:07

## 2019-01-01 RX ADMIN — DIGOXIN 250 MCG: 250 TABLET ORAL at 12:23

## 2019-01-01 RX ADMIN — IPRATROPIUM BROMIDE AND ALBUTEROL SULFATE 3 ML: 2.5; .5 SOLUTION RESPIRATORY (INHALATION) at 21:45

## 2019-01-01 RX ADMIN — ASPIRIN 81 MG: 81 TABLET, COATED ORAL at 09:03

## 2019-01-01 RX ADMIN — ALPRAZOLAM 0.5 MG: 0.5 TABLET ORAL at 08:29

## 2019-01-01 RX ADMIN — ROPINIROLE 1 MG: 1 TABLET, FILM COATED ORAL at 20:09

## 2019-01-01 RX ADMIN — INSULIN LISPRO 4 UNITS: 100 INJECTION, SOLUTION INTRAVENOUS; SUBCUTANEOUS at 11:51

## 2019-01-01 RX ADMIN — GUAIFENESIN 1200 MG: 600 TABLET, EXTENDED RELEASE ORAL at 08:20

## 2019-01-01 RX ADMIN — ESCITALOPRAM 10 MG: 10 TABLET, FILM COATED ORAL at 08:19

## 2019-01-01 RX ADMIN — IPRATROPIUM BROMIDE AND ALBUTEROL SULFATE 3 ML: 2.5; .5 SOLUTION RESPIRATORY (INHALATION) at 03:37

## 2019-01-01 RX ADMIN — METHYLPREDNISOLONE SODIUM SUCCINATE 40 MG: 40 INJECTION, POWDER, LYOPHILIZED, FOR SOLUTION INTRAMUSCULAR; INTRAVENOUS at 08:05

## 2019-01-01 RX ADMIN — METHYLPREDNISOLONE SODIUM SUCCINATE 40 MG: 40 INJECTION, POWDER, FOR SOLUTION INTRAMUSCULAR; INTRAVENOUS at 08:49

## 2019-01-01 RX ADMIN — IPRATROPIUM BROMIDE AND ALBUTEROL SULFATE 3 ML: 2.5; .5 SOLUTION RESPIRATORY (INHALATION) at 14:57

## 2019-01-01 RX ADMIN — WARFARIN SODIUM 1 MG: 1 TABLET ORAL at 17:16

## 2019-01-01 RX ADMIN — METHIMAZOLE 5 MG: 5 TABLET ORAL at 08:20

## 2019-01-01 RX ADMIN — ESCITALOPRAM 10 MG: 10 TABLET, FILM COATED ORAL at 08:49

## 2019-01-01 RX ADMIN — METHIMAZOLE 5 MG: 5 TABLET ORAL at 08:50

## 2019-01-01 RX ADMIN — INSULIN LISPRO 4 UNITS: 100 INJECTION, SOLUTION INTRAVENOUS; SUBCUTANEOUS at 17:04

## 2019-01-01 RX ADMIN — IPRATROPIUM BROMIDE AND ALBUTEROL SULFATE 3 ML: 2.5; .5 SOLUTION RESPIRATORY (INHALATION) at 00:14

## 2019-01-01 RX ADMIN — ACETAMINOPHEN 650 MG: 325 TABLET, FILM COATED ORAL at 15:23

## 2019-01-01 RX ADMIN — METHYLPREDNISOLONE SODIUM SUCCINATE 40 MG: 40 INJECTION, POWDER, LYOPHILIZED, FOR SOLUTION INTRAMUSCULAR; INTRAVENOUS at 08:49

## 2019-01-01 RX ADMIN — METHYLPREDNISOLONE SODIUM SUCCINATE 40 MG: 40 INJECTION, POWDER, LYOPHILIZED, FOR SOLUTION INTRAMUSCULAR; INTRAVENOUS at 15:10

## 2019-01-01 RX ADMIN — METHYLPREDNISOLONE SODIUM SUCCINATE 40 MG: 40 INJECTION, POWDER, LYOPHILIZED, FOR SOLUTION INTRAMUSCULAR; INTRAVENOUS at 23:55

## 2019-01-01 RX ADMIN — IPRATROPIUM BROMIDE AND ALBUTEROL SULFATE 3 ML: 2.5; .5 SOLUTION RESPIRATORY (INHALATION) at 22:41

## 2019-01-01 RX ADMIN — GUAIFENESIN AND CODEINE PHOSPHATE 5 ML: 100; 10 SOLUTION ORAL at 21:27

## 2019-01-01 RX ADMIN — IPRATROPIUM BROMIDE AND ALBUTEROL SULFATE 3 ML: 2.5; .5 SOLUTION RESPIRATORY (INHALATION) at 07:11

## 2019-01-01 RX ADMIN — DEXMEDETOMIDINE HYDROCHLORIDE 0.4 MCG/KG/HR: 100 INJECTION, SOLUTION, CONCENTRATE INTRAVENOUS at 04:10

## 2019-01-01 RX ADMIN — ESCITALOPRAM 10 MG: 10 TABLET, FILM COATED ORAL at 08:28

## 2019-01-01 RX ADMIN — DILTIAZEM HYDROCHLORIDE 30 MG: 30 TABLET, FILM COATED ORAL at 06:39

## 2019-01-01 RX ADMIN — DILTIAZEM HYDROCHLORIDE 300 MG: 300 CAPSULE, COATED, EXTENDED RELEASE ORAL at 08:28

## 2019-01-01 RX ADMIN — METHIMAZOLE 5 MG: 5 TABLET ORAL at 09:03

## 2019-01-01 RX ADMIN — IPRATROPIUM BROMIDE AND ALBUTEROL SULFATE 3 ML: 2.5; .5 SOLUTION RESPIRATORY (INHALATION) at 23:35

## 2019-01-01 RX ADMIN — ALPRAZOLAM 0.5 MG: 0.5 TABLET ORAL at 22:27

## 2019-01-01 RX ADMIN — IPRATROPIUM BROMIDE AND ALBUTEROL SULFATE 3 ML: 2.5; .5 SOLUTION RESPIRATORY (INHALATION) at 16:37

## 2019-01-01 RX ADMIN — DILTIAZEM HYDROCHLORIDE 300 MG: 300 CAPSULE, COATED, EXTENDED RELEASE ORAL at 17:22

## 2019-01-01 RX ADMIN — DIPHENHYDRAMINE HYDROCHLORIDE 25 MG: 25 CAPSULE ORAL at 22:38

## 2019-01-01 RX ADMIN — ASPIRIN 81 MG: 81 TABLET, COATED ORAL at 08:08

## 2019-01-01 RX ADMIN — GUAIFENESIN AND CODEINE PHOSPHATE 5 ML: 100; 10 SOLUTION ORAL at 17:27

## 2019-01-01 RX ADMIN — METHYLPREDNISOLONE SODIUM SUCCINATE 40 MG: 40 INJECTION, POWDER, LYOPHILIZED, FOR SOLUTION INTRAMUSCULAR; INTRAVENOUS at 16:30

## 2019-01-01 RX ADMIN — METHYLPREDNISOLONE SODIUM SUCCINATE 40 MG: 40 INJECTION, POWDER, LYOPHILIZED, FOR SOLUTION INTRAMUSCULAR; INTRAVENOUS at 08:08

## 2019-01-01 RX ADMIN — METHIMAZOLE 5 MG: 5 TABLET ORAL at 08:28

## 2019-01-01 RX ADMIN — DIPHENHYDRAMINE HYDROCHLORIDE 25 MG: 25 CAPSULE ORAL at 22:07

## 2019-01-01 RX ADMIN — METHYLPREDNISOLONE SODIUM SUCCINATE 125 MG: 125 INJECTION, POWDER, FOR SOLUTION INTRAMUSCULAR; INTRAVENOUS at 05:38

## 2019-01-01 RX ADMIN — AZITHROMYCIN 500 MG: 500 INJECTION, POWDER, LYOPHILIZED, FOR SOLUTION INTRAVENOUS at 08:03

## 2019-01-01 RX ADMIN — METHIMAZOLE 5 MG: 5 TABLET ORAL at 20:26

## 2019-01-01 RX ADMIN — IPRATROPIUM BROMIDE AND ALBUTEROL SULFATE 3 ML: 2.5; .5 SOLUTION RESPIRATORY (INHALATION) at 15:49

## 2019-01-01 RX ADMIN — IPRATROPIUM BROMIDE AND ALBUTEROL SULFATE 3 ML: 2.5; .5 SOLUTION RESPIRATORY (INHALATION) at 20:24

## 2019-01-01 RX ADMIN — IPRATROPIUM BROMIDE AND ALBUTEROL SULFATE 3 ML: 2.5; .5 SOLUTION RESPIRATORY (INHALATION) at 07:24

## 2019-01-01 RX ADMIN — GUAIFENESIN 1200 MG: 600 TABLET, EXTENDED RELEASE ORAL at 20:30

## 2019-01-01 RX ADMIN — ACETAMINOPHEN 650 MG: 325 TABLET, FILM COATED ORAL at 18:40

## 2019-01-01 RX ADMIN — GUAIFENESIN 1200 MG: 600 TABLET, EXTENDED RELEASE ORAL at 20:25

## 2019-01-01 RX ADMIN — ALBUTEROL SULFATE 2.5 MG: 2.5 SOLUTION RESPIRATORY (INHALATION) at 09:47

## 2019-01-01 RX ADMIN — SODIUM CHLORIDE, PRESERVATIVE FREE 3 ML: 5 INJECTION INTRAVENOUS at 08:20

## 2019-01-01 RX ADMIN — METHIMAZOLE 5 MG: 5 TABLET ORAL at 08:40

## 2019-01-01 RX ADMIN — GUAIFENESIN 1200 MG: 600 TABLET, EXTENDED RELEASE ORAL at 20:57

## 2019-01-01 RX ADMIN — METHYLPREDNISOLONE SODIUM SUCCINATE 40 MG: 40 INJECTION, POWDER, LYOPHILIZED, FOR SOLUTION INTRAMUSCULAR; INTRAVENOUS at 23:43

## 2019-01-01 RX ADMIN — ACETAMINOPHEN 650 MG: 325 TABLET, FILM COATED ORAL at 05:28

## 2019-01-01 RX ADMIN — INSULIN LISPRO 2 UNITS: 100 INJECTION, SOLUTION INTRAVENOUS; SUBCUTANEOUS at 17:57

## 2019-01-01 RX ADMIN — METHYLPREDNISOLONE SODIUM SUCCINATE 125 MG: 125 INJECTION, POWDER, FOR SOLUTION INTRAMUSCULAR; INTRAVENOUS at 14:24

## 2019-01-01 RX ADMIN — ESCITALOPRAM 10 MG: 10 TABLET, FILM COATED ORAL at 08:04

## 2019-01-01 RX ADMIN — IPRATROPIUM BROMIDE AND ALBUTEROL SULFATE 3 ML: 2.5; .5 SOLUTION RESPIRATORY (INHALATION) at 20:13

## 2019-01-01 RX ADMIN — ROPINIROLE 1 MG: 1 TABLET, FILM COATED ORAL at 23:43

## 2019-01-01 RX ADMIN — IPRATROPIUM BROMIDE AND ALBUTEROL SULFATE 3 ML: 2.5; .5 SOLUTION RESPIRATORY (INHALATION) at 23:58

## 2019-01-01 RX ADMIN — GUAIFENESIN AND CODEINE PHOSPHATE 5 ML: 100; 10 SOLUTION ORAL at 11:25

## 2019-01-01 RX ADMIN — DILTIAZEM HYDROCHLORIDE 30 MG: 30 TABLET, FILM COATED ORAL at 12:20

## 2019-01-01 RX ADMIN — AZITHROMYCIN 500 MG: 500 INJECTION, POWDER, LYOPHILIZED, FOR SOLUTION INTRAVENOUS at 08:50

## 2019-02-20 PROBLEM — I48.91 RAPID ATRIAL FIBRILLATION (HCC): Status: ACTIVE | Noted: 2019-01-01

## 2019-02-23 PROBLEM — I48.91 RAPID ATRIAL FIBRILLATION (HCC): Status: RESOLVED | Noted: 2019-01-01 | Resolved: 2019-01-01

## 2019-12-08 PROBLEM — J96.01 ACUTE RESPIRATORY FAILURE WITH HYPOXIA AND HYPERCAPNIA (HCC): Status: ACTIVE | Noted: 2019-01-01

## 2019-12-08 PROBLEM — J96.02 ACUTE RESPIRATORY FAILURE WITH HYPOXIA AND HYPERCAPNIA (HCC): Status: ACTIVE | Noted: 2019-01-01

## 2019-12-08 NOTE — H&P
Group: [facility]         H/p  NOTE    Patient Identification:  Leonor Bhandari  64 y.o.  female  1955  [id number]                 CC: Shortness of breath worse for 4 days    History of Present Illness:  Very pleasant 64-year-old female with known history of COPD oxygen dependent A. fib chronic fatigue follows Dr. Cunningham.  Patient states that she has been sick at least for a week and progressively getting worse in the last 4 days.  She has had some cough and productive sputum.  No aspiration reported.  No fever chills at home.  She uses 2 L oxygen nasal cannula.  Patient was noted to be in some respirators in the emergency room placed on BiPAP after which she improved.  She is currently denying any chest pain.  She has sleep apnea and compliant with the CPAP.      Review of Systems  Constitutional: Negative for fever.   HENT: Negative for sore throat.    Eyes: Negative.    Respiratory: Positive for cough ( productive) and shortness of breath.    Cardiovascular: Negative for chest pain.   Gastrointestinal: Positive for nausea. Negative for abdominal pain, diarrhea and vomiting.   Genitourinary: Negative for dysuria.   Musculoskeletal: Negative for neck pain.   Skin: Negative for rash.   Allergic/Immunologic: Negative.    Neurological: Negative for weakness, numbness and headaches.   Hematological: Negative.    Psychiatric/Behavioral: Negative.    All other systems reviewed and are negative.     Past Medical History:  Past Medical History:   Diagnosis Date   • Anxiety and depression    • Atrial fibrillation (CMS/HCC)    • Chronic fatigue    • COPD (chronic obstructive pulmonary disease) (CMS/HCC)    • Graves disease    • HTN (hypertension)    • Hyperlipidemia    • Hyperthyroidism    • Post-menopausal osteoporosis        Past Surgical History:  Past Surgical History:   Procedure Laterality Date   • APPENDECTOMY     •  SECTION     • TONSILLECTOMY AND ADENOIDECTOMY          Home  "Meds:  Medications Prior to Admission   Medication Sig Dispense Refill Last Dose   • aspirin (aspirin EC) 81 MG EC tablet Take 81 mg by mouth.   2/19/2019 at Unknown time   • digoxin (LANOXIN) 250 MCG tablet Take 250 mcg by mouth Daily.      • diphenhydrAMINE (BENADRYL) 25 mg capsule Take 25 mg by mouth At Night As Needed for Sleep.      • escitalopram (LEXAPRO) 10 MG tablet Take 10 mg by mouth Daily.   2/19/2019 at Unknown time   • guaiFENesin (MUCINEX) 600 MG 12 hr tablet Take 600 mg by mouth As Needed for Cough or Congestion.   2/19/2019 at Unknown time   • ipratropium-albuterol (DUO-NEB) 0.5-2.5 mg/mL nebulizer Inhale 3 mL Every 4 (Four) Hours.   2/20/2019 at Unknown time   • methIMAzole (TAPAZOLE) 10 MG tablet Take 0.5 tablets by mouth Daily With Breakfast. 30 tablet 3 2/19/2019 at Unknown time   • metoprolol succinate XL (TOPROL-XL) 25 MG 24 hr tablet Take 25 mg by mouth Daily.      • predniSONE (DELTASONE) 5 MG tablet Take 5 mg by mouth 2 (Two) Times a Day.   Taking   • rOPINIRole (REQUIP) 1 MG tablet Take 1 mg by mouth Every Night. Take 1 hour before bedtime.      • VENTOLIN  (90 BASE) MCG/ACT inhaler Inhale 2 puffs Every 4 (Four) Hours As Needed for Wheezing or Shortness of Air.   2/19/2019 at Unknown time   • warfarin (COUMADIN) 5 MG tablet Take 2.5 mg by mouth Daily.   Patient Taking Differently at Unknown time       Allergies:  Allergies   Allergen Reactions   • Ramipril Hives       Social History:   Social History     Socioeconomic History   • Marital status: Unknown     Spouse name: Not on file   • Number of children: Not on file   • Years of education: Not on file   • Highest education level: Not on file   Tobacco Use   • Smoking status: Never Smoker   Substance and Sexual Activity   • Alcohol use: No       Family History:  History reviewed. No pertinent family history.    Physical Exam:  /89   Pulse 120   Temp 98.7 °F (37.1 °C) (Tympanic)   Resp 22   Ht 162.6 cm (64\")   Wt 79.9 kg " (176 lb 3.2 oz)   SpO2 98%   BMI 30.24 kg/m²  Body mass index is 30.24 kg/m². 98% 79.9 kg (176 lb 3.2 oz)  Physical Exam  Middle-aged female in some mild respiratory distress currently on BiPAP  Well-developed obese body habitus  Eyes normal conjunctive a pupils reactive to light  ENT Mallampati 4 normal nasal exam  Neck midline trachea no thyromegaly  Chest diminished breath sound wheezing bilaterally mild tachypnea and labored breathing  CVS regular rate and rhythm no lower extremity edema  Abdomen soft nontender no hepatosplenomegaly  CNS intact normal sensory exam  Skin no rashes or nodules  Psych oriented to time place and person normal memory  Musculoskeletal no cyanosis no clubbing normal range of motion      LABS:  Lab Results   Component Value Date    CALCIUM 9.7 12/08/2019     Results from last 7 days   Lab Units 12/08/19  0342   SODIUM mmol/L 142   POTASSIUM mmol/L 4.8   CHLORIDE mmol/L 97*   CO2 mmol/L 27.3   BUN mg/dL 9   CREATININE mg/dL 0.51*   GLUCOSE mg/dL 117*   CALCIUM mg/dL 9.7   WBC 10*3/mm3 14.15*   HEMOGLOBIN g/dL 14.6   PLATELETS 10*3/mm3 367   ALT (SGPT) U/L 24   AST (SGOT) U/L 28   PROBNP pg/mL 242.3     Lab Results   Component Value Date    CKTOTAL 28 (L) 01/26/2019    TROPONINI <0.012 03/19/2019    TROPONINT <0.010 12/08/2019     Results from last 7 days   Lab Units 12/08/19  0342   TROPONIN T ng/mL <0.010             Results from last 7 days   Lab Units 12/08/19  0410   PH, ARTERIAL pH units 7.328*   PCO2, ARTERIAL mm Hg 57.2*   PO2 ART mm Hg 86.3   MODALITY  BiPap   O2 SATURATION CALC % 95.5         Results from last 7 days   Lab Units 12/08/19  0342   INR  2.10*         Lab Results   Component Value Date    TSH <0.015 (L) 03/18/2019     Estimated Creatinine Clearance: 117 mL/min (A) (by C-G formula based on SCr of 0.51 mg/dL (L)).         Imaging: I personally visualized the images of scans/x-rays performed within last 3 days.      Assessment:  Acute on chronic hypoxemic hypercapnic  respiratory failure  Acute exacerbation of COPD  A. fib  DARLENE on CPAP  Chronic systolic CHF  History of hyperthyroidism  Chronic anticoagulation  Tobacco abuse    Recommendations:  At this point we have female with acute on chronic hypoxemic hypercapnic respiratory failure likely due to COPD exacerbation  Initiate IV steroid and bronchodilators  Zithromax mostly for anti-inflammatory effect  Once she is transitioned of the BiPAP she could be transitioned on her home CPAP  BiPAP for work of breathing.  Wean off as tolerated  Resume anticoagulation  Volume status appears to be euvolemic  Advised patient to quit smoking long-term  We will continue to monitor closely  ICU routine measures  Patient critically ill      Critical care time 35 minutes            Rufina Montoya MD  12/8/2019  6:25 AM      Much of this encounter note is an electronic transcription/translation of spoken language to printed text using Dragon Software.

## 2019-12-08 NOTE — ED PROVIDER NOTES
EMERGENCY DEPARTMENT ENCOUNTER    CHIEF COMPLAINT  Chief Complaint: SOA  History given by: patient  History limited by: none  Room Number: I372/1  PMD: Reyes, Miriam Mercado, MD      HPI:  Pt is a 64 y.o. female who presents complaining of SOA that began 4 days ago and worsened tonight. Pt affirms hx of COPD as well as productive cough and nausea. She denies CP and abdominal pain. Pt states she is typically on 2L home O2.    Duration:  4 days, worsening today  Onset: gradual  Timing: constant  Location: pulm  Radiation: none  Quality: SOA  Intensity/Severity: moderate  Progression: worsening  Associated Symptoms: nausea, productive cough  Aggravating Factors: none  Alleviating Factors: none  Previous Episodes: none  Treatment before arrival: none    PAST MEDICAL HISTORY  Active Ambulatory Problems     Diagnosis Date Noted   • Hyperthyroidism 2016   • Current use of steroid medication 2016   • Chronic fatigue 2016   • Post-menopausal osteoporosis 2016     Resolved Ambulatory Problems     Diagnosis Date Noted   • Rapid atrial fibrillation (CMS/HCC) 2019     Past Medical History:   Diagnosis Date   • Anxiety and depression    • Asthma    • Atrial fibrillation (CMS/HCC)    • COPD (chronic obstructive pulmonary disease) (CMS/HCC)    • Graves disease    • HTN (hypertension)    • Hyperlipidemia    • Sleep apnea        PAST SURGICAL HISTORY  Past Surgical History:   Procedure Laterality Date   • APPENDECTOMY     •  SECTION     • TONSILLECTOMY AND ADENOIDECTOMY         FAMILY HISTORY  History reviewed. No pertinent family history.    SOCIAL HISTORY  Social History     Socioeconomic History   • Marital status: Unknown     Spouse name: Not on file   • Number of children: Not on file   • Years of education: Not on file   • Highest education level: Not on file   Tobacco Use   • Smoking status: Current Some Day Smoker     Packs/day: 0.25     Years: 15.00     Pack years: 3.75     Types:  Cigarettes     Start date: 12/8/1990   • Smokeless tobacco: Never Used   Substance and Sexual Activity   • Alcohol use: No   • Drug use: Never   • Sexual activity: Defer       ALLERGIES  Ramipril    REVIEW OF SYSTEMS  Review of Systems   Constitutional: Negative for fever.   HENT: Negative for sore throat.    Eyes: Negative.    Respiratory: Positive for cough ( productive) and shortness of breath.    Cardiovascular: Negative for chest pain.   Gastrointestinal: Positive for nausea. Negative for abdominal pain, diarrhea and vomiting.   Genitourinary: Negative for dysuria.   Musculoskeletal: Negative for neck pain.   Skin: Negative for rash.   Allergic/Immunologic: Negative.    Neurological: Negative for weakness, numbness and headaches.   Hematological: Negative.    Psychiatric/Behavioral: Negative.    All other systems reviewed and are negative.      PHYSICAL EXAM  ED Triage Vitals   Temp Heart Rate Resp BP SpO2   12/08/19 0306 12/08/19 0308 12/08/19 0308 12/08/19 0308 12/08/19 0306   98.7 °F (37.1 °C) (!) 125 (!) 30 180/70 90 %      Temp src Heart Rate Source Patient Position BP Location FiO2 (%)   12/08/19 0306 -- -- -- --   Tympanic           Physical Exam   Constitutional: She is oriented to person, place, and time.   HENT:   Head: Normocephalic and atraumatic.   Eyes: Pupils are equal, round, and reactive to light. EOM are normal.   Neck: Normal range of motion. Neck supple.   Cardiovascular: Regular rhythm and normal heart sounds. Tachycardia present.   Pulmonary/Chest: She is in respiratory distress ( moderate to severe). She has wheezes ( throughout).   Abdominal: Soft. There is no tenderness. There is no rebound and no guarding.   Musculoskeletal: Normal range of motion. She exhibits no edema.   Neurological: She is alert and oriented to person, place, and time. She has normal sensation and normal strength.   Skin: Skin is warm and dry. No rash noted.   Psychiatric: Mood and affect normal.   Nursing note and  vitals reviewed.      LAB RESULTS  Lab Results (last 24 hours)     Procedure Component Value Units Date/Time    CBC & Differential [[id number]] Collected:  12/08/19 0342    Specimen:  Blood Updated:  12/08/19 0349    Narrative:       The following orders were created for panel order CBC & Differential.  Procedure                               Abnormality         Status                     ---------                               -----------         ------                     CBC Auto Differential[[id number]]        Abnormal            Final result                 Please view results for these tests on the individual orders.    Comprehensive Metabolic Panel [[id number]]  (Abnormal) Collected:  12/08/19 0342    Specimen:  Blood Updated:  12/08/19 0415     Glucose 117 mg/dL      BUN 9 mg/dL      Creatinine 0.51 mg/dL      Sodium 142 mmol/L      Potassium 4.8 mmol/L      Chloride 97 mmol/L      CO2 27.3 mmol/L      Calcium 9.7 mg/dL      Total Protein 7.8 g/dL      Albumin 3.90 g/dL      ALT (SGPT) 24 U/L      AST (SGOT) 28 U/L      Alkaline Phosphatase 106 U/L      Total Bilirubin 0.4 mg/dL      eGFR Non African Amer 122 mL/min/1.73      Globulin 3.9 gm/dL      A/G Ratio 1.0 g/dL      BUN/Creatinine Ratio 17.6     Anion Gap 17.7 mmol/L     Narrative:       GFR Normal >60  Chronic Kidney Disease <60  Kidney Failure <15      BNP [[id number]]  (Normal) Collected:  12/08/19 0342    Specimen:  Blood Updated:  12/08/19 0413     proBNP 242.3 pg/mL     Narrative:       Among patients with dyspnea, NT-proBNP is highly sensitive for the detection of acute congestive heart failure. In addition NT-proBNP of <300 pg/ml effectively rules out acute congestive heart failure with 99% negative predictive value.      Troponin [[id number]]  (Normal) Collected:  12/08/19 0342    Specimen:  Blood Updated:  12/08/19 0415     Troponin T <0.010 ng/mL     Narrative:       Troponin T Reference Range:  <= 0.03 ng/mL-   Negative for AMI  >0.03 ng/mL-      Abnormal for myocardial necrosis.  Clinicians would have to utilize clinical acumen, EKG, Troponin and serial changes to determine if it is an Acute Myocardial Infarction or myocardial injury due to an underlying chronic condition.     Protime-INR [[id number]]  (Abnormal) Collected:  12/08/19 0342    Specimen:  Blood Updated:  12/08/19 0400     Protime 23.2 Seconds      INR 2.10    CBC Auto Differential [[id number]]  (Abnormal) Collected:  12/08/19 0342    Specimen:  Blood Updated:  12/08/19 0349     WBC 14.15 10*3/mm3      RBC 5.61 10*6/mm3      Hemoglobin 14.6 g/dL      Hematocrit 44.0 %      MCV 78.4 fL      MCH 26.0 pg      MCHC 33.2 g/dL      RDW 14.4 %      RDW-SD 40.3 fl      MPV 9.8 fL      Platelets 367 10*3/mm3      Neutrophil % 69.7 %      Lymphocyte % 19.8 %      Monocyte % 8.4 %      Eosinophil % 1.1 %      Basophil % 0.3 %      Immature Grans % 0.7 %      Neutrophils, Absolute 9.87 10*3/mm3      Lymphocytes, Absolute 2.80 10*3/mm3      Monocytes, Absolute 1.19 10*3/mm3      Eosinophils, Absolute 0.15 10*3/mm3      Basophils, Absolute 0.04 10*3/mm3      Immature Grans, Absolute 0.10 10*3/mm3      nRBC 0.0 /100 WBC     Blood Gas, Arterial [[id number]]  (Abnormal) Collected:  12/08/19 0410    Specimen:  Arterial Blood Updated:  12/08/19 0418     Site Arterial: right radial     Navid's Test Positive     pH, Arterial 7.328 pH units      pCO2, Arterial 57.2 mm Hg      Comment: Critical:Notify Dr DR OCASIO (08-Dec-19 04:16:52)Read back ok        pO2, Arterial 86.3 mm Hg      HCO3, Arterial 30.0 mmol/L      Base Excess, Arterial 2.2 mmol/L      O2 Saturation Calculated 95.5 %      A-a Gradiant 0.4 mmHg      Barometric Pressure for Blood Gas 755.4 mmHg      Modality BiPap     FIO2 35 %      Set Tidal Volume 500     Set Mech Resp Rate 22     Rate 34 Breaths/minute      PEEP 6          I ordered the above labs and reviewed the results    RADIOLOGY  XR Chest 1 View   Final Result       No active disease by portable  imaging.       This report was finalized on 12/8/2019 5:53 AM by David Tinoco M.D.               I ordered the above noted radiological studies. Interpreted by radiologist. Discussed with radiologist. Reviewed by me in PACS.       PROCEDURES  Critical Care  Performed by: Austin Beltran MD  Authorized by: Austin Beltran MD     Critical care provider statement:     Critical care time (minutes):  35    Critical care time was exclusive of:  Separately billable procedures and treating other patients    Critical care was necessary to treat or prevent imminent or life-threatening deterioration of the following conditions:  Respiratory failure    Critical care was time spent personally by me on the following activities:  Blood draw for specimens, development of treatment plan with patient or surrogate, evaluation of patient's response to treatment, examination of patient, ordering and performing treatments and interventions, ordering and review of laboratory studies, ordering and review of radiographic studies, pulse oximetry, re-evaluation of patient's condition, ventilator management, obtaining history from patient or surrogate and interpretation of cardiac output measurements    I assumed direction of critical care for this patient from another provider in my specialty: no        EKG          EKG time: 0359  Rhythm/Rate: rate 125 Afib with RVR  P waves and NC: CHIRAG absent  QRS, axis: nl   ST and T waves: nl     Interpreted Contemporaneously by me, independently viewed  unchanged compared to prior 02/20/19      PROGRESS AND CONSULTS     0345: Pt is now on BiPap.    0500: Pt rechecked and resting comfortably. Discussed plan for admission. Pt understands and agrees with the plan, all questions answered.    0510: Spoke with Dr. Garcia (pulmonology). He agrees to admit the pt to ICU.    MEDICAL DECISION MAKING  Results were reviewed/discussed with the patient and they were also made aware of online access. Pt also  made aware that some labs, such as cultures, will not be resulted during ER visit and follow up with PMD is necessary.     MDM  Number of Diagnoses or Management Options  Acute respiratory failure with hypoxia and hypercapnia (CMS/HCC):   COPD with acute exacerbation (CMS/HCC):      Amount and/or Complexity of Data Reviewed  Clinical lab tests: ordered and reviewed (PCO2 57.2)  Tests in the radiology section of CPT®: ordered and reviewed (Negative acute)  Tests in the medicine section of CPT®: ordered and reviewed (See note)  Discuss the patient with other providers: yes (Dr. Garcia (pulmonology))           DIAGNOSIS  Final diagnoses:   Acute respiratory failure with hypoxia and hypercapnia (CMS/HCC)   COPD with acute exacerbation (CMS/HCC)       DISPOSITION  ADMISSION    Discussed treatment plan and reason for admission with pt/family and admitting physician.  Pt/family voiced understanding of the plan for admission for further testing/treatment as needed.         Latest Documented Vital Signs:  As of 6:35 AM  BP- 153/89 HR- 120 Temp- 98.7 °F (37.1 °C) (Tympanic) O2 sat- 98%    --  Documentation assistance provided by manny Jaquez for Dr. Beltran.  Information recorded by the manny was done at my direction and has been verified and validated by me.       Catherine Jaquez  12/08/19 0513       Catherine Jaquez  12/08/19 0521       Catherine Jaquez  12/08/19 0522       Austin Beltran MD  12/08/19 2752

## 2019-12-08 NOTE — ED NOTES
Nursing report ED to floor  Leonor Bhanadri  64 y.o.  female    HPI (triage note):   Chief Complaint   Patient presents with   • Shortness of Breath       Admitting doctor:   Rufina Montoya MD    Admitting diagnosis:   The primary encounter diagnosis was Acute respiratory failure with hypoxia and hypercapnia (CMS/HCC). A diagnosis of COPD with acute exacerbation (CMS/HCC) was also pertinent to this visit.    Code status:   Current Code Status     Date Active Code Status Order ID Comments User Context       Prior          Allergies:   Ramipril    Weight:       12/08/19 0355   Weight: 79.9 kg (176 lb 3.2 oz)       Most recent vitals:   Vitals:    12/08/19 0347 12/08/19 0355 12/08/19 0412 12/08/19 0542   BP: 131/94   153/89   Pulse: (!) 126  117 120   Resp:   (!) 32 22   Temp:       TempSrc:       SpO2: (!) 88%  96% 98%   Weight:  79.9 kg (176 lb 3.2 oz)     Height:           Active LDAs/IV Access:   Lines, Drains & Airways    Active LDAs     Name:   Placement date:   Placement time:   Site:   Days:    Peripheral IV 12/08/19 0341 Left Antecubital   12/08/19 0341    Antecubital   less than 1    Peripheral IV 12/08/19 0344 Right Hand   12/08/19 0344    Hand   less than 1                Labs (abnormal labs have a star):   Labs Reviewed   COMPREHENSIVE METABOLIC PANEL - Abnormal; Notable for the following components:       Result Value    Glucose 117 (*)     Creatinine 0.51 (*)     Chloride 97 (*)     Anion Gap 17.7 (*)     All other components within normal limits    Narrative:     GFR Normal >60  Chronic Kidney Disease <60  Kidney Failure <15     PROTIME-INR - Abnormal; Notable for the following components:    Protime 23.2 (*)     INR 2.10 (*)     All other components within normal limits   CBC WITH AUTO DIFFERENTIAL - Abnormal; Notable for the following components:    WBC 14.15 (*)     RBC 5.61 (*)     MCV 78.4 (*)     MCH 26.0 (*)     Immature Grans % 0.7 (*)     Neutrophils, Absolute 9.87 (*)     Monocytes,  Absolute 1.19 (*)     Immature Grans, Absolute 0.10 (*)     All other components within normal limits   BLOOD GAS, ARTERIAL - Abnormal; Notable for the following components:    pH, Arterial 7.328 (*)     pCO2, Arterial 57.2 (*)     HCO3, Arterial 30.0 (*)     Base Excess, Arterial 2.2 (*)     All other components within normal limits   BNP (IN-HOUSE) - Normal    Narrative:     Among patients with dyspnea, NT-proBNP is highly sensitive for the detection of acute congestive heart failure. In addition NT-proBNP of <300 pg/ml effectively rules out acute congestive heart failure with 99% negative predictive value.     TROPONIN (IN-HOUSE) - Normal    Narrative:     Troponin T Reference Range:  <= 0.03 ng/mL-   Negative for AMI  >0.03 ng/mL-     Abnormal for myocardial necrosis.  Clinicians would have to utilize clinical acumen, EKG, Troponin and serial changes to determine if it is an Acute Myocardial Infarction or myocardial injury due to an underlying chronic condition.    BLOOD GAS, ARTERIAL   CBC AND DIFFERENTIAL    Narrative:     The following orders were created for panel order CBC & Differential.  Procedure                               Abnormality         Status                     ---------                               -----------         ------                     CBC Auto Differential[[id number]]        Abnormal            Final result                 Please view results for these tests on the individual orders.       EKG:   ECG 12 Lead   Preliminary Result   HEART RATE= 125  bpm   RR Interval= 479  ms   NC Interval=   ms   P Horizontal Axis=   deg   P Front Axis=   deg   QRSD Interval= 88  ms   QT Interval= 293  ms   QRS Axis= 92  deg   T Wave Axis= 12  deg   - ABNORMAL ECG -   Atrial fibrillation   Right axis deviation   Minimal ST depression, inferior leads   Electronically Signed By:    Date and Time of Study: 2019-12-08 03:59:32          Meds given in ED:   Medications   sodium chloride 0.9 % flush 10 mL  (has no administration in time range)   ipratropium-albuterol (DUO-NEB) nebulizer solution 3 mL (3 mL Nebulization Given 12/8/19 3066)   ondansetron (ZOFRAN) injection 4 mg (4 mg Intravenous Given 12/8/19 0404)       Imaging results:  Xr Chest 1 View    Result Date: 12/8/2019   No active disease by portable imaging.  This report was finalized on 12/8/2019 5:53 AM by David Tinoco M.D.        Ambulatory status:   - bedrest    Social issues:   Social History     Socioeconomic History   • Marital status: Unknown     Spouse name: Not on file   • Number of children: Not on file   • Years of education: Not on file   • Highest education level: Not on file   Tobacco Use   • Smoking status: Never Smoker   Substance and Sexual Activity   • Alcohol use: No          Alexandra Harrison RN  12/08/19 0564

## 2019-12-08 NOTE — ED TRIAGE NOTES
To ER via EMS.  C/o SOA x 4 days with productive cough.  Pain in left posterior rib area.  Expiratory wheezes throughout per EMS. Solumedrol 125 and Duoneb given PTA.   Pt is normally on O2 2L initial sats 86% on EMS arrival.    Pt appears in moderate respiratory distress.  Pt is in a-fib with RVR.

## 2019-12-08 NOTE — PROGRESS NOTES
Pharmacy Consult: Warfarin Dosing/ Monitoring    Leonor Bhandari is a 64 y.o. female, estimated creatinine clearance is 109.2 mL/min (A) (by C-G formula based on SCr of 0.51 mg/dL (L)). weighing 76.2 kg (168 lb).     has a past medical history of Anxiety and depression, Asthma, Atrial fibrillation (CMS/HCC), Chronic fatigue, COPD (chronic obstructive pulmonary disease) (CMS/HCC), Graves disease, HTN (hypertension), Hyperlipidemia, Hyperthyroidism, Post-menopausal osteoporosis, and Sleep apnea.    Social History     Tobacco Use    Smoking status: Current Some Day Smoker     Packs/day: 0.25     Years: 15.00     Pack years: 3.75     Types: Cigarettes     Start date: 12/8/1990    Smokeless tobacco: Never Used   Substance Use Topics    Alcohol use: No    Drug use: Never       Results from last 7 days   Lab Units 12/08/19  0342   INR  2.10*   HEMOGLOBIN g/dL 14.6   HEMATOCRIT % 44.0   PLATELETS 10*3/mm3 367     Results from last 7 days   Lab Units 12/08/19  0342   SODIUM mmol/L 142   POTASSIUM mmol/L 4.8   CHLORIDE mmol/L 97*   CO2 mmol/L 27.3   BUN mg/dL 9   CREATININE mg/dL 0.51*   CALCIUM mg/dL 9.7   BILIRUBIN mg/dL 0.4   ALK PHOS U/L 106   ALT (SGPT) U/L 24   AST (SGOT) U/L 28   GLUCOSE mg/dL 117*     Anticoagulation history: home med warfarin 6mg MWF and 3mg Tu/Th/Sa/Ramos per patient    Hospital Anticoagulation:  Consulting provider: Dr Montoya  Start date: home med  Indication: A Fib  Target INR: 2-3  Expected duration: lifelong                    Date 2/8            INR 2.1            Warfarin dose 3mg              Potential drug interactions:   Zithromax - increased INR  Aspirin - increased risk of bleed (home med)  Lexapro - increased risk of bleed (home med)  Requip - increased INR (home med)  Methimazole - decreased warfarin effectiveness (home med)  Solu-Medrol - increased risk of bleed    Education complete?/ Date:     Assessment/Plan:  Dose 6mg MWF and 3mg Tu/Th/Sa/Ramos (home dose per patient)  Monitor daily  INR  Follow up 12/9    Will monitor closely for rise in INR due to antibiotics and adjust as needed.     Pharmacy will continue to follow until discharge or discontinuation of warfarin.   Krystal Sun RPH  12/8/2019

## 2019-12-09 NOTE — PROGRESS NOTES
"  Daily Progress Note.   [facility] INTENSIVE CARE  12/9/2019    Patient:  Name:  Leonor Bhandari  MRN:  [id number]  1955  64 y.o.  female         CC:  soa    Interval History:  Patient is a 63yo with copd, chronic hypoxic resp failure, afib follows with Dr Cunningham who presented to the ER with progressive cough, sputum production.  In resp distress placed on NIV.    Tolerated short breaks off of NIV.  No fever since admission.  Acute on chronic hypercap and hypoxic resp failure improving on bipap  Feels better than admission.  Describes some nasal congestion preceding admission.  No chest pain this am.    Physical Exam:  /77   Pulse 96   Temp 97.9 °F (36.6 °C) (Oral)   Resp (!) 29   Ht 157.5 cm (62\")   Wt 76.2 kg (168 lb)   SpO2 93%   BMI 30.73 kg/m²   Body mass index is 30.73 kg/m².    Intake/Output Summary (Last 24 hours) at 12/9/2019 0822  Last data filed at 12/9/2019 0500  Gross per 24 hour   Intake 250 ml   Output 1300 ml   Net -1050 ml   on bipap    General appearance: on bipap, but alert  Eyes: anicteric sclerae, moist conjunctivae; no lid-lag; PERRLA  HENT: on bipap  Neck: Trachea midline; FROM, supple, no thyromegaly or lymphadenopathy  Lungs: barrell chested diminished breath sounds no crackles or wheeze on bipap, with normal respiratory effort and no intercostal retractions  CV: RRR, no MRGs   Abdomen: Soft, non-tender; no masses or HSM  Extremities: No peripheral edema or extremity lymphadenopathy  Skin: Normal temperature, turgor and texture; no rash, ulcers or subcutaneous nodules  Psych: Appropriate affect, alert   Neuro moves all ext, sensation intact    Data Review:  Notable Labs:  Results from last 7 days   Lab Units 12/08/19  0342   WBC 10*3/mm3 14.15*   HEMOGLOBIN g/dL 14.6   PLATELETS 10*3/mm3 367     Results from last 7 days   Lab Units 12/08/19  0342   SODIUM mmol/L 142   POTASSIUM mmol/L 4.8   CHLORIDE mmol/L 97*   CO2 mmol/L 27.3   BUN mg/dL 9   CREATININE " mg/dL 0.51*   GLUCOSE mg/dL 117*   CALCIUM mg/dL 9.7   Estimated Creatinine Clearance: 109.2 mL/min (A) (by C-G formula based on SCr of 0.51 mg/dL (L)).    Imaging:  Reviewed chest images personally from past 3 days    Scheduled meds:      aspirin 81 mg Oral Daily   azithromycin 500 mg Intravenous Q24H   digoxin 250 mcg Oral Daily   escitalopram 10 mg Oral Daily   insulin lispro 0-9 Units Subcutaneous 4x Daily With Meals & Nightly   ipratropium-albuterol 3 mL Nebulization 4x Daily - RT   methIMAzole 5 mg Oral Daily With Breakfast   methylPREDNISolone sodium succinate 40 mg Intravenous Q8H   metoprolol succinate XL 25 mg Oral Daily   rOPINIRole 1 mg Oral Nightly   warfarin 3 mg Oral Once per day on Sun Tue Thu Sat   warfarin 6 mg Oral Once per day on Mon Wed Fri       ASSESSMENT  /  PLAN:  Acute on chronic hypoxemic and hypercapnic respiratory failure  Acute exacerbation of COPD  A. fib  DARLENE on CPAP  Chronic systolic CHF  History of hyperthyroidism  Chronic anticoagulation  Tobacco abuse      duonebs scheduled to q6h  Cont iv solumederol  Send RVP  Resend CBC, RFP  Monitor for fever  Cont azithromycin  Bipap prn wob.  Monitor INR  Monitor in ICU, if maintains off bipap for longer breaks today potential transfer out of icu    Plan of care and patient course was reviewed with multidisciplinary team in morning rounds.  All issues new to me  Managed NIV, acute on chronic hypercapnic resp failure.  Patient is critically ill reliant on BIPAP at present for ae of copd.    Total critical care time was 38 minutes, excluding any separately billable procedure time.        Tuan Esquivel MD  [facility]  12/09/19  8:39 AM

## 2019-12-09 NOTE — PROGRESS NOTES
Pharmacy Consult: Warfarin Dosing/ Monitoring    Leonor Bhandari is a 64 y.o. female, estimated creatinine clearance is 121.1 mL/min (A) (by C-G formula based on SCr of 0.46 mg/dL (L)). weighing 76.2 kg (168 lb).     has a past medical history of Anxiety and depression, Asthma, Atrial fibrillation (CMS/HCC), Chronic fatigue, COPD (chronic obstructive pulmonary disease) (CMS/HCC), Graves disease, HTN (hypertension), Hyperlipidemia, Hyperthyroidism, Post-menopausal osteoporosis, and Sleep apnea.    Social History     Tobacco Use    Smoking status: Current Some Day Smoker     Packs/day: 0.25     Years: 15.00     Pack years: 3.75     Types: Cigarettes     Start date: 12/8/1990    Smokeless tobacco: Never Used   Substance Use Topics    Alcohol use: No    Drug use: Never       Results from last 7 days   Lab Units 12/09/19  0905 12/09/19  0232 12/08/19  0342   INR   --  2.76* 2.10*   HEMOGLOBIN g/dL 12.9  --  14.6   HEMATOCRIT % 40.6  --  44.0   PLATELETS 10*3/mm3 307  --  367     Results from last 7 days   Lab Units 12/09/19  0905 12/08/19  0342   SODIUM mmol/L 139 142   POTASSIUM mmol/L 4.8 4.8   CHLORIDE mmol/L 100 97*   CO2 mmol/L 27.6 27.3   BUN mg/dL 24* 9   CREATININE mg/dL 0.46* 0.51*   CALCIUM mg/dL 9.0 9.7   BILIRUBIN mg/dL  --  0.4   ALK PHOS U/L  --  106   ALT (SGPT) U/L  --  24   AST (SGOT) U/L  --  28   GLUCOSE mg/dL 279* 117*     Anticoagulation history: home med warfarin 6mg MWF and 3mg Tu/Th/Sa/Su per patient    Hospital Anticoagulation:  Consulting provider: Dr Montoya  Start date: home med  Indication: A Fib  Target INR: 2-3  Expected duration: lifelong                    Date 12/8 12/9           INR 2.1 2.767           Warfarin dose 3mg              Potential drug interactions:   Zithromax - increased INR  Aspirin - increased risk of bleed (home med)  Lexapro - increased risk of bleed (home med)  Requip - increased INR (home med)  Methimazole - decreased warfarin effectiveness (home med)  Solu-Medrol -  increased risk of bleed    Education complete?/ Date:     Assessment/Plan:  Concerned about large increase in INR today, probably due to azithromycin. Will hold home regimen and give smaller dose of 1 mg today.    Dose  1mg today, reassess daily  Monitor daily INR  Follow up 12/10    Pharmacy will continue to follow until discharge or discontinuation of warfarin.   Roger Gardner [facility]  12/9/2019

## 2019-12-09 NOTE — PLAN OF CARE
ABG better this am, bipap on all day except for meal   Problem: Patient Care Overview  Goal: Plan of Care Review  Outcome: Ongoing (interventions implemented as appropriate)  Flowsheets (Taken 12/9/2019 1830)  Plan of Care Reviewed With: patient     Problem: Patient Care Overview  Goal: Plan of Care Review  Outcome: Ongoing (interventions implemented as appropriate)  Flowsheets (Taken 12/9/2019 1830)  Plan of Care Reviewed With: patient

## 2019-12-10 NOTE — PROGRESS NOTES
Pharmacy Consult: Warfarin Dosing/ Monitoring    Leonor Bhandari is a 64 y.o. female, estimated creatinine clearance is 150.6 mL/min (A) (by C-G formula based on SCr of 0.37 mg/dL (L)). weighing 76.2 kg (168 lb).     has a past medical history of Anxiety and depression, Asthma, Atrial fibrillation (CMS/HCC), Chronic fatigue, COPD (chronic obstructive pulmonary disease) (CMS/HCC), Graves disease, HTN (hypertension), Hyperlipidemia, Hyperthyroidism, Post-menopausal osteoporosis, and Sleep apnea.    Social History     Tobacco Use    Smoking status: Current Some Day Smoker     Packs/day: 0.25     Years: 15.00     Pack years: 3.75     Types: Cigarettes     Start date: 12/8/1990    Smokeless tobacco: Never Used   Substance Use Topics    Alcohol use: No    Drug use: Never       Results from last 7 days   Lab Units 12/10/19  0236 12/09/19  0905 12/09/19 0232 12/08/19  0342   INR  2.30*  --  2.76* 2.10*   HEMOGLOBIN g/dL 12.9 12.9  --  14.6   HEMATOCRIT % 40.6 40.6  --  44.0   PLATELETS 10*3/mm3 285 307  --  367     Results from last 7 days   Lab Units 12/10/19  0236 12/09/19  0905 12/08/19  0342   SODIUM mmol/L 141 139 142   POTASSIUM mmol/L 4.7 4.8 4.8   CHLORIDE mmol/L 102 100 97*   CO2 mmol/L 30.2* 27.6 27.3   BUN mg/dL 23 24* 9   CREATININE mg/dL 0.37* 0.46* 0.51*   CALCIUM mg/dL 9.3 9.0 9.7   BILIRUBIN mg/dL  --   --  0.4   ALK PHOS U/L  --   --  106   ALT (SGPT) U/L  --   --  24   AST (SGOT) U/L  --   --  28   GLUCOSE mg/dL 182* 279* 117*     Anticoagulation history: home med warfarin 6mg MWF and 3mg Tu/Th/Sa/Su per patient    Hospital Anticoagulation:  Consulting provider: Dr Montoya  Start date: home med  Indication: A Fib  Target INR: 2-3  Expected duration: lifelong                    Date 12/8 12/9 12/10          INR 2.10 2.76 2.30          Warfarin dose 3mg 1 mg             Potential drug interactions:   Zithromax - increased INR  Aspirin - increased risk of bleed (home med)  Lexapro - increased risk of bleed  (home med)  Requip - increased INR (home med)  Methimazole - decreased warfarin effectiveness (home med)  Solu-Medrol - increased risk of bleed    Education complete?/ Date:     Assessment/Plan:  INR decreased to 2.3 today. Will continue to dose daily based on INR due to azithromycin and inpatient status. Will give 2 mg today and reassess tomorrow am.    Dose  2 mg today, reassess daily  Monitor daily INR  Follow up 12/10    Pharmacy will continue to follow until discharge or discontinuation of warfarin.   Roger Gardner, [facility]  12/10/2019

## 2019-12-10 NOTE — PLAN OF CARE
Note  Patient very sleepy this am, but oriented and attempt to place on high flow.  ON 6L Patient remains very tachypnic, using accessory muscles and purse lip breathing. RR = 40.  Patient placed back on Bipap 10 minutes later.Early afternoon patient much more alert and again placed on 8L high flow NC wNote

## 2019-12-10 NOTE — PROGRESS NOTES
Discharge Planning Assessment  [facility]     Patient Name: Leonor Bhandari  MRN: [id number]  Today's Date: 12/10/2019    Admit Date: 12/8/2019    Discharge Needs Assessment     Row Name 12/10/19 1453       Living Environment    Lives With  child(kortney), adult    Current Living Arrangements  home/apartment/condo    Primary Care Provided by  self;child(kortney)    Provides Primary Care For  no one    Family Caregiver if Needed  none    Able to Return to Prior Arrangements  yes       Resource/Environmental Concerns    Resource/Environmental Concerns  none    Transportation Concerns  car, none       Transition Planning    Patient/Family Anticipates Transition to  home with family    Patient/Family Anticipated Services at Transition  none    Transportation Anticipated  family or friend will provide       Discharge Needs Assessment    Concerns to be Addressed  discharge planning    Equipment Currently Used at Home  bipap/cpap;wheelchair;walker, rolling    Anticipated Changes Related to Illness  none    Equipment Needed After Discharge  none    Provided post acute provider list?  Refused        Discharge Plan     Row Name 12/10/19 1450       Plan    Plan  Undecided    Plan Comments  IMM noted.  CCP spoke with patient's son  and daughter at bedside to discuss DC planning.    CCP role explained.  Face sheet verified.  Pt's emergency contact is her daughter Claudette, and son David.   Her primary care provider is Dr Miriam Reyes.  Pt lives in a house with daughter and son.  She uses a wheel chair or a rolling walker to ambulate.  Pt has no current history of Home Health.  She uses [facility] for her oxygen and rrespiratory supplys.  She has not been to rehab in the past.  Pt is independent with ADL's    Pt has transportation home.  She obtains her medication from [facility] on [address].  Plan is undecided.  CCP following        Destination      Coordination has not been started for this encounter.      Durable Medical  Equipment      Coordination has not been started for this encounter.      Dialysis/Infusion      Coordination has not been started for this encounter.      Home Medical Care      Coordination has not been started for this encounter.      Therapy      Coordination has not been started for this encounter.      Community Resources      Coordination has not been started for this encounter.          Demographic Summary    No documentation.       Functional Status    No documentation.       Psychosocial    No documentation.       Abuse/Neglect    No documentation.       Legal    No documentation.       Substance Abuse    No documentation.       Patient Forms    No documentation.           Socorro Nelson RN

## 2019-12-10 NOTE — PLAN OF CARE
Problem: Patient Care Overview  Goal: Plan of Care Review  Outcome: Ongoing (interventions implemented as appropriate)     Problem: Patient Care Overview  Goal: Plan of Care Review  12/10/2019 0454 by Laurie Sagastume RN  Flowsheets  Taken 12/10/2019 0454  Progress: improving  Outcome Summary: Pt remains in ICU. Rested very well and wore the bipap all night. HR 90s-120s in Afib. other VSS. PRN ativan used per pt request. Good UOP. Labs stable. May be able to be transferred to stepdown unit today. WIll continue to [facility].  Taken 12/10/2019 0400  Plan of Care Reviewed With: patient

## 2019-12-10 NOTE — PROGRESS NOTES
"  Daily Progress Note.   [facility] INTENSIVE CARE  12/10/2019    Patient:  Name:  Leonor Bhandari  MRN:  [id number]  1955  64 y.o.  female         CC:  soa    Interval History:  Patient is a 65yo with copd, chronic hypoxic resp failure, afib follows with Dr Cunningham who presented to the ER with progressive cough, sputum production.  In resp distress placed on NIV.    Patient drowsy but will open eyes.  Not conversant today on BiPAP.  This is a change from yesterday.      Physical Exam:  /90   Pulse 102   Temp 97.7 °F (36.5 °C) (Axillary)   Resp (!) 32   Ht 157.5 cm (62\")   Wt 76.2 kg (168 lb)   SpO2 95%   BMI 30.73 kg/m²   Body mass index is 30.73 kg/m².    Intake/Output Summary (Last 24 hours) at 12/10/2019 1111  Last data filed at 12/10/2019 0852  Gross per 24 hour   Intake 740 ml   Output 1500 ml   Net -760 ml   on bipap    General appearance: on bipap, will awaken however not respond appropriately to commands actions  Eyes: anicteric sclerae, moist conjunctivae; no lid-lag; PERRLA  HENT: on bipap  Neck: Trachea midline; FROM, supple, no thyromegaly or lymphadenopathy  Lungs: barrell chested diminished breath sounds no crackles or wheeze on bipap, with normal respiratory effort and no intercostal retractions  CV: Tachy regular, no MRGs   Abdomen: Obese soft, non-tender; no masses or HSM  Extremities: No peripheral edema or extremity lymphadenopathy  Skin: Normal temperature, turgor and texture; no rash, ulcers or subcutaneous nodules  Psych flat affect drowsy not very alert today  Neuro moves all ext, sensation intact    Data Review:  Notable Labs:  Results from last 7 days   Lab Units 12/10/19  0236 12/09/19  0905 12/08/19  0342   WBC 10*3/mm3 11.90* 12.05* 14.15*   HEMOGLOBIN g/dL 12.9 12.9 14.6   PLATELETS 10*3/mm3 285 307 367     Results from last 7 days   Lab Units 12/10/19  0236 12/09/19  0905 12/08/19  0342   SODIUM mmol/L 141 139 142   POTASSIUM mmol/L 4.7 4.8 4.8 "   CHLORIDE mmol/L 102 100 97*   CO2 mmol/L 30.2* 27.6 27.3   BUN mg/dL 23 24* 9   CREATININE mg/dL 0.37* 0.46* 0.51*   GLUCOSE mg/dL 182* 279* 117*   CALCIUM mg/dL 9.3 9.0 9.7   PHOSPHORUS mg/dL 3.2 3.5  --    Estimated Creatinine Clearance: 150.6 mL/min (A) (by C-G formula based on SCr of 0.37 mg/dL (L)).    Imaging:  Reviewed chest images personally from past 3 days    Scheduled meds:      aspirin 81 mg Oral Daily   azithromycin 500 mg Intravenous Q24H   digoxin 250 mcg Oral Daily   escitalopram 10 mg Oral Daily   insulin lispro 0-9 Units Subcutaneous 4x Daily With Meals & Nightly   ipratropium-albuterol 3 mL Nebulization Q6H - RT   methIMAzole 5 mg Oral Daily With Breakfast   methylPREDNISolone sodium succinate 40 mg Intravenous Q8H   metoprolol succinate XL 25 mg Oral Daily   rOPINIRole 1 mg Oral Nightly       ASSESSMENT  /  PLAN:  Acute on chronic hypoxemic and hypercapnic respiratory failure  Acute exacerbation of COPD triggered by Rhinovirus URTI  A. fib  DARLENE on CPAP  Chronic systolic CHF  History of hyperthyroidism  Chronic anticoagulation  Tobacco abuse    duonebs scheduled to q6h  Cont iv solumederol  Supportive care for rhinovirus infection  Recheck chest x-ray  Cont azithromycin  Needs a stat ABG given lethargy  Monitor INR    Plan of care and patient course was reviewed with multidisciplinary team in morning rounds.  Managed NIV, acute on chronic hypercapnic resp failure.  Patient is critically ill reliant on BIPAP at present for ae of copd.    Total critical care time was 35 minutes, excluding any separately billable procedure time.        Tuan Esquivel MD  [facility]  12/10/19  [id number]

## 2019-12-11 NOTE — NURSING NOTE
Precedex gtt started at the beginning of shift. Pt very anxious and stated needed something to calm pt down, HR was in 140's and oxygenation was mid 80's. Throughout the shift pt maintained calm until precedex gtt tried to be weaned off. Pt may benefit with po anxiety med.

## 2019-12-11 NOTE — PLAN OF CARE
Patient on 4L NC all day.  Able to eat and drink without feeling short of air.  Up to bedside commode once with assistance but still very short of air with exertion.  Patient placed back on cardizem this evening for HR of 120-130.  Will continue to monitor.

## 2019-12-11 NOTE — PROGRESS NOTES
Clinical Pharmacy Services: Medication History    Leonor Bhandari is a 64 y.o. female presenting to [facility] for COPD with acute exacerbation (CMS/[facility]) [J44.1]  Acute respiratory failure with hypoxia and hypercapnia (CMS/[facility]) [J96.01, J96.02]    She  has a past medical history of Anxiety and depression, Asthma, Atrial fibrillation (CMS/[facility]), Chronic fatigue, COPD (chronic obstructive pulmonary disease) (CMS/[facility]), Graves disease, HTN (hypertension), Hyperlipidemia, Hyperthyroidism, Post-menopausal osteoporosis, and Sleep apnea.    Allergies as of 12/08/2019 - Reviewed 12/08/2019   Allergen Reaction Noted    Ramipril Hives 02/20/2019       Medication information was obtained from: Patient    Prior to Admission Medications       Prescriptions Last Dose Informant Patient Reported? Taking?    aspirin (aspirin EC) 81 MG EC tablet  Self Yes Yes    Take 81 mg by mouth.    digoxin (LANOXIN) 250 MCG tablet  Self Yes Yes    Take 250 mcg by mouth Daily.    dilTIAZem (CARDIZEM) 90 MG tablet  Self Yes Yes    Take 90 mg by mouth 4 (Four) Times a Day.    diphenhydrAMINE (BENADRYL) 25 mg capsule  Self Yes Yes    Take 25 mg by mouth At Night As Needed for Sleep.    escitalopram (LEXAPRO) 20 MG tablet  Self Yes Yes    Take 20 mg by mouth Daily.    guaiFENesin (MUCINEX) 600 MG 12 hr tablet  Self Yes Yes    Take 600 mg by mouth 2 (Two) Times a Day As Needed for Cough or Congestion.    ipratropium-albuterol (DUO-NEB) 0.5-2.5 mg/mL nebulizer  Self Yes Yes    Inhale 3 mL Every 4 (Four) Hours.    methIMAzole (TAPAZOLE) 10 MG tablet  Self Yes Yes    Take 5 mg by mouth 2 (Two) Times a Day.    predniSONE (DELTASONE) 5 MG tablet  Self Yes Yes    Take 5 mg by mouth 2 (Two) Times a Day.    rOPINIRole (REQUIP) 1 MG tablet  Self Yes Yes    Take 2 mg by mouth Every Night. Take 1 hour before bedtime.     VENTOLIN  (90 BASE) MCG/ACT inhaler  Self Yes Yes    Inhale 2 puffs Every 4 (Four) Hours As Needed for Wheezing or Shortness  of Air.    warfarin (COUMADIN) 6 MG tablet  Self Yes Yes    Take 6 mg by mouth Take As Directed. 6mg MWF and 3mg Tu/th/Sa/Su          Medication notes: Removed metoprolol from home medication list. Added diltiazem to home medication list. Updated escitalopram strength. Updated dosing instructions for mucinex and methimazole.     This medication list is complete to the best of my knowledge as of 12/11/2019    Please call if questions.    Rosmery You, Pharmacy Intern  12/11/2019 1:32 PM

## 2019-12-11 NOTE — PROGRESS NOTES
"  Daily Progress Note.   [facility] INTENSIVE CARE  12/11/2019    Patient:  Name:  Leonor Bhandari  MRN:  [id number]  1955  64 y.o.  female         CC:  soa    Interval History / Review of systems  Patient is a 63yo with copd, chronic hypoxic resp failure, afib follows with Dr Cunningham who presented to the ER with progressive cough, sputum production.  In resp distress placed on NIV.    Patient is awake alert.  She is just returned from the bedside commode a little bit short of breath at present time.  Does request some Mucinex.  She is better than prior days.  Still with increased respiratory rate shortness of breath and cough.  There is some sputum production.  No high fevers overnight.  No chest pain.  Does feel breathing is a little short at present time.  No chest pain    Physical Exam:  /76   Pulse 98   Temp 98.8 °F (37.1 °C) (Oral)   Resp (!) 33   Ht 157.5 cm (62\")   Wt 75.5 kg (166 lb 7.2 oz)   SpO2 94%   BMI 30.44 kg/m²   Body mass index is 30.44 kg/m².    Intake/Output Summary (Last 24 hours) at 12/11/2019 1125  Last data filed at 12/11/2019 0900  Gross per 24 hour   Intake 1043 ml   Output 1000 ml   Net 43 ml   Nasal cannula    General appearance: Awake alert.  Conversant  Eyes: anicteric sclerae, moist conjunctivae; no lid-lag; PERRLA  HENT: Clear OP somewhat dry mucous membranes  Neck: Trachea midline; FROM, supple, no thyromegaly or lymphadenopathy  Lungs: barrell chested diminished breath sounds no crackles or wheeze , with increased respiratory effort and intercostal retractions  CV: Tachy irregular, no MRGs   Abdomen: Obese soft, non-tender; no masses or HSM  Extremities: No peripheral edema or extremity lymphadenopathy  Skin: Normal temperature, turgor and texture; no rash, ulcers or subcutaneous nodules  Psych normal affect alert and oriented  Neuro moves all ext, sensation intact    Data Review:  Notable Labs:  Results from last 7 days   Lab Units 12/11/19  0534 " 12/10/19  0236 12/09/19  0905 12/08/19  0342   WBC 10*3/mm3 8.21 11.90* 12.05* 14.15*   HEMOGLOBIN g/dL 13.9 12.9 12.9 14.6   PLATELETS 10*3/mm3 292 285 307 367     Results from last 7 days   Lab Units 12/11/19  0534 12/10/19  0236 12/09/19  0905 12/08/19  0342   SODIUM mmol/L 143 141 139 142   POTASSIUM mmol/L 4.8 4.7 4.8 4.8   CHLORIDE mmol/L 101 102 100 97*   CO2 mmol/L 30.5* 30.2* 27.6 27.3   BUN mg/dL 21 23 24* 9   CREATININE mg/dL 0.36* 0.37* 0.46* 0.51*   GLUCOSE mg/dL 178* 182* 279* 117*   CALCIUM mg/dL 9.3 9.3 9.0 9.7   PHOSPHORUS mg/dL 3.1 3.2 3.5  --    Estimated Creatinine Clearance: 154.2 mL/min (A) (by C-G formula based on SCr of 0.36 mg/dL (L)).    Imaging:  Reviewed chest images personally from past 3 days    Scheduled meds:      aspirin 81 mg Oral Daily   azithromycin 500 mg Intravenous Q24H   digoxin 250 mcg Oral Daily   escitalopram 10 mg Oral Daily   guaiFENesin 1,200 mg Oral Q12H   insulin lispro 0-9 Units Subcutaneous 4x Daily With Meals & Nightly   ipratropium-albuterol 3 mL Nebulization Q6H - RT   methIMAzole 5 mg Oral Daily With Breakfast   methylPREDNISolone sodium succinate 40 mg Intravenous Q8H   metoprolol succinate XL 25 mg Oral Daily   rOPINIRole 1 mg Oral Nightly       ASSESSMENT  /  PLAN:  Acute on chronic hypoxemic and hypercapnic respiratory failure  Acute exacerbation of COPD triggered by Rhinovirus URTI  A. fib  DARLENE on CPAP  Chronic systolic CHF  History of hyperthyroidism  Chronic anticoagulation  Tobacco abuse    duonebs scheduled to q6h  Cont iv solumederol  Supportive care for rhinovirus infection  Recheck chest x-ray this morning  Cont azithromycin  Start Mucinex  Stop Precedex  Start low-dose Xanax p.o.  Continue to monitor in ICU  BiPAP as needed for work of breathing and at night  Monitor INR    Plan of care and patient course was reviewed with multidisciplinary team in morning rounds.        Tuan Esquivel MD  [facility]  12/11/19  [id number]

## 2019-12-11 NOTE — PROGRESS NOTES
Pharmacy Consult: Warfarin Dosing/ Monitoring    Leonor Bhandari is a 64 y.o. female, estimated creatinine clearance is 154.2 mL/min (A) (by C-G formula based on SCr of 0.36 mg/dL (L)). weighing 75.5 kg (166 lb 7.2 oz).     has a past medical history of Anxiety and depression, Asthma, Atrial fibrillation (CMS/HCC), Chronic fatigue, COPD (chronic obstructive pulmonary disease) (CMS/HCC), Graves disease, HTN (hypertension), Hyperlipidemia, Hyperthyroidism, Post-menopausal osteoporosis, and Sleep apnea.    Social History     Tobacco Use   • Smoking status: Current Some Day Smoker     Packs/day: 0.25     Years: 15.00     Pack years: 3.75     Types: Cigarettes     Start date: 12/8/1990   • Smokeless tobacco: Never Used   Substance Use Topics   • Alcohol use: No   • Drug use: Never       Results from last 7 days   Lab Units 12/11/19  0534 12/10/19  0236 12/09/19  0905 12/09/19  0232 12/08/19  0342   INR  2.25* 2.30*  --  2.76* 2.10*   HEMOGLOBIN g/dL 13.9 12.9 12.9  --  14.6   HEMATOCRIT % 45.2 40.6 40.6  --  44.0   PLATELETS 10*3/mm3 292 285 307  --  367     Results from last 7 days   Lab Units 12/11/19  0534 12/10/19  0236 12/09/19  0905 12/08/19  0342   SODIUM mmol/L 143 141 139 142   POTASSIUM mmol/L 4.8 4.7 4.8 4.8   CHLORIDE mmol/L 101 102 100 97*   CO2 mmol/L 30.5* 30.2* 27.6 27.3   BUN mg/dL 21 23 24* 9   CREATININE mg/dL 0.36* 0.37* 0.46* 0.51*   CALCIUM mg/dL 9.3 9.3 9.0 9.7   BILIRUBIN mg/dL  --   --   --  0.4   ALK PHOS U/L  --   --   --  106   ALT (SGPT) U/L  --   --   --  24   AST (SGOT) U/L  --   --   --  28   GLUCOSE mg/dL 178* 182* 279* 117*     Anticoagulation history: home med warfarin 6mg MWF and 3mg Tu/Th/Sa/[place] per patient    Hospital Anticoagulation:  Consulting provider: Dr Montoya  Start date: home med  Indication: A Fib  Target INR: 2-3  Expected duration: lifelong                    Date 12/8 12/9 12/10 12/11         INR 2.10 2.76 2.30 2.25         Warfarin dose 3mg 1 mg 2 mg 3 mg            Potential drug interactions:   Zithromax - increased INR  Aspirin - increased risk of bleed (home med)  Lexapro - increased risk of bleed (home med)  Requip - increased INR (home med)  Methimazole - decreased warfarin effectiveness (home med)  Solu-Medrol - increased risk of bleed    Education complete?/ Date:     Assessment/Plan:  INR is 2.25  today. Still on iv azithromycin. Will give 3 mg today to prevent further INR decline and reassess in am.     Dose  3 mg today, reassess daily  Monitor daily INR  Follow up 12/10    Pharmacy will continue to follow until discharge or discontinuation of warfarin.   Roger Gardner, [facility]  12/11/2019

## 2019-12-12 NOTE — CONSULTS
Date of Consultation: 19    Referral Provider: Rufina Montoya MD     Reason for Consultation: Atrial fibrillation with rapid response.    Encounter Provider: Amaury Post MD    Group of Service: [facility]     Patient Name: Leonor Bhandari    :1955    Chief complaint:  Shortness of breath, respiratory failure.    History of Present Illness:     Ms Bhandari is a 64 year old patient normally followed by [facility] (Dr Jaramillo) with a history of paroxysmal atrial fibrillation (on warfarin), asthma, COPD (oxygen dependent), steroid induced diabetes, hypertension, DARLENE (uses CPAP) and hyperthyroidism.  She has been in the hospital several times with respiratory failure and intubated. She was last seen in the office in 2019.  She had been in the office in March after running out of her medications and she was found to be in rapid atrial fibrillation. Since her medications were restarted she was feeling much better.  Her metoprolol was changed to diltiazem during an hospitalized with pneumonia.  Holter monitor was done that showed atrial flutter with adequately controlled heart rate.  ECHO was repeated which showed EF 55-60% with mildly dilated LA, mild aortic stenosis.     She was admitted on 2019 with SOA and cough with COPD execerbation and placed on BIPAP.She was found to have Rhinovirus  INR was > 2 on admission.  EKG showed atrial fibrillation with 's.  Her rate has been higher at times while she has been here, including into the 130's earlier today.  She is still on Cardizem gtt at 5 mg/hr.  She is also on Digoxin 0.25 mg per day and Toprol XL 25 mg per day.  She is better from a respiratory standpoint, and is slowly improving.  Her INR is therapeutic at 2.41.       Previous Cardiac Testing  Holter Monitor 2019  Procedure: Holter Report  Indications:  Atrial Fibrillation  Dyspnea    Symptoms: Diary submitted with symptoms noted    Findings: This  Holter recorded 24 hours of continous electrocardiographic data.  The minimum heart rate was 69 bpm, the average heart rate was 80 bpm, the maximum heart rate was 124 bpm.  The longest R-R interval was 1.8 seconds.  There was infrequent   (0.5%) ventricular ectopic beats.  There were no supraventricular ectopic beats noted.  The primary underlying rhythm was atrial flutter, the flutter did appear to be typical possibly.  The heart rates were overall controlled.    Study Conclusions: Atrial flutter with adequately controlled heart rates    ECHO 2019  Summary   The estimated ejection fraction is 55-60%.   Global left ventricular wall motion and contractility are within normal   limits.   Borderline left ventricle wall thickness.   The left atrium is mildly dilated.   Mild aortic stenosis with peak gradient of 15 and mean gradient of 7mmHg.    Stress Test 2019     CONCLUSIONS     Summary   Equivocal pharmacological stress SPECT Myocardial Perfusion Imaging. No   ischemia. There is a small area of mild, fixed hypoperfusion in the apex   that has a appearance of normal apical thinning and breast attenuation.   However, the apex is severely hypokinetic on gated SPECT images, cannot rule   out previous apical infarct.    Past Medical History:   Diagnosis Date   • Anxiety and depression    • Asthma    • Atrial fibrillation (CMS/HCC)    • Chronic fatigue    • COPD (chronic obstructive pulmonary disease) (CMS/HCC)    • Graves disease    • HTN (hypertension)    • Hyperlipidemia    • Hyperthyroidism    • Post-menopausal osteoporosis    • Sleep apnea          Past Surgical History:   Procedure Laterality Date   • APPENDECTOMY     •  SECTION     • TONSILLECTOMY AND ADENOIDECTOMY           Allergies   Allergen Reactions   • Ramipril Hives         No current facility-administered medications on file prior to encounter.      Current Outpatient Medications on File Prior to Encounter   Medication Sig Dispense  Refill   • aspirin (aspirin EC) 81 MG EC tablet Take 81 mg by mouth.     • digoxin (LANOXIN) 250 MCG tablet Take 250 mcg by mouth Daily.     • dilTIAZem (CARDIZEM) 90 MG tablet Take 90 mg by mouth 4 (Four) Times a Day.     • diphenhydrAMINE (BENADRYL) 25 mg capsule Take 25 mg by mouth At Night As Needed for Sleep.     • escitalopram (LEXAPRO) 20 MG tablet Take 20 mg by mouth Daily.     • guaiFENesin (MUCINEX) 600 MG 12 hr tablet Take 600 mg by mouth 2 (Two) Times a Day As Needed for Cough or Congestion.     • ipratropium-albuterol (DUO-NEB) 0.5-2.5 mg/mL nebulizer Inhale 3 mL Every 4 (Four) Hours.     • methIMAzole (TAPAZOLE) 10 MG tablet Take 5 mg by mouth 2 (Two) Times a Day.     • predniSONE (DELTASONE) 5 MG tablet Take 5 mg by mouth 2 (Two) Times a Day.     • rOPINIRole (REQUIP) 1 MG tablet Take 2 mg by mouth Every Night. Take 1 hour before bedtime.      • VENTOLIN  (90 BASE) MCG/ACT inhaler Inhale 2 puffs Every 4 (Four) Hours As Needed for Wheezing or Shortness of Air.     • warfarin (COUMADIN) 6 MG tablet Take 6 mg by mouth Take As Directed. 6mg MWF and 3mg Tu/th/Sa/Su           Social History     Socioeconomic History   • Marital status: Unknown     Spouse name: Not on file   • Number of children: Not on file   • Years of education: Not on file   • Highest education level: Not on file   Tobacco Use   • Smoking status: Current Some Day Smoker     Packs/day: 0.25     Years: 15.00     Pack years: 3.75     Types: Cigarettes     Start date: 12/8/1990   • Smokeless tobacco: Never Used   Substance and Sexual Activity   • Alcohol use: No   • Drug use: Never   • Sexual activity: Defer         History reviewed. No pertinent family history.    REVIEW OF SYSTEMS:    Pertinent positives were noted in the HPI above.  Otherwise, all other systems were reviewed and are negative.     Objective:     Vitals:    12/12/19 1531 12/12/19 1606 12/12/19 1700 12/12/19 1800   BP:       BP Location:       Patient Position:      "  Pulse: 92 117     Resp:   (!) 32 (!) 34   Temp:       TempSrc:       SpO2: (!) 88% 90%  (!) 88%   Weight:       Height:         Body mass index is 28.99 kg/m².  Flowsheet Rows      First Filed Value   Admission Height  162.6 cm (64\") Documented at 12/08/2019 0312   Admission Weight  79.9 kg (176 lb 3.2 oz) Documented at 12/08/2019 0355           General:    No acute distress, alert and oriented x4, pleasant                   Head:    Normocephalic, atraumatic.   Eyes:          Conjunctivae and sclerae normal, no icterus, PERRLA   Throat:   No oral lesions, no thrush, oral mucosa moist.    Neck:   Supple, trachea midline.   Lungs:     Decreased breath sounds bilaterally     Heart:    Irregularly irregular rhythm with a mildly tachy rate.  II/VI SM RUSB and LSUB.   Abdomen:     Soft, non-tender, non-distended, positive bowel sounds.    Extremities:   No clubbing, cyanosis, or edema.     Pulses:   Pulses palpable and equal bilaterally.    Skin:   No bleeding or rash.   Neuro:   Non-focal.  Moves all extremities well.    Psychiatric:   Normal mood and affect.         Lab Review:                Results from last 7 days   Lab Units 12/12/19  0502   SODIUM mmol/L 138   POTASSIUM mmol/L 4.0   CHLORIDE mmol/L 100   CO2 mmol/L 28.1   BUN mg/dL 17   CREATININE mg/dL 0.30*   GLUCOSE mg/dL 122*   CALCIUM mg/dL 9.3     Results from last 7 days   Lab Units 12/08/19  0342   TROPONIN T ng/mL <0.010     Results from last 7 days   Lab Units 12/12/19  0502   WBC 10*3/mm3 8.06   HEMOGLOBIN g/dL 14.1   HEMATOCRIT % 44.2   PLATELETS 10*3/mm3 293     Results from last 7 days   Lab Units 12/12/19  0502 12/11/19  0534 12/10/19  0236   INR  2.41* 2.25* 2.30*                 Telemetry      EKG 12/08/2019      EKG (reviewed by me personally):      Assessment:   1. Acute on chronic hypoxic and hypercapnic respiratory failure  2. COPD with acute exacerbation  3. Rhinovirus URI  4. Paroxysmal atrial fibrillation with RVR  5. Chronic diastolic " CHF  6. Mild aortic stenosis  7. Tobacco abuse  8. Hyperthyroidism, on methimazole    Plan:       The patient seems to be doing better.  Her respiratory status has improved, and her heart rate is down to about 110.  She remains on a Cardizem drip at 5 mg/h.  I am going to increase her Toprol-XL to 25 mg twice a day, and I will keep her on the oral digoxin at 250 mcg/day.  Hopefully, the Cardizem drip can be weaned off.  She is therapeutic with her INR, and she will continue on the warfarin.  She does not appear to be volume overloaded or in congestive heart failure.    Thank you very much for this consult.    Kishan Post M.D.

## 2019-12-12 NOTE — PROGRESS NOTES
Pharmacy Consult: Warfarin Dosing/ Monitoring    Leonor Bhandari is a 64 y.o. female, estimated creatinine clearance is 180.5 mL/min (A) (by C-G formula based on SCr of 0.3 mg/dL (L)). weighing 71.9 kg (158 lb 8.2 oz).     has a past medical history of Anxiety and depression, Asthma, Atrial fibrillation (CMS/HCC), Chronic fatigue, COPD (chronic obstructive pulmonary disease) (CMS/HCC), Graves disease, HTN (hypertension), Hyperlipidemia, Hyperthyroidism, Post-menopausal osteoporosis, and Sleep apnea.    Social History     Tobacco Use    Smoking status: Current Some Day Smoker     Packs/day: 0.25     Years: 15.00     Pack years: 3.75     Types: Cigarettes     Start date: 12/8/1990    Smokeless tobacco: Never Used   Substance Use Topics    Alcohol use: No    Drug use: Never       Results from last 7 days   Lab Units 12/12/19  0502 12/11/19  0534 12/10/19  0236 12/09/19  0905 12/09/19  0232 12/08/19  0342   INR  2.41* 2.25* 2.30*  --  2.76* 2.10*   HEMOGLOBIN g/dL 14.1 13.9 12.9 12.9  --  14.6   HEMATOCRIT % 44.2 45.2 40.6 40.6  --  44.0   PLATELETS 10*3/mm3 293 292 285 307  --  367     Results from last 7 days   Lab Units 12/12/19  0502 12/11/19  0534 12/10/19  0236  12/08/19  0342   SODIUM mmol/L 138 143 141   < > 142   POTASSIUM mmol/L 4.0 4.8 4.7   < > 4.8   CHLORIDE mmol/L 100 101 102   < > 97*   CO2 mmol/L 28.1 30.5* 30.2*   < > 27.3   BUN mg/dL 17 21 23   < > 9   CREATININE mg/dL 0.30* 0.36* 0.37*   < > 0.51*   CALCIUM mg/dL 9.3 9.3 9.3   < > 9.7   BILIRUBIN mg/dL  --   --   --   --  0.4   ALK PHOS U/L  --   --   --   --  106   ALT (SGPT) U/L  --   --   --   --  24   AST (SGOT) U/L  --   --   --   --  28   GLUCOSE mg/dL 122* 178* 182*   < > 117*    < > = values in this interval not displayed.     Anticoagulation history: home med warfarin 6mg MWF and 3mg Tu/Th/Sa/Su per patient    Hospital Anticoagulation:  Consulting provider: Dr Montoya  Start date: home med  Indication: A Fib  Target INR: 2-3  Expected duration:  lifelong                    Date 12/8 12/9 12/10 12/11 12/12        INR 2.10 2.76 2.30 2.25 2.41        Warfarin dose 3mg 1 mg 2 mg 3 mg           Potential drug interactions:   Zithromax - increased INR  Aspirin - increased risk of bleed (home med)  Lexapro - increased risk of bleed (home med)  Requip - increased INR (home med)  Methimazole - decreased warfarin effectiveness (home med)  Solu-Medrol - increased risk of bleed    Education complete?/ Date:     Assessment/Plan:  INR is 2.41 today. Azithromycin has been completed.  Will give 3 mg again today and recheck in the am.     Dose  3 mg today, reassess daily  Monitor daily INR  Follow up 12/10    Pharmacy will continue to follow until discharge or discontinuation of warfarin.   Roger Gardner, [facility]  12/12/2019

## 2019-12-12 NOTE — PROGRESS NOTES
"  Daily Progress Note.   [facility]  12/12/2019    Patient:  Name:  Leonor Bhandari  MRN:  [id number]  1955  64 y.o.  female         CC:  soa    Interval History / Review of systems  Patient is a 65yo with copd, chronic hypoxic resp failure, afib follows with Dr Cunningham who presented to the ER with progressive cough, sputum production.  In resp distress placed on NIV.    Made progress yesterday.  Tolerated long times off of the BiPAP.  Did sleep with the BiPAP.  2 L nasal cannula at present.    Physical Exam:  /72   Pulse 114   Temp 98.3 °F (36.8 °C) (Oral)   Resp 28   Ht 157.5 cm (62\")   Wt 71.9 kg (158 lb 8.2 oz)   SpO2 92%   BMI 28.99 kg/m²   Body mass index is 28.99 kg/m².    Intake/Output Summary (Last 24 hours) at 12/12/2019 0723  Last data filed at 12/12/2019 0500  Gross per 24 hour   Intake 2961.25 ml   Output 3700 ml   Net -738.75 ml   Nasal cannula    General appearance: Awake alert.  Conversant  Eyes: anicteric sclerae, moist conjunctivae; no lid-lag; PERRLA  HENT: Clear OP somewhat dry mucous membranes  Neck: Trachea midline; FROM, supple, no thyromegaly or lymphadenopathy  Lungs: barrell chested diminished breath sounds no crackles or wheeze , with increased respiratory effort and intercostal retractions  CV: Tachy irregular, no MRGs   Abdomen: Obese soft, non-tender; no masses or HSM  Extremities: No peripheral edema or extremity lymphadenopathy  Skin: Normal temperature, turgor and texture; no rash, ulcers or subcutaneous nodules  Psych normal affect alert and oriented  Neuro moves all ext, sensation intact    Data Review:  Notable Labs:  Results from last 7 days   Lab Units 12/12/19  0502 12/11/19  0534 12/10/19  0236 12/09/19  0905 12/08/19  0342   WBC 10*3/mm3 8.06 8.21 11.90* 12.05* 14.15*   HEMOGLOBIN g/dL 14.1 13.9 12.9 12.9 14.6   PLATELETS 10*3/mm3 293 292 285 307 367     Results from last 7 days   Lab Units 12/12/19  0502 12/11/19  0534 " 12/10/19  0236 12/09/19  0905 12/08/19  0342   SODIUM mmol/L 138 143 141 139 142   POTASSIUM mmol/L 4.0 4.8 4.7 4.8 4.8   CHLORIDE mmol/L 100 101 102 100 97*   CO2 mmol/L 28.1 30.5* 30.2* 27.6 27.3   BUN mg/dL 17 21 23 24* 9   CREATININE mg/dL 0.30* 0.36* 0.37* 0.46* 0.51*   GLUCOSE mg/dL 122* 178* 182* 279* 117*   CALCIUM mg/dL 9.3 9.3 9.3 9.0 9.7   PHOSPHORUS mg/dL 3.4 3.1 3.2 3.5  --    Estimated Creatinine Clearance: 180.5 mL/min (A) (by C-G formula based on SCr of 0.3 mg/dL (L)).    Imaging:  Reviewed chest images personally from past 3 days    Scheduled meds:      aspirin 81 mg Oral Daily   azithromycin 500 mg Intravenous Q24H   digoxin 250 mcg Oral Daily   escitalopram 10 mg Oral Daily   guaiFENesin 1,200 mg Oral Q12H   insulin lispro 0-9 Units Subcutaneous 4x Daily With Meals & Nightly   ipratropium-albuterol 3 mL Nebulization Q6H - RT   methIMAzole 5 mg Oral Daily With Breakfast   methylPREDNISolone sodium succinate 40 mg Intravenous Q8H   metoprolol succinate XL 25 mg Oral Daily   rOPINIRole 1 mg Oral Nightly       ASSESSMENT  /  PLAN:  Acute on chronic hypoxemic and hypercapnic respiratory failure  Acute exacerbation of COPD triggered by Rhinovirus URTI  A. fib  DARLENE on CPAP  Chronic systolic CHF  History of hyperthyroidism  Chronic anticoagulation  Tobacco abuse    duonebs scheduled to q6h  Cont iv solumederol  Supportive care for rhinovirus infection  Repeat chest x-ray yesterday without any signs of focal infiltrate  Cont azithromycin for anti-inflammatory process with acute exacerbation of her COPD  Continue Mucinex  Cont low-dose Xanax p.o. as needed for anxiety.  Transition out of the ICU  BiPAP as needed for work of breathing and at night  Monitor INR  Diltiazem for atrial fibrillation with RVR  Cardiology consult for afib rvr,seems to be worsened this am.  Patient follows with Dr. Chucho Cunningham as the patient is being moved out of the ICU I will go ahead and consult him to see if he would like to  assume care.    Plan of care and patient course was reviewed with multidisciplinary team in morning rounds.        Tuan Esquivel MD  [facility]  12/12/19  1033am

## 2019-12-12 NOTE — NURSING NOTE
Pt on 2L NC this am. Slept with bipap. Pt remains SOA with exertion.  Prn xanax given to control anxiety. Pt states feeling better. Cardizem gtt infusing.

## 2019-12-13 NOTE — PLAN OF CARE
Problem: Patient Care Overview  Goal: Plan of Care Review  Outcome: Ongoing (interventions implemented as appropriate)  Flowsheets  Taken 12/13/2019 1841 by Shira Bryson, RN  Progress: improving  Outcome Summary: VSS, off of Cardizem gtt and tolerating PO Cardizem, HR ranging to high 80's to low 100's, On 2L of O2.  Purewick.  IV steroids, blood glucose stable.  No c/o pain. Xanax BID  Taken 12/13/2019 0800 by Margarita Maher, RN  Plan of Care Reviewed With: patient

## 2019-12-13 NOTE — PROGRESS NOTES
"[facility]    Patient Name: Leonor Bhandari  :1955  64 y.o.  LOS: 5  Encounter Provider: JAYCOB Hinton      Patient Care Team:  Reyes, Miriam Mercado, MD as PCP - General (Family Medicine)  Reyes, Miriam Mercado, MD as PCP - Claims Attributed    Chief Complaint:  F/U SOA, Resp Failure, PAF    Interval History: Continues with some SOA from Rhinovirus. Denies chest pain, edema. Diuresing well        Objective   Vital Signs  Temp:  [97.7 °F (36.5 °C)-98.9 °F (37.2 °C)] 98.5 °F (36.9 °C)  Heart Rate:  [] 90  Resp:  [19-34] 20  BP: (126-157)/(67-93) 134/72    Intake/Output Summary (Last 24 hours) at 2019 0832  Last data filed at 2019 0600  Gross per 24 hour   Intake 1805.58 ml   Output 3700 ml   Net -1894.42 ml     Flowsheet Rows      First Filed Value   Admission Height  162.6 cm (64\") Documented at 2019 0312   Admission Weight  79.9 kg (176 lb 3.2 oz) Documented at 2019 0355            Physical Exam   Constitutional: She is oriented to person, place, and time. Vital signs are normal. She appears well-developed and well-nourished. She is active.   Eyes: Conjunctivae are normal.   Neck: Carotid bruit is not present.   Cardiovascular: Normal rate, regular rhythm and normal heart sounds.   Pulmonary/Chest: She has decreased breath sounds. She has wheezes.   Abdominal: Normal appearance.   Musculoskeletal:   No cyanosis, clubbing, edema  Normal ROM   Neurological: She is alert and oriented to person, place, and time. GCS eye subscore is 4. GCS verbal subscore is 5. GCS motor subscore is 6.   Skin: Skin is warm, dry and intact.   Psychiatric: She has a normal mood and affect. Her speech is normal and behavior is normal. Judgment and thought content normal. Cognition and memory are normal.       Results Review:    Results from last 7 days   Lab Units 19  0619   SODIUM mmol/L 139   POTASSIUM mmol/L 4.1   CHLORIDE mmol/L 105   CO2 mmol/L 27.5   BUN mg/dL " 18   CREATININE mg/dL 0.28*   GLUCOSE mg/dL 160*   CALCIUM mg/dL 8.8     Results from last 7 days   Lab Units 12/08/19  0342   TROPONIN T ng/mL <0.010     Results from last 7 days   Lab Units 12/13/19  0619   WBC 10*3/mm3 9.76   HEMOGLOBIN g/dL 13.7   HEMATOCRIT % 42.2   PLATELETS 10*3/mm3 300     Results from last 7 days   Lab Units 12/13/19  0619 12/12/19  0502 12/11/19  0534   INR  2.69* 2.41* 2.25*                       Medication Review:     aspirin 81 mg Oral Daily   digoxin 250 mcg Oral Daily   escitalopram 20 mg Oral Daily   guaiFENesin 1,200 mg Oral Q12H   insulin lispro 0-9 Units Subcutaneous 4x Daily With Meals & Nightly   ipratropium-albuterol 3 mL Nebulization Q6H - RT   methIMAzole 5 mg Oral Daily With Breakfast   methylPREDNISolone sodium succinate 40 mg Intravenous Q8H   metoprolol succinate XL 25 mg Oral Q12H   rOPINIRole 1 mg Oral Nightly          dilTIAZem 5-15 mg/hr Last Rate: 2.5 mg/hr (12/13/19 0400)   Pharmacy to dose warfarin         Assessment/Plan   1. PAF with RVR  2. Acute on chronic hypoxic and hypercapnic respiratory failure  3. COPD with acute exacerbation  4. Rhinovirus URI  5. Chronic dCHF  6. Mild aortic stenosis  7. Tobacco abuse  8. Hyperthyroidism    -Rate 90s-120s.  Will restart home diltiazem at 30mg Q6 and stop diltiazem drip at noon. Continue metoprolol succinate and digoxin. AC with Coumadin, INR therapeutic.    -Appears euvolemic.     JAYCOB Hinton  [facility]  12/13/19  8:32 AM

## 2019-12-13 NOTE — PROGRESS NOTES
Pharmacy Consult: Warfarin Dosing/ Monitoring    Leonor Bhandari is a 64 y.o. female, estimated creatinine clearance is 195.3 mL/min (A) (by C-G formula based on SCr of 0.28 mg/dL (L)). weighing 73.4 kg (161 lb 13.1 oz).     has a past medical history of Anxiety and depression, Asthma, Atrial fibrillation (CMS/HCC), Chronic fatigue, COPD (chronic obstructive pulmonary disease) (CMS/HCC), Graves disease, HTN (hypertension), Hyperlipidemia, Hyperthyroidism, Post-menopausal osteoporosis, and Sleep apnea.    Social History     Tobacco Use    Smoking status: Current Some Day Smoker     Packs/day: 0.25     Years: 15.00     Pack years: 3.75     Types: Cigarettes     Start date: 12/8/1990    Smokeless tobacco: Never Used   Substance Use Topics    Alcohol use: No    Drug use: Never       Results from last 7 days   Lab Units 12/13/19  0619 12/12/19  0502 12/11/19  0534 12/10/19  0236 12/09/19  0905 12/09/19  0232 12/08/19  0342   INR  2.69* 2.41* 2.25* 2.30*  --  2.76* 2.10*   HEMOGLOBIN g/dL 13.7 14.1 13.9 12.9 12.9  --  14.6   HEMATOCRIT % 42.2 44.2 45.2 40.6 40.6  --  44.0   PLATELETS 10*3/mm3 300 293 292 285 307  --  367     Results from last 7 days   Lab Units 12/13/19  0619 12/12/19  0502 12/11/19  0534  12/08/19  0342   SODIUM mmol/L 139 138 143   < > 142   POTASSIUM mmol/L 4.1 4.0 4.8   < > 4.8   CHLORIDE mmol/L 105 100 101   < > 97*   CO2 mmol/L 27.5 28.1 30.5*   < > 27.3   BUN mg/dL 18 17 21   < > 9   CREATININE mg/dL 0.28* 0.30* 0.36*   < > 0.51*   CALCIUM mg/dL 8.8 9.3 9.3   < > 9.7   BILIRUBIN mg/dL  --   --   --   --  0.4   ALK PHOS U/L  --   --   --   --  106   ALT (SGPT) U/L  --   --   --   --  24   AST (SGOT) U/L  --   --   --   --  28   GLUCOSE mg/dL 160* 122* 178*   < > 117*    < > = values in this interval not displayed.     Anticoagulation history: home med warfarin 6mg MWF and 3mg Tu/Th/Sa/Su per patient    Hospital Anticoagulation:  Consulting provider: Dr Montoya  Start date: home med  Indication: A  Fib  Target INR: 2-3  Expected duration: lifelong                    Date 12/8 12/9 12/10 12/11 12/12 12/13       INR 2.10 2.76 2.30 2.25 2.41 2.69       Warfarin dose 3mg 1 mg 2 mg 3 mg 3 mg          Potential drug interactions:   Zithromax - increased INR (done)  Aspirin + APAP - increased risk of bleed (home med)  Lexapro - increased risk of bleed (home med)  Requip - increased INR (home med)  Methimazole - decreased warfarin effectiveness (home med)  Solu-Medrol - increased risk of bleed    Education complete?/ Date: Home med    Assessment/Plan:  INR is 2.69 today. Azithromycin has been completed, post antibiotic effect will remain for a few days, may require lower warfarin dose.  Will give 1 mg today and recheck in the am.     Dose  1 mg today, reassess daily  Monitor daily INR  Follow up tomorrow    Pharmacy will continue to follow until discharge or discontinuation of warfarin.   Angelito Frias, [facility]  12/13/2019

## 2019-12-13 NOTE — PROGRESS NOTES
Continued Stay Note  [facility]     Patient Name: Leonor Bhandari  MRN: [id number]  Today's Date: 12/13/2019    Admit Date: 12/8/2019    Discharge Plan     Row Name 12/13/19 0845       Plan    Plan  Undecided    Plan Comments  CCP following pt for needs         Discharge Codes    No documentation.             Socorro Nelson RN

## 2019-12-13 NOTE — H&P
[facility]  Pulmonology History and Physical  Patient Identification:  Name: Leonor Bhandari ADMIT: 2019   : 1955  DIET: Diet Regular; Cardiac    MRN: [id number] LOS:  LOS: 5 days    Sex: female  ROOM: [facility]   Age: 64 y.o.       CC: Acute on Chronic Respiratory Failure with Hypoxemia and Hypercapnia  Transferring from Critical Care Pulmonary to Hospital Managed Pulmonary     History of Present Illness:   This is a 64 y.o. female with a history of Asthma, COPD, Chronic Respiratory Failure with hypoxemia and hypercapnia on home NIV, History of DARLENE, WILSON, and Afib with frequent bouts of RVR and associated hospitalizations. She acquired viral infection of the respiratory tract resulting in acute respiratory failure with acute bronchitis and discolored mucus secretions. She came in by EMS, treated early in the ED with high flow oxygen then BiPAP support. She has improved respiratory condition and is now on her home dose of Oxygen at 2 liters flow at rest. She is to be transferred out of the unit today. Care as attending is being transferred to myself.     Past Medical History:  Past Medical History:   Diagnosis Date   • Anxiety and depression    • Asthma    • Atrial fibrillation (CMS/HCC)    • Chronic fatigue    • COPD (chronic obstructive pulmonary disease) (CMS/HCC)    • Graves disease    • HTN (hypertension)    • Hyperlipidemia    • Hyperthyroidism    • Post-menopausal osteoporosis    • Sleep apnea      Past Surgical History:  Past Surgical History:   Procedure Laterality Date   • APPENDECTOMY     •  SECTION     • TONSILLECTOMY AND ADENOIDECTOMY        Home Meds:  Medications Prior to Admission   Medication Sig Dispense Refill Last Dose   • aspirin (aspirin EC) 81 MG EC tablet Take 81 mg by mouth.   2019 at Unknown time   • digoxin (LANOXIN) 250 MCG tablet Take 250 mcg by mouth Daily.      • dilTIAZem (CARDIZEM) 90 MG tablet Take 90 mg by mouth 4 (Four) Times a Day.      •  diphenhydrAMINE (BENADRYL) 25 mg capsule Take 25 mg by mouth At Night As Needed for Sleep.      • escitalopram (LEXAPRO) 20 MG tablet Take 20 mg by mouth Daily.      • guaiFENesin (MUCINEX) 600 MG 12 hr tablet Take 600 mg by mouth 2 (Two) Times a Day As Needed for Cough or Congestion.   2/19/2019 at Unknown time   • ipratropium-albuterol (DUO-NEB) 0.5-2.5 mg/mL nebulizer Inhale 3 mL Every 4 (Four) Hours.   2/20/2019 at Unknown time   • methIMAzole (TAPAZOLE) 10 MG tablet Take 5 mg by mouth 2 (Two) Times a Day.      • predniSONE (DELTASONE) 5 MG tablet Take 5 mg by mouth 2 (Two) Times a Day.   Taking   • rOPINIRole (REQUIP) 1 MG tablet Take 2 mg by mouth Every Night. Take 1 hour before bedtime.       • VENTOLIN  (90 BASE) MCG/ACT inhaler Inhale 2 puffs Every 4 (Four) Hours As Needed for Wheezing or Shortness of Air.   2/19/2019 at Unknown time   • warfarin (COUMADIN) 6 MG tablet Take 6 mg by mouth Take As Directed. 6mg MWF and 3mg Tu/th/Sa/Ramos        Current Meds: [id number]  [place], [place] [id number] [address] [place] [address]    aspirin 81 mg Oral Daily   digoxin 250 mcg Oral Daily   escitalopram 20 mg Oral Daily   guaiFENesin 1,200 mg Oral Q12H   insulin lispro 0-9 Units Subcutaneous 4x Daily With Meals & Nightly   ipratropium-albuterol 3 mL Nebulization Q6H - RT   methIMAzole 5 mg Oral Daily With Breakfast   methylPREDNISolone sodium succinate 40 mg Intravenous Q8H   metoprolol succinate XL 25 mg Oral Q12H   rOPINIRole 1 mg Oral Nightly     Allergies:  Allergies   Allergen Reactions   • Ramipril Hives     Social History:   Social History     Tobacco Use   • Smoking status: Current Some Day Smoker     Packs/day: 0.25     Years: 15.00     Pack years: 3.75     Types: Cigarettes     Start date: 12/8/1990   • Smokeless tobacco: Never Used   Substance Use Topics   • Alcohol use: No      Family History:  History reviewed. No pertinent family history.     Review of Systems:  She  felt ill for a few days PTA and admits to exposure to multiple family members that are acutely ill at home. She was nauseated earlier but has improved. She ate everything on her plate this am. She was capable of getting out of bed yesterday to the chair. Her last reported [facility] this November showed average use 5 hours consistent with compliance. Yesterday she had to be placed on IV Diltiazem due to afib and RVR. She is usually on Diltiazem 90 mg QID, BBlocker, and Digoxin. She controls her Asthma with DuoNebs 5-6 treatments per day.   The remainder of the 10 point ROS is thought to be unremarkable or noncontributory.    Vitals:   73.4 kg (161 lb 13.1 oz)                BMI: Body mass index is 29.6 kg/m².  Temp (24hrs), Av.2 °F (36.8 °C), Min:97.7 °F (36.5 °C), Max:98.9 °F (37.2 °C)    Vitals:    19 0800   BP: 134/72   Pulse: 90   Resp:    Temp:    SpO2: (!) 86%     SpO2 Percentage    19 0600 19 0649 19 0800   SpO2: 92% 92% (!) 86%     Intake/Output:     Intake/Output Summary (Last 24 hours) at 2019 0832  Last data filed at 2019 0600  Gross per 24 hour   Intake 1805.58 ml   Output 3700 ml   Net -1894.42 ml       Exam:  General appearance: alert, cooperative and no distress, O2 sats 93 % my exam on 2 liters  Head: Normocephalic, without obvious abnormality, atraumatic  Eyes: PERRL, EOM intact, sclera non icteric  Ears:  No drainage  Nose: Nares normal. Septum midline. Mucosa normal. No drainage or sinus tenderness., no discharge  Throat:normal mucosa, no thrush or exudates  Neck: no adenopathy, no carotid bruit, no JVD and supple, symmetrical, trachea midline  Back: no kyphosis present, no scoliosis present, no tenderness to percussion or palpation  Lungs: clear to auscultation bilaterally  Heart: IRR, IRR, S1, no murmur  Abdomen: soft, non-tender; bowel sounds normal; no masses,  no organomegaly  Extremities: extremities normal, atraumatic, no cyanosis or  edema, SCD's are in use  Pulses: 2+ and symmetric  Skin: Skin color, texture, turgor normal. No rashes or lesions  Lymph nodes: No cervical adenopathy  Neurologic: Grossly normal      Objective:  Radiology and Labs:   Recent Results (from the past 24 hour(s))   POC Glucose Once    Collection Time: 12/12/19 11:18 AM   Result Value Ref Range    Glucose 179 (H) 70 - 130 mg/dL   POC Glucose Once    Collection Time: 12/12/19  5:23 PM   Result Value Ref Range    Glucose 122 70 - 130 mg/dL   POC Glucose Once    Collection Time: 12/12/19  8:51 PM   Result Value Ref Range    Glucose 153 (H) 70 - 130 mg/dL   Protime-INR    Collection Time: 12/13/19  6:19 AM   Result Value Ref Range    Protime 28.3 (H) 11.7 - 14.2 Seconds    INR 2.69 (H) 0.90 - 1.10   Renal Function Panel    Collection Time: 12/13/19  6:19 AM   Result Value Ref Range    Glucose 160 (H) 65 - 99 mg/dL    BUN 18 8 - 23 mg/dL    Creatinine 0.28 (L) 0.57 - 1.00 mg/dL    Sodium 139 136 - 145 mmol/L    Potassium 4.1 3.5 - 5.2 mmol/L    Chloride 105 98 - 107 mmol/L    CO2 27.5 22.0 - 29.0 mmol/L    Calcium 8.8 8.6 - 10.5 mg/dL    Albumin 3.20 (L) 3.50 - 5.20 g/dL    Phosphorus 3.1 2.5 - 4.5 mg/dL    Anion Gap 6.5 5.0 - 15.0 mmol/L    BUN/Creatinine Ratio 64.3 (H) 7.0 - 25.0    eGFR Non African Amer >150 >60 mL/min/1.73   CBC Auto Differential    Collection Time: 12/13/19  6:19 AM   Result Value Ref Range    WBC 9.76 3.40 - 10.80 10*3/mm3    RBC 5.39 (H) 3.77 - 5.28 10*6/mm3    Hemoglobin 13.7 12.0 - 15.9 g/dL    Hematocrit 42.2 34.0 - 46.6 %    MCV 78.3 (L) 79.0 - 97.0 fL    MCH 25.4 (L) 26.6 - 33.0 pg    MCHC 32.5 31.5 - 35.7 g/dL    RDW 14.2 12.3 - 15.4 %    RDW-SD 39.3 37.0 - 54.0 fl    MPV 10.3 6.0 - 12.0 fL    Platelets 300 140 - 450 10*3/mm3    Neutrophil % 87.1 (H) 42.7 - 76.0 %    Lymphocyte % 5.8 (L) 19.6 - 45.3 %    Monocyte % 6.3 5.0 - 12.0 %    Eosinophil % 0.0 (L) 0.3 - 6.2 %    Basophil % 0.1 0.0 - 1.5 %    Immature Grans % 0.7 (H) 0.0 - 0.5 %     Neutrophils, Absolute 8.50 (H) 1.70 - 7.00 10*3/mm3    Lymphocytes, Absolute 0.57 (L) 0.70 - 3.10 10*3/mm3    Monocytes, Absolute 0.61 0.10 - 0.90 10*3/mm3    Eosinophils, Absolute 0.00 0.00 - 0.40 10*3/mm3    Basophils, Absolute 0.01 0.00 - 0.20 10*3/mm3    Immature Grans, Absolute 0.07 (H) 0.00 - 0.05 10*3/mm3    nRBC 0.0 0.0 - 0.2 /100 WBC       Assessment:   Acute on Chronic Respiratory Failure with Hypoxemia and history of hypercapnia with prior pCO2's in the 50's and 60's  Human Rhinovirus Respiratory Tract Infection  Acute Bronchitis  Asthma/COPD with AE (home O2 and steroid dependent)  Persistent Afib with RVR on anticoagulation, Chronic Diastolic Heart Failure, Mild AS  Hyperthyroidism  DARLENE  Anxiety/Depression  Restless Leg    Plan:   Taking over attending-Transfer of service  Cardizem drip transitioning to oral, Cardiology following, appreciate assistance  Zithromycin, Mucinex, DuoNeb, Solumedrol  Coumadin  Methimazole  Insulin for steroid induced hyperglycemia    Chucho Cunningham MD  12/13/2019

## 2019-12-14 NOTE — PROGRESS NOTES
"Patient Name: Leonor Bhandari  :1955  64 y.o.      Patient Care Team:  Reyes, Miriam Mercado, MD as PCP - General (Family Medicine)  Reyes, Miriam Mercado, MD as PCP - Claims Attributed    Interval History:   Heart rate borderline tachycardic.  Remains in atrial fibrillation.    Subjective:  Following for atrial fibrillation.    Objective   Vital Signs  Temp:  [97.1 °F (36.2 °C)-98.9 °F (37.2 °C)] 97.6 °F (36.4 °C)  Heart Rate:  [] 99  Resp:  [20-24] 20  BP: (124-166)/(72-96) 124/95    Intake/Output Summary (Last 24 hours) at 2019 1225  Last data filed at 2019 1100  Gross per 24 hour   Intake 840 ml   Output 3400 ml   Net -2560 ml     Flowsheet Rows      First Filed Value   Admission Height  162.6 cm (64\") Documented at 2019 0312   Admission Weight  79.9 kg (176 lb 3.2 oz) Documented at 2019 0355          Physical Exam:   General Appearance:    Alert, cooperative, in no acute distress   Lungs:     Clear to auscultation.  Normal respiratory effort and rate.      Heart:   Ryan irregular and tachycardic.   Chest Wall:    No abnormalities observed   Abdomen:     Soft, nontender, positive bowel sounds.     Extremities:   no cyanosis, clubbing or edema.  No marked joint deformities.  Adequate musculoskeletal strength.       Results Review:    Results from last 7 days   Lab Units 19  0344   SODIUM mmol/L 142   POTASSIUM mmol/L 4.3   CHLORIDE mmol/L 103   CO2 mmol/L 28.6   BUN mg/dL 17   CREATININE mg/dL 0.32*   GLUCOSE mg/dL 149*   CALCIUM mg/dL 8.5*     Results from last 7 days   Lab Units 19  0342   TROPONIN T ng/mL <0.010     Results from last 7 days   Lab Units 19  0344   WBC 10*3/mm3 12.61*   HEMOGLOBIN g/dL 14.3   HEMATOCRIT % 43.5   PLATELETS 10*3/mm3 316     Results from last 7 days   Lab Units 19  0344 19  0619 19  0502   INR  1.97* 2.69* 2.41*                     Medication Review:     aspirin 81 mg Oral Daily   digoxin 250 mcg Oral Daily "   dilTIAZem 30 mg Oral Q6H   escitalopram 20 mg Oral Daily   guaiFENesin 1,200 mg Oral Q12H   insulin lispro 0-9 Units Subcutaneous 4x Daily With Meals & Nightly   ipratropium-albuterol 3 mL Nebulization Q6H - RT   methIMAzole 5 mg Oral BID   methylPREDNISolone sodium succinate 40 mg Intravenous Q12H   metoprolol succinate XL 25 mg Oral Q12H   rOPINIRole 1 mg Oral Nightly   warfarin 3 mg Oral Once per day on Sun Tue Thu Sat   [START ON 12/16/2019] warfarin 6 mg Oral Once per day on Mon Wed Fri          Pharmacy to dose warfarin        Assessment/Plan     1.  Paroxysmal atrial fibrillation with rapid ventricular response.  Currently on oral digoxin, oral short acting diltiazem, Toprol-XL 25 twice daily.  Off diltiazem drip.  On oral warfarin.  Pharmacy dosing warfarin.  2.  Acute on chronic hypoxic hypercapnic respiratory failure/COPD exacerbation/rhinovirus upper respiratory tract infection.  3.  Chronic diastolic heart failure  4.  Mild aortic stenosis  5.  Tobacco use  6.  Hypothyroid.  On methimazole.    -Continue digoxin and Toprol-XL.  -Transition to long-acting diltiazem.  Increase the dose for better rate control.  -Call Dr. Jaramillo with [facility] for a cardiology follow-up appointment.  He is her usual cardiologist.  -She is anxious to go home.  I think it is okay from a cardiac standpoint.  I told her it depends upon her pulmonary status.    Niesha Rosen MD, [facility]  12/14/19  12:25 PM

## 2019-12-14 NOTE — PROGRESS NOTES
"DAILY PROGRESS NOTE    Patient Identification:               Leonor Bhandari  LOS: 6 days    64 y.o. 157.5 cm (62\")    female  73.4 kg (161 lb 13.1 oz)   1955 Diet Regular; Cardiac   [id number]     ADMIT DATE: 12/8/2019       CC:   Chief Complaint   Patient presents with   • Shortness of Breath   Acute on Chronic Respiratory Failure with Hypoxemia and Hypercapnia       History of Present Illness: From pulmonary consult: This is a 64 y.o. female with a history of Asthma, COPD, Chronic Respiratory Failure with hypoxemia and hypercapnia on home NIV, History of DARLENE, WILSON, and Afib with frequent bouts of RVR and associated hospitalizations. She acquired viral infection of the respiratory tract resulting in acute respiratory failure with acute bronchitis and discolored mucus secretions. She came in by EMS, treated early in the ED with high flow oxygen then BiPAP support. She has improved respiratory condition and is now on her home dose of Oxygen at 2 liters flow at rest. She is to be transferred out of the unit today. Care as attending is being transferred to myself.        PFSH:   Past Medical History:   Diagnosis Date   • Anxiety and depression    • Asthma    • Atrial fibrillation (CMS/HCC)    • Chronic fatigue    • COPD (chronic obstructive pulmonary disease) (CMS/HCC)    • Graves disease    • HTN (hypertension)    • Hyperlipidemia    • Hyperthyroidism    • Post-menopausal osteoporosis    • Sleep apnea        Allergies   Allergen Reactions   • Ramipril Hives         aspirin 81 mg Oral Daily   digoxin 250 mcg Oral Daily   dilTIAZem  mg Oral Q24H   escitalopram 20 mg Oral Daily   guaiFENesin 1,200 mg Oral Q12H   insulin lispro 0-9 Units Subcutaneous 4x Daily With Meals & Nightly   ipratropium-albuterol 3 mL Nebulization Q6H - RT   methIMAzole 5 mg Oral BID   methylPREDNISolone sodium succinate 40 mg Intravenous Q12H   metoprolol succinate XL 25 mg Oral Q12H   rOPINIRole 1 mg Oral Nightly   warfarin 3 mg Oral " Once per day on Sun Tue Thu Sat   [START ON 12/16/2019] warfarin 6 mg Oral Once per day on Mon Wed Fri       Review of Systems: Asking to go home, on 2-1/2 L, O2 sats 97%, used insulin today.  Pharmacy monitoring PT/INR and adjusting Coumadin.  Completed Zithromax    Intake/Output:     Intake/Output Summary (Last 24 hours) at 12/14/2019 1836  Last data filed at 12/14/2019 1758  Gross per 24 hour   Intake 720 ml   Output 3500 ml   Net -2780 ml     Temp:  [97.1 °F (36.2 °C)-98.4 °F (36.9 °C)] 98.4 °F (36.9 °C)  Heart Rate:  [] 105  Resp:  [20-24] 22  BP: (124-166)/(74-96) 144/74  O2 SATS:  SpO2 Readings from Last 3 Encounters:   12/14/19 96%   02/23/19 97%   02/27/17 94%       EXAM  General: No Distress, displaying anxiousness to go home, O2 2 and half liters O2 sats 98% by my exam  HEENT: No Drainage from the ears or nostrils  Neck:  No Stridor or Lymphadenopathy  Lungs: CTA  CV: RRR  Abdomen: Soft + BS  Extremities: No CCE  Neurologic: Oriented x 4    DATA:  Recent Results (from the past 24 hour(s))   POC Glucose Once    Collection Time: 12/13/19  8:55 PM   Result Value Ref Range    Glucose 161 (H) 70 - 130 mg/dL   Comprehensive Metabolic Panel    Collection Time: 12/14/19  3:44 AM   Result Value Ref Range    Glucose 149 (H) 65 - 99 mg/dL    BUN 17 8 - 23 mg/dL    Creatinine 0.32 (L) 0.57 - 1.00 mg/dL    Sodium 142 136 - 145 mmol/L    Potassium 4.3 3.5 - 5.2 mmol/L    Chloride 103 98 - 107 mmol/L    CO2 28.6 22.0 - 29.0 mmol/L    Calcium 8.5 (L) 8.6 - 10.5 mg/dL    Total Protein 6.0 6.0 - 8.5 g/dL    Albumin 3.20 (L) 3.50 - 5.20 g/dL    ALT (SGPT) 18 1 - 33 U/L    AST (SGOT) 14 1 - 32 U/L    Alkaline Phosphatase 56 39 - 117 U/L    Total Bilirubin 0.3 0.2 - 1.2 mg/dL    eGFR Non African Amer >150 >60 mL/min/1.73    Globulin 2.8 gm/dL    A/G Ratio 1.1 g/dL    BUN/Creatinine Ratio 53.1 (H) 7.0 - 25.0    Anion Gap 10.4 5.0 - 15.0 mmol/L   Protime-INR    Collection Time: 12/14/19  3:44 AM   Result Value Ref Range     Protime 22.1 (H) 11.7 - 14.2 Seconds    INR 1.97 (H) 0.90 - 1.10   CBC Auto Differential    Collection Time: 12/14/19  3:44 AM   Result Value Ref Range    WBC 12.61 (H) 3.40 - 10.80 10*3/mm3    RBC 5.48 (H) 3.77 - 5.28 10*6/mm3    Hemoglobin 14.3 12.0 - 15.9 g/dL    Hematocrit 43.5 34.0 - 46.6 %    MCV 79.4 79.0 - 97.0 fL    MCH 26.1 (L) 26.6 - 33.0 pg    MCHC 32.9 31.5 - 35.7 g/dL    RDW 13.9 12.3 - 15.4 %    RDW-SD 39.7 37.0 - 54.0 fl    MPV 10.2 6.0 - 12.0 fL    Platelets 316 140 - 450 10*3/mm3    Neutrophil % 87.1 (H) 42.7 - 76.0 %    Lymphocyte % 6.1 (L) 19.6 - 45.3 %    Monocyte % 6.0 5.0 - 12.0 %    Eosinophil % 0.0 (L) 0.3 - 6.2 %    Basophil % 0.2 0.0 - 1.5 %    Immature Grans % 0.6 (H) 0.0 - 0.5 %    Neutrophils, Absolute 10.99 (H) 1.70 - 7.00 10*3/mm3    Lymphocytes, Absolute 0.77 0.70 - 3.10 10*3/mm3    Monocytes, Absolute 0.76 0.10 - 0.90 10*3/mm3    Eosinophils, Absolute 0.00 0.00 - 0.40 10*3/mm3    Basophils, Absolute 0.02 0.00 - 0.20 10*3/mm3    Immature Grans, Absolute 0.07 (H) 0.00 - 0.05 10*3/mm3    nRBC 0.0 0.0 - 0.2 /100 WBC   Phosphorus    Collection Time: 12/14/19  3:44 AM   Result Value Ref Range    Phosphorus 3.1 2.5 - 4.5 mg/dL   POC Glucose Once    Collection Time: 12/14/19  7:00 AM   Result Value Ref Range    Glucose 121 70 - 130 mg/dL   POC Glucose Once    Collection Time: 12/14/19 11:48 AM   Result Value Ref Range    Glucose 180 (H) 70 - 130 mg/dL   POC Glucose Once    Collection Time: 12/14/19  4:54 PM   Result Value Ref Range    Glucose 207 (H) 70 - 130 mg/dL       Assessment:  Acute on Chronic Respiratory Failure with Hypoxemia and history of hypercapnia with prior pCO2's in the 50's and 60's  Human Rhinovirus Respiratory Tract Infection  Acute Bronchitis  Asthma/COPD with AE (home O2 and steroid dependent)  Persistent Afib with RVR on anticoagulation, Chronic Diastolic Heart Failure, Mild AS  Hyperthyroidism  DARLENE  Anxiety/Depression  Restless Leg    Plan:  Cardizem now oral with  long-acting dose at 300 mg daily, Zithromax completed, continue Solu-Medrol wean with plans to change over to prednisone after a.m. dose.  Likely home tomorrow if remains stable.  Will check ambulatory O2 sats tomorrow to see if she can maintain with her routine 2 L.    Chucho Cunningham MD  12/14/2019  6:36 PM

## 2019-12-14 NOTE — PLAN OF CARE
Problem: Patient Care Overview  Goal: Plan of Care Review  Outcome: Ongoing (interventions implemented as appropriate)  Flowsheets  Taken 12/14/2019 0412  Progress: improving  Outcome Summary: VSS.  No c/o pain.  HR controlled 80-110bpm with PO cardizem, digoxin and metoprolol. She wears 2L NC at home and is currently tolerating 2L now.  Continues with antibiotics, steroids and breathing treatments.  Xanax given to help sleep.  Will continue to monitor.  Taken 12/14/2019 0205  Plan of Care Reviewed With: patient  Goal: Individualization and Mutuality  Outcome: Ongoing (interventions implemented as appropriate)  Goal: Discharge Needs Assessment  Outcome: Ongoing (interventions implemented as appropriate)  Goal: Interprofessional Rounds/Family Conf  Outcome: Ongoing (interventions implemented as appropriate)     Problem: Fall Risk (Adult)  Goal: Identify Related Risk Factors and Signs and Symptoms  Outcome: Ongoing (interventions implemented as appropriate)  Goal: Absence of Fall  Outcome: Ongoing (interventions implemented as appropriate)     Problem: Skin Injury Risk (Adult)  Goal: Identify Related Risk Factors and Signs and Symptoms  Outcome: Ongoing (interventions implemented as appropriate)  Goal: Skin Health and Integrity  Outcome: Ongoing (interventions implemented as appropriate)     Problem: Chronic Obstructive Pulmonary Disease (Adult)  Goal: Signs and Symptoms of Listed Potential Problems Will be Absent, Minimized or Managed (Chronic Obstructive Pulmonary Disease)  Outcome: Ongoing (interventions implemented as appropriate)     Problem: NPPV/CPAP (Adult)  Goal: Signs and Symptoms of Listed Potential Problems Will be Absent, Minimized or Managed (NPPV/CPAP)  Outcome: Ongoing (interventions implemented as appropriate)

## 2019-12-14 NOTE — PROGRESS NOTES
Pharmacy Consult: Warfarin Dosing/ Monitoring    Leonor Bhandari is a 64 y.o. female, estimated creatinine clearance is 170.9 mL/min (A) (by C-G formula based on SCr of 0.32 mg/dL (L)). weighing 73.4 kg (161 lb 13.1 oz).     has a past medical history of Anxiety and depression, Asthma, Atrial fibrillation (CMS/HCC), Chronic fatigue, COPD (chronic obstructive pulmonary disease) (CMS/HCC), Graves disease, HTN (hypertension), Hyperlipidemia, Hyperthyroidism, Post-menopausal osteoporosis, and Sleep apnea.    Social History     Tobacco Use    Smoking status: Current Some Day Smoker     Packs/day: 0.25     Years: 15.00     Pack years: 3.75     Types: Cigarettes     Start date: 12/8/1990    Smokeless tobacco: Never Used   Substance Use Topics    Alcohol use: No    Drug use: Never       Results from last 7 days   Lab Units 12/14/19  0344 12/13/19  0619 12/12/19  0502 12/11/19  0534 12/10/19  0236 12/09/19  0905 12/09/19  0232 12/08/19  0342   INR  1.97* 2.69* 2.41* 2.25* 2.30*  --  2.76* 2.10*   HEMOGLOBIN g/dL 14.3 13.7 14.1 13.9 12.9 12.9  --  14.6   HEMATOCRIT % 43.5 42.2 44.2 45.2 40.6 40.6  --  44.0   PLATELETS 10*3/mm3 316 300 293 292 285 307  --  367     Results from last 7 days   Lab Units 12/14/19  0344 12/13/19  0619 12/12/19  0502  12/08/19  0342   SODIUM mmol/L 142 139 138   < > 142   POTASSIUM mmol/L 4.3 4.1 4.0   < > 4.8   CHLORIDE mmol/L 103 105 100   < > 97*   CO2 mmol/L 28.6 27.5 28.1   < > 27.3   BUN mg/dL 17 18 17   < > 9   CREATININE mg/dL 0.32* 0.28* 0.30*   < > 0.51*   CALCIUM mg/dL 8.5* 8.8 9.3   < > 9.7   BILIRUBIN mg/dL 0.3  --   --   --  0.4   ALK PHOS U/L 56  --   --   --  106   ALT (SGPT) U/L 18  --   --   --  24   AST (SGOT) U/L 14  --   --   --  28   GLUCOSE mg/dL 149* 160* 122*   < > 117*    < > = values in this interval not displayed.     Anticoagulation history: home med warfarin 6mg MWF and 3mg Tu/Th/Sa/Su per patient    Hospital Anticoagulation:  Consulting provider: Dr Montoya  Start date:  home med  Indication: A Fib  Target INR: 2-3  Expected duration: lifelong                    Date 12/8 12/9 12/10 12/11 12/12 12/13 12/14      INR 2.10 2.76 2.30 2.25 2.41 2.69 1.97      Warfarin dose 3mg 1 mg 2 mg 3 mg 3 mg 1mg         Potential drug interactions:   Zithromax - increased INR (last dose 12/12)  Aspirin + APAP - increased risk of bleed (home med)  Lexapro - increased risk of bleed (home med)  Requip - increased INR (home med)  Methimazole - decreased warfarin effectiveness (home med)  Solu-Medrol - increased risk of bleed    Education complete?/ Date: Home med    Assessment/Plan:  INR dropped significantly today. Note pt is now off azithromycin-- last dose on 12/12.  Will resume warfarin at home dose 3mg on TTSS, and 6mg on MWF. Continue to monitor daily INR for now and will adjust as needed.    Pharmacy will continue to follow until discharge or discontinuation of warfarin.   Trina Johnson RPH  12/14/2019

## 2019-12-15 PROBLEM — J96.22 ACUTE ON CHRONIC RESPIRATORY FAILURE WITH HYPOXIA AND HYPERCAPNIA (HCC): Status: ACTIVE | Noted: 2019-01-01

## 2019-12-15 PROBLEM — I48.0 PAROXYSMAL ATRIAL FIBRILLATION WITH RAPID VENTRICULAR RESPONSE (HCC): Chronic | Status: ACTIVE | Noted: 2019-01-01

## 2019-12-15 PROBLEM — J96.21 ACUTE ON CHRONIC RESPIRATORY FAILURE WITH HYPOXIA AND HYPERCAPNIA (HCC): Status: ACTIVE | Noted: 2019-01-01

## 2019-12-15 NOTE — DISCHARGE SUMMARY
[facility]  Discharge Summary  Name: Leonor Bhandari  Age: 64 y.o.  Sex: female  :  1955  MRN: [id number]         Primary Care Physician: Reyes, Miriam Mercado, MD      Date of Admission:  2019  Date of Discharge:  12/15/2019      CHIEF COMPLAINT  Shortness of Breath      DISCHARGE DIAGNOSIS  Active Hospital Problems    Diagnosis  POA   • **Acute on chronic respiratory failure with hypoxia and hypercapnia (CMS/[facility]) [J96.21, J96.22]  Yes   • Paroxysmal atrial fibrillation with rapid ventricular response (CMS/HCC) [I48.0]  Unknown      Resolved Hospital Problems   No resolved problems to display.       SECONDARY DIAGNOSES  Past Medical History:   Diagnosis Date   • Anxiety and depression    • Asthma    • Atrial fibrillation (CMS/[facility])    • Chronic fatigue    • COPD (chronic obstructive pulmonary disease) (CMS/[facility])    • Graves disease    • HTN (hypertension)    • Hyperlipidemia    • Hyperthyroidism    • Post-menopausal osteoporosis    • Sleep apnea      ADDITIONAL DIAGNOSES  · Human Rhinovirus Respiratory Infection  · Asthma/COPD with Acute Exacerbation  · Acute Bronchitis  · Mild AS, History of Diastolic Heart Failure      CONSULTS   None  Consult Orders (all) (From admission, onward)     Start     Ordered    19 1012  Inpatient Cardiology Consult  Once     Specialty:  Cardiology  Provider:  Max Levin III, MD    19 1012    19 0500  Pulmonology (on-call MD unless specified)  Once     Specialty:  Pulmonary Disease  Provider:  Rufina Montoya MD    19 0459              Consulting Physician(s)     Provider Relationship Specialty    Max Levin III, MD Consulting Physician Cardiology    Rufina Montoya MD Consulting Physician Pulmonary Disease        Admitting ICU pulmonologist Intensivist: Rufina Montoya MD  Attending pulmonologist: Chucho Cunningham MD    PROCEDURES PERFORMED  None        HOSPITAL COURSE  From pulmonary consult: This is a 64 y.o. female with a  history of Asthma, COPD, Chronic Respiratory Failure with hypoxemia and hypercapnia on home NIV, History of DARLENE, WILSON, and Afib with frequent bouts of RVR and associated hospitalizations. She acquired viral infection of the respiratory tract resulting in acute respiratory failure with acute bronchitis and discolored mucus secretions. She came in by EMS, treated early in the ED with high flow oxygen then BiPAP support. She has improved respiratory condition and is now on her home dose of Oxygen at 2 liters flow at rest. She is to be transferred out of the unit today. Care as attending is being transferred to myself.       Patient was managed in the ICU from 12/8 to 12/12 for acute on chronic respiratory failure with hypoxemia and hypercapnia followed by Intensivist. She did require cardiology consultation at the end of her stay in the ICU and stayed in ICU as an overflow patient to be placed on cardizem drip. She was diagnosed with human rhinovirus which explained her decompensation. Afib with RVR is a chronic problem felt to be aggravated due to pulmonary exacerbation.   After transfer out of the unit she was weaned off Solumedrol with plans to taper prednisone back to her maintenance dose of 10 mg daily. She had her Cardizem changed back over to oral. She ambulated on her usual 2 liters O2 which was adequate at maintaining O2 sats but with ambulation if was documented that she would require O2 at 4 liters for sustained ambulation.   She is stable for release to home where she has oxygen supplies, Astral NIV, and respiratory medications.       PHYSICAL EXAM  Temp:  [97.4 °F (36.3 °C)-98.4 °F (36.9 °C)] 97.9 °F (36.6 °C)  Heart Rate:  [] 99  Resp:  [18-24] 22  BP: (109-147)/(69-85) 129/69  Body mass index is 29.6 kg/m².  Physical Exam  General: RA O2 sats 94 % on 2 liters while at rest  HEENT: No Drainage from the ears or nostrils  Neck:  No Stridor or Lymphadenopathy  Lungs: CTA  CV: RRR  Abdomen: Soft +  BS  Extremities: No CCE  Neurologic: Oriented x 4      CONDITION ON DISCHARGE  Stable and Improved, back on baseline O2 2 liters but requiring increased O2 up to 4 liters with activity      DISCHARGE DISPOSITION   Home with family      ALLERGIES  Allergies   Allergen Reactions   • Ramipril Hives         DISCHARGE MEDICATIONS     Your medication list      ASK your doctor about these medications      Instructions Last Dose Given Next Dose Due   aspirin EC 81 MG EC tablet  Generic drug:  aspirin      Take 81 mg by mouth.       digoxin 250 MCG tablet  Commonly known as:  LANOXIN      Take 250 mcg by mouth Daily.       dilTIAZem 90 MG tablet  Commonly known as:  CARDIZEM      Take 90 mg by mouth 4 (Four) Times a Day.       diphenhydrAMINE 25 mg capsule  Commonly known as:  BENADRYL      Take 25 mg by mouth At Night As Needed for Sleep.       escitalopram 20 MG tablet  Commonly known as:  LEXAPRO  Ask about: Which instructions should I use?      Take 20 mg by mouth Daily.       guaiFENesin 600 MG 12 hr tablet  Commonly known as:  MUCINEX      Take 600 mg by mouth 2 (Two) Times a Day As Needed for Cough or Congestion.       ipratropium-albuterol 0.5-2.5 mg/3 ml nebulizer  Commonly known as:  DUO-NEB      Inhale 3 mL Every 4 (Four) Hours.       methIMAzole 10 MG tablet  Commonly known as:  TAPAZOLE  Ask about: Which instructions should I use?      Take 5 mg by mouth 2 (Two) Times a Day.       predniSONE 5 MG tablet  Commonly known as:  DELTASONE      Take 5 mg by mouth 2 (Two) Times a Day.       rOPINIRole 1 MG tablet  Commonly known as:  REQUIP      Take 2 mg by mouth Every Night. Take 1 hour before bedtime.       VENTOLIN  (90 Base) MCG/ACT inhaler  Generic drug:  albuterol sulfate HFA      Inhale 2 puffs Every 4 (Four) Hours As Needed for Wheezing or Shortness of Air.       warfarin 6 MG tablet  Commonly known as:  COUMADIN  Ask about: Which instructions should I use?      Take 6 mg by mouth Take As Directed. 6mg  MWF and 3mg Tu/th/Sa/Ramos             No future appointments.    TEST  RESULTS PENDING AT DISCHARGE  None (D/C ABG request-12/10 not required)     CODE STATUS  Code Status and Medical Interventions:   Ordered at: 12/08/19 0632     Code Status:    CPR     Medical Interventions (Level of Support Prior to Arrest):    Full     Follow up with MD Marisa within 1-3 weeks  Follow up with her Primary Cardiologist Dr. Torres with [facility] for continued Afib management and monitoring PT/INR      Chucho Cunningham MD    12/15/19  11:39 AM      Time: greater than 30 minutes.

## 2019-12-15 NOTE — PLAN OF CARE
Problem: Patient Care Overview  Goal: Plan of Care Review  Outcome: Ongoing (interventions implemented as appropriate)  Flowsheets (Taken 12/15/2019 1340)  Progress: improving  Plan of Care Reviewed With: patient  Outcome Summary: vss, plan to be discharged to day on 2 liters at rest (home dose) and 4l o2 with activity, changed over to oral steroids, pt requesting nebilizer equipment,ccp consulted, [facility] notified, hospital tubing neb equipment sent home with patient, pt to have INR checked at clinic on this wk, pt stated she will go on Tuesday, continue to monitor

## 2019-12-15 NOTE — PLAN OF CARE
Problem: Patient Care Overview  Goal: Plan of Care Review  Outcome: Ongoing (interventions implemented as appropriate)  Flowsheets  Taken 12/14/2019 0412 by Rajani Perales RN  Progress: improving  Taken 12/14/2019 1437 by Betsey Velásquez RN  Plan of Care Reviewed With: patient  Taken 12/15/2019 0543 by Niesha Matos RN  Outcome Summary: VSS, HR still tachy at times, Xanax at bedtime, IV Steroids, 2unit of insulin for blood glucose 172, Respiratory increased O2 to 3L - sats 91-95%. Walking oximetry ordered for AM. No c/o pain. Will CTM.

## 2019-12-15 NOTE — PROGRESS NOTES
Pharmacy Consult: Warfarin Dosing/ Monitoring    Leonor Bhandari is a 64 y.o. female, estimated creatinine clearance is 124.3 mL/min (A) (by C-G formula based on SCr of 0.44 mg/dL (L)). weighing 73.4 kg (161 lb 13.1 oz).     has a past medical history of Anxiety and depression, Asthma, Atrial fibrillation (CMS/HCC), Chronic fatigue, COPD (chronic obstructive pulmonary disease) (CMS/HCC), Graves disease, HTN (hypertension), Hyperlipidemia, Hyperthyroidism, Post-menopausal osteoporosis, and Sleep apnea.    Social History     Tobacco Use    Smoking status: Current Some Day Smoker     Packs/day: 0.25     Years: 15.00     Pack years: 3.75     Types: Cigarettes     Start date: 12/8/1990    Smokeless tobacco: Never Used   Substance Use Topics    Alcohol use: No    Drug use: Never       Results from last 7 days   Lab Units 12/15/19  0605 12/14/19  0344 12/13/19  0619 12/12/19  0502 12/11/19  0534 12/10/19  0236 12/09/19  0905 12/09/19  0232   INR  1.76* 1.97* 2.69* 2.41* 2.25* 2.30*  --  2.76*   HEMOGLOBIN g/dL 14.0 14.3 13.7 14.1 13.9 12.9 12.9  --    HEMATOCRIT % 44.4 43.5 42.2 44.2 45.2 40.6 40.6  --    PLATELETS 10*3/mm3 300 316 300 293 292 285 307  --      Results from last 7 days   Lab Units 12/15/19  0605 12/14/19  0344 12/13/19  0619   SODIUM mmol/L 140 142 139   POTASSIUM mmol/L 4.7 4.3 4.1   CHLORIDE mmol/L 101 103 105   CO2 mmol/L 30.4* 28.6 27.5   BUN mg/dL 16 17 18   CREATININE mg/dL 0.44* 0.32* 0.28*   CALCIUM mg/dL 8.6 8.5* 8.8   BILIRUBIN mg/dL  --  0.3  --    ALK PHOS U/L  --  56  --    ALT (SGPT) U/L  --  18  --    AST (SGOT) U/L  --  14  --    GLUCOSE mg/dL 171* 149* 160*     Anticoagulation history: home med warfarin 6mg MWF and 3mg Tu/Th/Sa/Ramos per patient    Hospital Anticoagulation:  Consulting provider: Dr Montoya  Start date: home med  Indication: A Fib  Target INR: 2-3  Expected duration: lifelong                    Date 12/8 12/9 12/10 12/11 12/12 12/13 12/14 12/15     INR 2.10 2.76 2.30 2.25 2.41  2.69 1.97 1.76     Warfarin dose 3mg 1 mg 2 mg 3 mg 3 mg 1mg 3mg 6mg       Potential drug interactions:   Zithromax - increased INR (last dose 12/12)  Aspirin + APAP - increased risk of bleed (home med)  Lexapro - increased risk of bleed (home med)  Requip - increased INR (home med)  Methimazole - decreased warfarin effectiveness (home med)  Solu-Medrol - increased risk of bleed    Education complete?/ Date: Home med    Assessment/Plan:  INR dropped again today. Note pt is now off azithromycin-- last dose on 12/12.  Will leave home dose entered for continuing therapy. Give additional 3mg today for total 6mg today.  Daily INR.     Pharmacy will continue to follow until discharge or discontinuation of warfarin.   Trina Johnson [facility]  12/15/2019

## 2019-12-15 NOTE — PROGRESS NOTES
Continued Stay Note  [facility]     Patient Name: Leonor Bhandari  MRN: [id number]  Today's Date: 12/15/2019    Admit Date: 12/8/2019    Discharge Plan     Row Name 12/15/19 1311       Plan    Plan  Home, [facility] to follow-up with patient at home tomorrow about neb kit and see if she qualifies for new machine. Pt. will DC with neb supplied from here at [facility] to use in the interim.........Jefe MURRIETA     Patient/Family in Agreement with Plan  yes    Plan Comments  Call from RN stating patient to DC home and requesting new nebulizer machine, per pulmonologist she will likely not qualify at this time but MD requests patient get new neb kit from O2 provider. Call to [facility] states they will get kit out to patient tomorrow and she will check to see if patient qualifies for new machine and will discuss this with patient tomorrow as well. D/W Staff RN Catherine who verified with patient tubing for neb treatments here will fit her home machine and Catherine will send home tubing and neb supplies from [facility] until [facility] delivers new items to patient's home. No further needs at this time.........Jefe MURRIETA         Discharge Codes    No documentation.       Expected Discharge Date and Time     Expected Discharge Date Expected Discharge Time    Dec 15, 2019             Catherine Palacios, GLENNY

## 2019-12-15 NOTE — PROGRESS NOTES
Exercise Oximetry    Patient Name:Leonor Bhandari   MRN: [id number]   Date: 12/15/19             ROOM AIR BASELINE   SpO2% 90%   Heart Rate 102   Blood Pressure      EXERCISE ON ROOM AIR SpO2% EXERCISE ON O2 @ 2 LPM SpO2%   1 MINUTE  1 MINUTE 89   2 MINUTES  2 MINUTES 90   3 MINUTES  3 MINUTES 82   4 MINUTES  4 MINUTES 87   5 MINUTES  5 MINUTES 88   6 MINUTES  6 MINUTES 94              Distance Walked   Distance Walked   Dyspnea (Kevin Scale)   Dyspnea (Kevin Scale)   Fatigue (Kevin Scale) Fatigue (Kevin Scale)   SpO2% Post Exercise   SpO2% Post Exercise  94%   HR Post Exercise   HR Post Exercise 102   Time to Recovery   Time to Recovery      Comments: Pt unable to maintain SPO2 above 88% on 2 lpm, increased to 3 lpm at 3 mins, spo2 then dropped to 87% in which the pt was increased to 4 lpm for the duration of the walk.

## 2019-12-16 NOTE — OUTREACH NOTE
Prep Survey      Responses   Facility patient discharged from?  [facility]   Is patient eligible?  Yes   Discharge diagnosis  **Acute on chronic respiratory failure with hypoxia and hypercapnia    Does the patient have one of the following disease processes/diagnoses(primary or secondary)?  Other   Does the patient have Home health ordered?  No   Is there a DME ordered?  Yes   What DME was ordered?  Nebulizer [facility]    General alerts for this patient  CHF and COPD    Prep survey completed?  Yes          Latoya Rogers RN

## 2019-12-16 NOTE — PROGRESS NOTES
Case Management Discharge Note      Final Note: Pt discharged home    Provided post acute provider list?: Refused    Destination      No service has been selected for the patient.      Durable Medical Equipment      No service has been selected for the patient.      Dialysis/Infusion      No service has been selected for the patient.      Home Medical Care      No service has been selected for the patient.      Therapy      No service has been selected for the patient.      Community Resources      No service has been selected for the patient.        Transportation Services  Private: Car    Final Discharge Disposition Code: 01 - home or self-care

## 2019-12-18 NOTE — OUTREACH NOTE
Medical Week 1 Survey      Responses   Facility patient discharged from?  [facility]   Does the patient have one of the following disease processes/diagnoses(primary or secondary)?  Other   Is there a successful TCM telephone encounter documented?  No   Week 1 attempt successful?  No   Unsuccessful attempts  Attempt 1          Carmen Joshua RN

## 2019-12-20 NOTE — OUTREACH NOTE
Medical Week 1 Survey      Responses   Facility patient discharged from?  [facility]   Does the patient have one of the following disease processes/diagnoses(primary or secondary)?  Other   Is there a successful TCM telephone encounter documented?  No   Week 1 attempt successful?  Yes   Call start time  1322   Rescheduled  Rescheduled-pt requested   Call end time  1323   General alerts for this patient  CHF and COPD    Discharge diagnosis  **Acute on chronic respiratory failure with hypoxia and hypercapnia           Braden Ricks RN

## 2019-12-23 NOTE — OUTREACH NOTE
Medical Week 1 Survey      Responses   Facility patient discharged from?  [facility]   Does the patient have one of the following disease processes/diagnoses(primary or secondary)?  Other   Is there a successful TCM telephone encounter documented?  No   Week 1 attempt successful?  No   Rescheduled  Revoked          George Ivory RN

## 2020-01-01 ENCOUNTER — APPOINTMENT (OUTPATIENT)
Dept: CARDIOLOGY | Facility: HOSPITAL | Age: 65
End: 2020-01-01

## 2020-01-01 ENCOUNTER — APPOINTMENT (OUTPATIENT)
Dept: NEUROLOGY | Facility: HOSPITAL | Age: 65
End: 2020-01-01

## 2020-01-01 ENCOUNTER — APPOINTMENT (OUTPATIENT)
Dept: GENERAL RADIOLOGY | Facility: HOSPITAL | Age: 65
End: 2020-01-01

## 2020-01-01 ENCOUNTER — APPOINTMENT (OUTPATIENT)
Dept: CT IMAGING | Facility: HOSPITAL | Age: 65
End: 2020-01-01

## 2020-01-01 ENCOUNTER — HOSPITAL ENCOUNTER (INPATIENT)
Facility: HOSPITAL | Age: 65
LOS: 3 days | End: 2020-01-19
Attending: EMERGENCY MEDICINE | Admitting: INTERNAL MEDICINE

## 2020-01-01 VITALS
DIASTOLIC BLOOD PRESSURE: 89 MMHG | OXYGEN SATURATION: 81 % | HEIGHT: 62 IN | BODY MASS INDEX: 29.09 KG/M2 | SYSTOLIC BLOOD PRESSURE: 177 MMHG | TEMPERATURE: 99.5 F | WEIGHT: 158.07 LBS

## 2020-01-01 DIAGNOSIS — E87.5 HYPERKALEMIA: ICD-10-CM

## 2020-01-01 DIAGNOSIS — E05.90 HYPERTHYROIDISM: ICD-10-CM

## 2020-01-01 DIAGNOSIS — J96.02 ACUTE RESPIRATORY FAILURE WITH HYPOXIA AND HYPERCAPNIA (HCC): ICD-10-CM

## 2020-01-01 DIAGNOSIS — G25.3 POST HYPOXIC MYOCLONUS: ICD-10-CM

## 2020-01-01 DIAGNOSIS — D72.829 LEUKOCYTOSIS, UNSPECIFIED TYPE: ICD-10-CM

## 2020-01-01 DIAGNOSIS — I46.9 CARDIAC ARREST WITH SUCCESSFUL RESUSCITATION (HCC): Primary | ICD-10-CM

## 2020-01-01 DIAGNOSIS — J96.01 ACUTE RESPIRATORY FAILURE WITH HYPOXIA AND HYPERCAPNIA (HCC): ICD-10-CM

## 2020-01-01 LAB
ALBUMIN SERPL-MCNC: 3 G/DL (ref 3.5–5.2)
ALBUMIN SERPL-MCNC: 3.1 G/DL (ref 3.5–5.2)
ALBUMIN SERPL-MCNC: 3.3 G/DL (ref 3.5–5.2)
ALBUMIN SERPL-MCNC: 3.6 G/DL (ref 3.5–5.2)
ALBUMIN/GLOB SERPL: 0.9 G/DL
ALBUMIN/GLOB SERPL: 1 G/DL
ALBUMIN/GLOB SERPL: 1 G/DL
ALP SERPL-CCNC: 135 U/L (ref 39–117)
ALP SERPL-CCNC: 65 U/L (ref 39–117)
ALP SERPL-CCNC: 95 U/L (ref 39–117)
ALT SERPL W P-5'-P-CCNC: 24 U/L (ref 1–33)
ALT SERPL W P-5'-P-CCNC: 24 U/L (ref 1–33)
ALT SERPL W P-5'-P-CCNC: 44 U/L (ref 1–33)
ANION GAP SERPL CALCULATED.3IONS-SCNC: 14.9 MMOL/L (ref 5–15)
ANION GAP SERPL CALCULATED.3IONS-SCNC: 15.2 MMOL/L (ref 5–15)
ANION GAP SERPL CALCULATED.3IONS-SCNC: 16.7 MMOL/L (ref 5–15)
ANION GAP SERPL CALCULATED.3IONS-SCNC: 19.5 MMOL/L (ref 5–15)
ANION GAP SERPL CALCULATED.3IONS-SCNC: 25.7 MMOL/L (ref 5–15)
AORTIC DIMENSIONLESS INDEX: 0.6 (DI)
ARTERIAL PATENCY WRIST A: POSITIVE
AST SERPL-CCNC: 54 U/L (ref 1–32)
AST SERPL-CCNC: 62 U/L (ref 1–32)
AST SERPL-CCNC: 91 U/L (ref 1–32)
ATMOSPHERIC PRESS: 756 MMHG
ATMOSPHERIC PRESS: 764.3 MMHG
ATMOSPHERIC PRESS: 768.6 MMHG
B PARAPERT DNA SPEC QL NAA+PROBE: NOT DETECTED
B PERT DNA SPEC QL NAA+PROBE: NOT DETECTED
BASE EXCESS BLDA CALC-SCNC: -1.4 MMOL/L (ref 0–2)
BASE EXCESS BLDA CALC-SCNC: -1.8 MMOL/L (ref 0–2)
BASE EXCESS BLDA CALC-SCNC: -26.5 MMOL/L (ref 0–2)
BASOPHILS # BLD AUTO: 0.03 10*3/MM3 (ref 0–0.2)
BASOPHILS # BLD AUTO: 0.06 10*3/MM3 (ref 0–0.2)
BASOPHILS NFR BLD AUTO: 0.2 % (ref 0–1.5)
BASOPHILS NFR BLD AUTO: 0.2 % (ref 0–1.5)
BDY SITE: ABNORMAL
BH CV ECHO MEAS - AO MAX PG (FULL): 8.2 MMHG
BH CV ECHO MEAS - AO MAX PG: 15.2 MMHG
BH CV ECHO MEAS - AO MEAN PG (FULL): 4 MMHG
BH CV ECHO MEAS - AO MEAN PG: 8 MMHG
BH CV ECHO MEAS - AO V2 MAX: 195 CM/SEC
BH CV ECHO MEAS - AO V2 MEAN: 129 CM/SEC
BH CV ECHO MEAS - AO V2 VTI: 21.8 CM
BH CV ECHO MEAS - AVA(I,A): 1.7 CM^2
BH CV ECHO MEAS - AVA(I,D): 1.7 CM^2
BH CV ECHO MEAS - AVA(V,A): 1.7 CM^2
BH CV ECHO MEAS - AVA(V,D): 1.7 CM^2
BH CV ECHO MEAS - BSA(HAYCOCK): 1.8 M^2
BH CV ECHO MEAS - BSA: 1.7 M^2
BH CV ECHO MEAS - BZI_BMI: 29.1 KILOGRAMS/M^2
BH CV ECHO MEAS - BZI_METRIC_HEIGHT: 157.5 CM
BH CV ECHO MEAS - BZI_METRIC_WEIGHT: 72.1 KG
BH CV ECHO MEAS - EDV(CUBED): 64 ML
BH CV ECHO MEAS - EDV(MOD-SP2): 57 ML
BH CV ECHO MEAS - EDV(MOD-SP4): 63 ML
BH CV ECHO MEAS - EDV(TEICH): 70 ML
BH CV ECHO MEAS - EF(CUBED): 69.2 %
BH CV ECHO MEAS - EF(MOD-BP): 62 %
BH CV ECHO MEAS - EF(MOD-SP2): 63.2 %
BH CV ECHO MEAS - EF(MOD-SP4): 61.9 %
BH CV ECHO MEAS - EF(TEICH): 61.4 %
BH CV ECHO MEAS - ESV(CUBED): 19.7 ML
BH CV ECHO MEAS - ESV(MOD-SP2): 21 ML
BH CV ECHO MEAS - ESV(MOD-SP4): 24 ML
BH CV ECHO MEAS - ESV(TEICH): 27 ML
BH CV ECHO MEAS - FS: 32.5 %
BH CV ECHO MEAS - IVS/LVPW: 0.8
BH CV ECHO MEAS - IVSD: 0.8 CM
BH CV ECHO MEAS - LAT PEAK E' VEL: 9.1 CM/SEC
BH CV ECHO MEAS - LV DIASTOLIC VOL/BSA (35-75): 36.3 ML/M^2
BH CV ECHO MEAS - LV MASS(C)D: 109.7 GRAMS
BH CV ECHO MEAS - LV MASS(C)DI: 63.3 GRAMS/M^2
BH CV ECHO MEAS - LV MAX PG: 7 MMHG
BH CV ECHO MEAS - LV MEAN PG: 4 MMHG
BH CV ECHO MEAS - LV SYSTOLIC VOL/BSA (12-30): 13.8 ML/M^2
BH CV ECHO MEAS - LV V1 MAX: 132 CM/SEC
BH CV ECHO MEAS - LV V1 MEAN: 84.8 CM/SEC
BH CV ECHO MEAS - LV V1 VTI: 14.8 CM
BH CV ECHO MEAS - LVIDD: 4 CM
BH CV ECHO MEAS - LVIDS: 2.7 CM
BH CV ECHO MEAS - LVLD AP2: 6.2 CM
BH CV ECHO MEAS - LVLD AP4: 6.5 CM
BH CV ECHO MEAS - LVLS AP2: 5.1 CM
BH CV ECHO MEAS - LVLS AP4: 5.5 CM
BH CV ECHO MEAS - LVOT AREA (M): 2.5 CM^2
BH CV ECHO MEAS - LVOT AREA: 2.5 CM^2
BH CV ECHO MEAS - LVOT DIAM: 1.8 CM
BH CV ECHO MEAS - LVPWD: 1 CM
BH CV ECHO MEAS - MED PEAK E' VEL: 9.03 CM/SEC
BH CV ECHO MEAS - MV DEC SLOPE: 702.4 CM/SEC^2
BH CV ECHO MEAS - MV DEC TIME: 159 SEC
BH CV ECHO MEAS - MV E MAX VEL: 130.5 CM/SEC
BH CV ECHO MEAS - MV MAX PG: 4.6 MMHG
BH CV ECHO MEAS - MV MEAN PG: 2 MMHG
BH CV ECHO MEAS - MV P1/2T MAX VEL: 114 CM/SEC
BH CV ECHO MEAS - MV P1/2T: 47.5 MSEC
BH CV ECHO MEAS - MV V2 MAX: 106.9 CM/SEC
BH CV ECHO MEAS - MV V2 MEAN: 67.3 CM/SEC
BH CV ECHO MEAS - MV V2 VTI: 17.1 CM
BH CV ECHO MEAS - MVA P1/2T LCG: 1.9 CM^2
BH CV ECHO MEAS - MVA(P1/2T): 4.6 CM^2
BH CV ECHO MEAS - MVA(VTI): 2.2 CM^2
BH CV ECHO MEAS - PA ACC TIME: 0.05 SEC
BH CV ECHO MEAS - PA MAX PG (FULL): 3.4 MMHG
BH CV ECHO MEAS - PA MAX PG: 8.2 MMHG
BH CV ECHO MEAS - PA PR(ACCEL): 57.4 MMHG
BH CV ECHO MEAS - PA V2 MAX: 143 CM/SEC
BH CV ECHO MEAS - PULM DIAS VEL: 58.5 CM/SEC
BH CV ECHO MEAS - PULM S/D: 1.1
BH CV ECHO MEAS - PULM SYS VEL: 62.9 CM/SEC
BH CV ECHO MEAS - RAP SYSTOLE: 3 MMHG
BH CV ECHO MEAS - RV MAX PG: 4.8 MMHG
BH CV ECHO MEAS - RV MEAN PG: 3 MMHG
BH CV ECHO MEAS - RV V1 MAX: 109 CM/SEC
BH CV ECHO MEAS - RV V1 MEAN: 73.6 CM/SEC
BH CV ECHO MEAS - RV V1 VTI: 14.4 CM
BH CV ECHO MEAS - RVSP: 43 MMHG
BH CV ECHO MEAS - SI(CUBED): 25.6 ML/M^2
BH CV ECHO MEAS - SI(LVOT): 21.7 ML/M^2
BH CV ECHO MEAS - SI(MOD-SP2): 20.8 ML/M^2
BH CV ECHO MEAS - SI(MOD-SP4): 22.5 ML/M^2
BH CV ECHO MEAS - SI(TEICH): 24.8 ML/M^2
BH CV ECHO MEAS - SV(CUBED): 44.3 ML
BH CV ECHO MEAS - SV(LVOT): 37.7 ML
BH CV ECHO MEAS - SV(MOD-SP2): 36 ML
BH CV ECHO MEAS - SV(MOD-SP4): 39 ML
BH CV ECHO MEAS - SV(TEICH): 43 ML
BH CV ECHO MEAS - TR MAX PG: 40 MMHG
BH CV ECHO MEAS - TR MAX VEL: 317 CM/SEC
BH CV ECHO MEASUREMENTS AVERAGE E/E' RATIO: 14.4
BH CV XLRA - RV BASE: 3.5 CM
BH CV XLRA - TDI S': 12.2 CM/SEC
BILIRUB SERPL-MCNC: 0.2 MG/DL (ref 0.2–1.2)
BILIRUB SERPL-MCNC: 0.4 MG/DL (ref 0.2–1.2)
BILIRUB SERPL-MCNC: 0.5 MG/DL (ref 0.2–1.2)
BUN BLD-MCNC: 11 MG/DL (ref 8–23)
BUN BLD-MCNC: 22 MG/DL (ref 8–23)
BUN BLD-MCNC: 28 MG/DL (ref 8–23)
BUN BLD-MCNC: 35 MG/DL (ref 8–23)
BUN BLD-MCNC: 6 MG/DL (ref 8–23)
BUN/CREAT SERPL: 16.7 (ref 7–25)
BUN/CREAT SERPL: 30.6 (ref 7–25)
BUN/CREAT SERPL: 46.7 (ref 7–25)
BUN/CREAT SERPL: 46.7 (ref 7–25)
BUN/CREAT SERPL: 8.6 (ref 7–25)
C PNEUM DNA NPH QL NAA+NON-PROBE: NOT DETECTED
CALCIUM SPEC-SCNC: 10.6 MG/DL (ref 8.6–10.5)
CALCIUM SPEC-SCNC: 8.7 MG/DL (ref 8.6–10.5)
CALCIUM SPEC-SCNC: 8.8 MG/DL (ref 8.6–10.5)
CALCIUM SPEC-SCNC: 9.2 MG/DL (ref 8.6–10.5)
CALCIUM SPEC-SCNC: 9.2 MG/DL (ref 8.6–10.5)
CHLORIDE SERPL-SCNC: 104 MMOL/L (ref 98–107)
CHLORIDE SERPL-SCNC: 107 MMOL/L (ref 98–107)
CHLORIDE SERPL-SCNC: 111 MMOL/L (ref 98–107)
CHLORIDE SERPL-SCNC: 119 MMOL/L (ref 98–107)
CHLORIDE SERPL-SCNC: 95 MMOL/L (ref 98–107)
CO2 SERPL-SCNC: 17.3 MMOL/L (ref 22–29)
CO2 SERPL-SCNC: 19.3 MMOL/L (ref 22–29)
CO2 SERPL-SCNC: 19.5 MMOL/L (ref 22–29)
CO2 SERPL-SCNC: 23.1 MMOL/L (ref 22–29)
CO2 SERPL-SCNC: 25.8 MMOL/L (ref 22–29)
CREAT BLD-MCNC: 0.6 MG/DL (ref 0.57–1)
CREAT BLD-MCNC: 0.66 MG/DL (ref 0.57–1)
CREAT BLD-MCNC: 0.7 MG/DL (ref 0.57–1)
CREAT BLD-MCNC: 0.72 MG/DL (ref 0.57–1)
CREAT BLD-MCNC: 0.75 MG/DL (ref 0.57–1)
D-LACTATE SERPL-SCNC: 2 MMOL/L (ref 0.5–2)
DEPRECATED RDW RBC AUTO: 46.1 FL (ref 37–54)
DEPRECATED RDW RBC AUTO: 47.9 FL (ref 37–54)
DEPRECATED RDW RBC AUTO: 48.9 FL (ref 37–54)
DEPRECATED RDW RBC AUTO: 49 FL (ref 37–54)
DIGOXIN SERPL-MCNC: 1 NG/ML (ref 0.6–1.2)
DIGOXIN SERPL-MCNC: 1.2 NG/ML (ref 0.6–1.2)
EOSINOPHIL # BLD AUTO: 0 10*3/MM3 (ref 0–0.4)
EOSINOPHIL # BLD AUTO: 0 10*3/MM3 (ref 0–0.4)
EOSINOPHIL # BLD MANUAL: 0.42 10*3/MM3 (ref 0–0.4)
EOSINOPHIL NFR BLD AUTO: 0 % (ref 0.3–6.2)
EOSINOPHIL NFR BLD AUTO: 0 % (ref 0.3–6.2)
EOSINOPHIL NFR BLD MANUAL: 2 % (ref 0.3–6.2)
ERYTHROCYTE [DISTWIDTH] IN BLOOD BY AUTOMATED COUNT: 16.3 % (ref 12.3–15.4)
ERYTHROCYTE [DISTWIDTH] IN BLOOD BY AUTOMATED COUNT: 16.4 % (ref 12.3–15.4)
ERYTHROCYTE [DISTWIDTH] IN BLOOD BY AUTOMATED COUNT: 16.8 % (ref 12.3–15.4)
ERYTHROCYTE [DISTWIDTH] IN BLOOD BY AUTOMATED COUNT: 17.1 % (ref 12.3–15.4)
FLUAV H1 2009 PAND RNA NPH QL NAA+PROBE: NOT DETECTED
FLUAV H1 HA GENE NPH QL NAA+PROBE: NOT DETECTED
FLUAV H3 RNA NPH QL NAA+PROBE: NOT DETECTED
FLUAV SUBTYP SPEC NAA+PROBE: NOT DETECTED
FLUBV RNA ISLT QL NAA+PROBE: NOT DETECTED
GFR SERPL CREATININE-BSD FRML MDRD: 101 ML/MIN/1.73
GFR SERPL CREATININE-BSD FRML MDRD: 78 ML/MIN/1.73
GFR SERPL CREATININE-BSD FRML MDRD: 82 ML/MIN/1.73
GFR SERPL CREATININE-BSD FRML MDRD: 85 ML/MIN/1.73
GFR SERPL CREATININE-BSD FRML MDRD: 91 ML/MIN/1.73
GLOBULIN UR ELPH-MCNC: 3.2 GM/DL
GLOBULIN UR ELPH-MCNC: 3.4 GM/DL
GLOBULIN UR ELPH-MCNC: 3.7 GM/DL
GLUCOSE BLD-MCNC: 139 MG/DL (ref 65–99)
GLUCOSE BLD-MCNC: 173 MG/DL (ref 65–99)
GLUCOSE BLD-MCNC: 212 MG/DL (ref 65–99)
GLUCOSE BLD-MCNC: 216 MG/DL (ref 65–99)
GLUCOSE BLD-MCNC: 99 MG/DL (ref 65–99)
GLUCOSE BLDC GLUCOMTR-MCNC: 128 MG/DL (ref 70–130)
GLUCOSE BLDC GLUCOMTR-MCNC: 152 MG/DL (ref 70–130)
GLUCOSE BLDC GLUCOMTR-MCNC: 165 MG/DL (ref 70–130)
GLUCOSE BLDC GLUCOMTR-MCNC: 170 MG/DL (ref 70–130)
GLUCOSE BLDC GLUCOMTR-MCNC: 171 MG/DL (ref 70–130)
GLUCOSE BLDC GLUCOMTR-MCNC: 178 MG/DL (ref 70–130)
GLUCOSE BLDC GLUCOMTR-MCNC: 201 MG/DL (ref 70–130)
GLUCOSE BLDC GLUCOMTR-MCNC: 218 MG/DL (ref 70–130)
GLUCOSE BLDC GLUCOMTR-MCNC: 67 MG/DL (ref 70–130)
GLUCOSE BLDC GLUCOMTR-MCNC: 83 MG/DL (ref 70–130)
GLUCOSE BLDC GLUCOMTR-MCNC: 85 MG/DL (ref 70–130)
HADV DNA SPEC NAA+PROBE: NOT DETECTED
HCO3 BLDA-SCNC: 12.7 MMOL/L (ref 22–28)
HCO3 BLDA-SCNC: 23.8 MMOL/L (ref 22–28)
HCO3 BLDA-SCNC: 27.9 MMOL/L (ref 22–28)
HCOV 229E RNA SPEC QL NAA+PROBE: NOT DETECTED
HCOV HKU1 RNA SPEC QL NAA+PROBE: NOT DETECTED
HCOV NL63 RNA SPEC QL NAA+PROBE: NOT DETECTED
HCOV OC43 RNA SPEC QL NAA+PROBE: NOT DETECTED
HCT VFR BLD AUTO: 40.7 % (ref 34–46.6)
HCT VFR BLD AUTO: 42.1 % (ref 34–46.6)
HCT VFR BLD AUTO: 43.9 % (ref 34–46.6)
HCT VFR BLD AUTO: 47.6 % (ref 34–46.6)
HGB BLD-MCNC: 13.2 G/DL (ref 12–15.9)
HGB BLD-MCNC: 13.4 G/DL (ref 12–15.9)
HGB BLD-MCNC: 13.8 G/DL (ref 12–15.9)
HGB BLD-MCNC: 14.5 G/DL (ref 12–15.9)
HMPV RNA NPH QL NAA+NON-PROBE: NOT DETECTED
HOROWITZ INDEX BLD+IHG-RTO: 100 %
HOROWITZ INDEX BLD+IHG-RTO: 40 %
HOROWITZ INDEX BLD+IHG-RTO: 40 %
HPIV1 RNA SPEC QL NAA+PROBE: NOT DETECTED
HPIV2 RNA SPEC QL NAA+PROBE: NOT DETECTED
HPIV3 RNA NPH QL NAA+PROBE: NOT DETECTED
HPIV4 P GENE NPH QL NAA+PROBE: NOT DETECTED
IMM GRANULOCYTES # BLD AUTO: 0.13 10*3/MM3 (ref 0–0.05)
IMM GRANULOCYTES # BLD AUTO: 0.23 10*3/MM3 (ref 0–0.05)
IMM GRANULOCYTES NFR BLD AUTO: 0.7 % (ref 0–0.5)
IMM GRANULOCYTES NFR BLD AUTO: 0.8 % (ref 0–0.5)
INR PPP: 1.33 (ref 0.9–1.1)
INR PPP: 1.82 (ref 0.9–1.1)
INR PPP: 2.75 (ref 0.9–1.1)
INR PPP: 2.77 (ref 0.9–1.1)
L PNEUMO1 AG UR QL IA: NEGATIVE
LEFT ATRIUM VOLUME INDEX: 22.4 ML/M2
LYMPHOCYTES # BLD AUTO: 0.57 10*3/MM3 (ref 0.7–3.1)
LYMPHOCYTES # BLD AUTO: 0.75 10*3/MM3 (ref 0.7–3.1)
LYMPHOCYTES # BLD MANUAL: 1.27 10*3/MM3 (ref 0.7–3.1)
LYMPHOCYTES NFR BLD AUTO: 2.1 % (ref 19.6–45.3)
LYMPHOCYTES NFR BLD AUTO: 3.9 % (ref 19.6–45.3)
LYMPHOCYTES NFR BLD MANUAL: 6 % (ref 19.6–45.3)
LYMPHOCYTES NFR BLD MANUAL: 9 % (ref 5–12)
M PNEUMO IGG SER IA-ACNC: NOT DETECTED
MAGNESIUM SERPL-MCNC: 1.9 MG/DL (ref 1.6–2.4)
MAGNESIUM SERPL-MCNC: 2.4 MG/DL (ref 1.6–2.4)
MAGNESIUM SERPL-MCNC: 2.8 MG/DL (ref 1.6–2.4)
MCH RBC QN AUTO: 24.9 PG (ref 26.6–33)
MCH RBC QN AUTO: 25.8 PG (ref 26.6–33)
MCH RBC QN AUTO: 25.8 PG (ref 26.6–33)
MCH RBC QN AUTO: 26.1 PG (ref 26.6–33)
MCHC RBC AUTO-ENTMCNC: 30.5 G/DL (ref 31.5–35.7)
MCHC RBC AUTO-ENTMCNC: 31.4 G/DL (ref 31.5–35.7)
MCHC RBC AUTO-ENTMCNC: 31.8 G/DL (ref 31.5–35.7)
MCHC RBC AUTO-ENTMCNC: 32.4 G/DL (ref 31.5–35.7)
MCV RBC AUTO: 79.1 FL (ref 79–97)
MCV RBC AUTO: 80.6 FL (ref 79–97)
MCV RBC AUTO: 81.1 FL (ref 79–97)
MCV RBC AUTO: 84.5 FL (ref 79–97)
METAMYELOCYTES NFR BLD MANUAL: 1 % (ref 0–0)
MODALITY: ABNORMAL
MONOCYTES # BLD AUTO: 1.05 10*3/MM3 (ref 0.1–0.9)
MONOCYTES # BLD AUTO: 1.69 10*3/MM3 (ref 0.1–0.9)
MONOCYTES # BLD AUTO: 1.9 10*3/MM3 (ref 0.1–0.9)
MONOCYTES NFR BLD AUTO: 3.9 % (ref 5–12)
MONOCYTES NFR BLD AUTO: 8.8 % (ref 5–12)
MRSA DNA SPEC QL NAA+PROBE: NORMAL
MYELOCYTES NFR BLD MANUAL: 3 % (ref 0–0)
NEUTROPHILS # BLD AUTO: 16.71 10*3/MM3 (ref 1.7–7)
NEUTROPHILS # BLD AUTO: 25.16 10*3/MM3 (ref 1.7–7)
NEUTROPHILS # BLD AUTO: 9.71 10*3/MM3 (ref 1.7–7)
NEUTROPHILS NFR BLD AUTO: 86.4 % (ref 42.7–76)
NEUTROPHILS NFR BLD AUTO: 93 % (ref 42.7–76)
NEUTROPHILS NFR BLD MANUAL: 46 % (ref 42.7–76)
NRBC BLD AUTO-RTO: 0 /100 WBC (ref 0–0.2)
NRBC BLD AUTO-RTO: 0 /100 WBC (ref 0–0.2)
NT-PROBNP SERPL-MCNC: 1474 PG/ML (ref 5–900)
NT-PROBNP SERPL-MCNC: 1747 PG/ML (ref 5–900)
NT-PROBNP SERPL-MCNC: 2439 PG/ML (ref 5–900)
O2 A-A PPRESDIFF RESPIRATORY: 0.4 MMHG
O2 A-A PPRESDIFF RESPIRATORY: 0.5 MMHG
O2 A-A PPRESDIFF RESPIRATORY: 0.5 MMHG
PCO2 BLDA: 105.5 MM HG (ref 35–45)
PCO2 BLDA: 41.1 MM HG (ref 35–45)
PCO2 BLDA: 68.6 MM HG (ref 35–45)
PEEP RESPIRATORY: 5 CM[H2O]
PEEP RESPIRATORY: 5 CM[H2O]
PEEP RESPIRATORY: 8 CM[H2O]
PH BLDA: 6.69 PH UNITS (ref 7.35–7.45)
PH BLDA: 7.22 PH UNITS (ref 7.35–7.45)
PH BLDA: 7.37 PH UNITS (ref 7.35–7.45)
PHOSPHATE SERPL-MCNC: 2.5 MG/DL (ref 2.5–4.5)
PHOSPHATE SERPL-MCNC: 3.2 MG/DL (ref 2.5–4.5)
PHOSPHATE SERPL-MCNC: 6.3 MG/DL (ref 2.5–4.5)
PLAT MORPH BLD: NORMAL
PLATELET # BLD AUTO: 305 10*3/MM3 (ref 140–450)
PLATELET # BLD AUTO: 312 10*3/MM3 (ref 140–450)
PLATELET # BLD AUTO: 388 10*3/MM3 (ref 140–450)
PLATELET # BLD AUTO: 390 10*3/MM3 (ref 140–450)
PMV BLD AUTO: 10.3 FL (ref 6–12)
PMV BLD AUTO: 10.5 FL (ref 6–12)
PMV BLD AUTO: 9.6 FL (ref 6–12)
PMV BLD AUTO: 9.9 FL (ref 6–12)
PO2 BLDA: 120.2 MM HG (ref 80–100)
PO2 BLDA: 346.7 MM HG (ref 80–100)
PO2 BLDA: 86.4 MM HG (ref 80–100)
POTASSIUM BLD-SCNC: 3.7 MMOL/L (ref 3.5–5.2)
POTASSIUM BLD-SCNC: 3.8 MMOL/L (ref 3.5–5.2)
POTASSIUM BLD-SCNC: 4.1 MMOL/L (ref 3.5–5.2)
POTASSIUM BLD-SCNC: 4.3 MMOL/L (ref 3.5–5.2)
POTASSIUM BLD-SCNC: 6.4 MMOL/L (ref 3.5–5.2)
PROCALCITONIN SERPL-MCNC: 4.86 NG/ML (ref 0.1–0.25)
PROCALCITONIN SERPL-MCNC: 6.2 NG/ML (ref 0.1–0.25)
PROT SERPL-MCNC: 6.5 G/DL (ref 6–8.5)
PROT SERPL-MCNC: 6.5 G/DL (ref 6–8.5)
PROT SERPL-MCNC: 7.3 G/DL (ref 6–8.5)
PROTHROMBIN TIME: 16.1 SECONDS (ref 11.7–14.2)
PROTHROMBIN TIME: 20.7 SECONDS (ref 11.7–14.2)
PROTHROMBIN TIME: 28.8 SECONDS (ref 11.7–14.2)
PROTHROMBIN TIME: 29 SECONDS (ref 11.7–14.2)
RBC # BLD AUTO: 5.05 10*6/MM3 (ref 3.77–5.28)
RBC # BLD AUTO: 5.19 10*6/MM3 (ref 3.77–5.28)
RBC # BLD AUTO: 5.55 10*6/MM3 (ref 3.77–5.28)
RBC # BLD AUTO: 5.63 10*6/MM3 (ref 3.77–5.28)
RBC MORPH BLD: NORMAL
RHINOVIRUS RNA SPEC NAA+PROBE: NOT DETECTED
RSV RNA NPH QL NAA+NON-PROBE: NOT DETECTED
S PNEUM AG SPEC QL LA: NEGATIVE
SAO2 % BLDCOA: 93.8 % (ref 92–99)
SAO2 % BLDCOA: 98.6 % (ref 92–99)
SAO2 % BLDCOA: 99.5 % (ref 92–99)
SET MECH RESP RATE: 12
SET MECH RESP RATE: 26
SET MECH RESP RATE: 30
SODIUM BLD-SCNC: 138 MMOL/L (ref 136–145)
SODIUM BLD-SCNC: 145 MMOL/L (ref 136–145)
SODIUM BLD-SCNC: 146 MMOL/L (ref 136–145)
SODIUM BLD-SCNC: 147 MMOL/L (ref 136–145)
SODIUM BLD-SCNC: 157 MMOL/L (ref 136–145)
T3FREE SERPL-MCNC: 16.6 PG/ML (ref 2–4.4)
T4 FREE SERPL-MCNC: 2.21 NG/DL (ref 0.93–1.7)
TOTAL RATE: 24 BREATHS/MINUTE
TOTAL RATE: 26 BREATHS/MINUTE
TOTAL RATE: 30 BREATHS/MINUTE
TROPONIN T SERPL-MCNC: <0.01 NG/ML (ref 0–0.03)
TSH SERPL DL<=0.05 MIU/L-ACNC: <0.005 UIU/ML (ref 0.27–4.2)
VARIANT LYMPHS NFR BLD MANUAL: 33 % (ref 0–5)
VENTILATOR MODE: ABNORMAL
VENTILATOR MODE: AC
VENTILATOR MODE: AC
VT ON VENT VENT: 430 ML
VT ON VENT VENT: 500 ML
WBC MORPH BLD: NORMAL
WBC NRBC COR # BLD: 19.31 10*3/MM3 (ref 3.4–10.8)
WBC NRBC COR # BLD: 21.11 10*3/MM3 (ref 3.4–10.8)
WBC NRBC COR # BLD: 27.07 10*3/MM3 (ref 3.4–10.8)
WBC NRBC COR # BLD: 34.01 10*3/MM3 (ref 3.4–10.8)

## 2020-01-01 PROCEDURE — 25010000002 METHYLPREDNISOLONE PER 125 MG: Performed by: EMERGENCY MEDICINE

## 2020-01-01 PROCEDURE — 87641 MR-STAPH DNA AMP PROBE: CPT | Performed by: INTERNAL MEDICINE

## 2020-01-01 PROCEDURE — 25010000002 MORPHINE PER 10 MG: Performed by: INTERNAL MEDICINE

## 2020-01-01 PROCEDURE — 25010000002 PIPERACILLIN SOD-TAZOBACTAM PER 1 G: Performed by: INTERNAL MEDICINE

## 2020-01-01 PROCEDURE — 85025 COMPLETE CBC W/AUTO DIFF WBC: CPT | Performed by: INTERNAL MEDICINE

## 2020-01-01 PROCEDURE — 87899 AGENT NOS ASSAY W/OPTIC: CPT | Performed by: INTERNAL MEDICINE

## 2020-01-01 PROCEDURE — 94799 UNLISTED PULMONARY SVC/PX: CPT

## 2020-01-01 PROCEDURE — 94003 VENT MGMT INPAT SUBQ DAY: CPT

## 2020-01-01 PROCEDURE — 85610 PROTHROMBIN TIME: CPT | Performed by: EMERGENCY MEDICINE

## 2020-01-01 PROCEDURE — 25010000002 CEFTRIAXONE PER 250 MG: Performed by: INTERNAL MEDICINE

## 2020-01-01 PROCEDURE — 70450 CT HEAD/BRAIN W/O DYE: CPT

## 2020-01-01 PROCEDURE — 25010000002 LORAZEPAM PER 2 MG

## 2020-01-01 PROCEDURE — 99222 1ST HOSP IP/OBS MODERATE 55: CPT | Performed by: PSYCHIATRY & NEUROLOGY

## 2020-01-01 PROCEDURE — 92950 HEART/LUNG RESUSCITATION CPR: CPT

## 2020-01-01 PROCEDURE — 80053 COMPREHEN METABOLIC PANEL: CPT | Performed by: INTERNAL MEDICINE

## 2020-01-01 PROCEDURE — 94640 AIRWAY INHALATION TREATMENT: CPT

## 2020-01-01 PROCEDURE — 63710000001 INSULIN REGULAR HUMAN PER 5 UNITS: Performed by: EMERGENCY MEDICINE

## 2020-01-01 PROCEDURE — 99285 EMERGENCY DEPT VISIT HI MDM: CPT

## 2020-01-01 PROCEDURE — 25010000002 DIGOXIN PER 500 MCG: Performed by: INTERNAL MEDICINE

## 2020-01-01 PROCEDURE — 25010000002 PROPOFOL 10 MG/ML EMULSION: Performed by: EMERGENCY MEDICINE

## 2020-01-01 PROCEDURE — 0100U HC BIOFIRE FILMARRAY RESP PANEL 2: CPT | Performed by: INTERNAL MEDICINE

## 2020-01-01 PROCEDURE — 31500 INSERT EMERGENCY AIRWAY: CPT

## 2020-01-01 PROCEDURE — 25010000002 METHYLPREDNISOLONE PER 40 MG: Performed by: INTERNAL MEDICINE

## 2020-01-01 PROCEDURE — 25010000002 VANCOMYCIN 10 G RECONSTITUTED SOLUTION: Performed by: INTERNAL MEDICINE

## 2020-01-01 PROCEDURE — 85027 COMPLETE CBC AUTOMATED: CPT | Performed by: INTERNAL MEDICINE

## 2020-01-01 PROCEDURE — 93005 ELECTROCARDIOGRAM TRACING: CPT | Performed by: EMERGENCY MEDICINE

## 2020-01-01 PROCEDURE — 63710000001 INSULIN REGULAR HUMAN PER 5 UNITS: Performed by: INTERNAL MEDICINE

## 2020-01-01 PROCEDURE — 83735 ASSAY OF MAGNESIUM: CPT | Performed by: INTERNAL MEDICINE

## 2020-01-01 PROCEDURE — 95816 EEG AWAKE AND DROWSY: CPT | Performed by: PSYCHIATRY & NEUROLOGY

## 2020-01-01 PROCEDURE — 93010 ELECTROCARDIOGRAM REPORT: CPT | Performed by: INTERNAL MEDICINE

## 2020-01-01 PROCEDURE — 25010000002 LORAZEPAM PER 2 MG: Performed by: EMERGENCY MEDICINE

## 2020-01-01 PROCEDURE — 83880 ASSAY OF NATRIURETIC PEPTIDE: CPT | Performed by: INTERNAL MEDICINE

## 2020-01-01 PROCEDURE — 25010000002 HYDROMORPHONE 1 MG/ML SOLUTION: Performed by: INTERNAL MEDICINE

## 2020-01-01 PROCEDURE — 99232 SBSQ HOSP IP/OBS MODERATE 35: CPT | Performed by: PSYCHIATRY & NEUROLOGY

## 2020-01-01 PROCEDURE — 25010000002 FENTANYL CITRATE (PF) 100 MCG/2ML SOLUTION: Performed by: INTERNAL MEDICINE

## 2020-01-01 PROCEDURE — 85610 PROTHROMBIN TIME: CPT | Performed by: INTERNAL MEDICINE

## 2020-01-01 PROCEDURE — 93005 ELECTROCARDIOGRAM TRACING: CPT | Performed by: INTERNAL MEDICINE

## 2020-01-01 PROCEDURE — 80069 RENAL FUNCTION PANEL: CPT | Performed by: INTERNAL MEDICINE

## 2020-01-01 PROCEDURE — 25010000002 CALCIUM GLUCONATE PER 10 ML: Performed by: EMERGENCY MEDICINE

## 2020-01-01 PROCEDURE — 5A1945Z RESPIRATORY VENTILATION, 24-96 CONSECUTIVE HOURS: ICD-10-PCS | Performed by: INTERNAL MEDICINE

## 2020-01-01 PROCEDURE — 87040 BLOOD CULTURE FOR BACTERIA: CPT | Performed by: INTERNAL MEDICINE

## 2020-01-01 PROCEDURE — 82962 GLUCOSE BLOOD TEST: CPT

## 2020-01-01 PROCEDURE — 25010000002 LEVETIRACETAM IN NACL 0.82% 500 MG/100ML SOLUTION: Performed by: INTERNAL MEDICINE

## 2020-01-01 PROCEDURE — 82803 BLOOD GASES ANY COMBINATION: CPT

## 2020-01-01 PROCEDURE — 80162 ASSAY OF DIGOXIN TOTAL: CPT | Performed by: EMERGENCY MEDICINE

## 2020-01-01 PROCEDURE — 80053 COMPREHEN METABOLIC PANEL: CPT | Performed by: EMERGENCY MEDICINE

## 2020-01-01 PROCEDURE — 84439 ASSAY OF FREE THYROXINE: CPT | Performed by: EMERGENCY MEDICINE

## 2020-01-01 PROCEDURE — 85007 BL SMEAR W/DIFF WBC COUNT: CPT | Performed by: EMERGENCY MEDICINE

## 2020-01-01 PROCEDURE — 25010000002 ENOXAPARIN PER 10 MG: Performed by: INTERNAL MEDICINE

## 2020-01-01 PROCEDURE — 87070 CULTURE OTHR SPECIMN AEROBIC: CPT | Performed by: INTERNAL MEDICINE

## 2020-01-01 PROCEDURE — 84443 ASSAY THYROID STIM HORMONE: CPT | Performed by: EMERGENCY MEDICINE

## 2020-01-01 PROCEDURE — 84100 ASSAY OF PHOSPHORUS: CPT | Performed by: INTERNAL MEDICINE

## 2020-01-01 PROCEDURE — 25010000002 LORAZEPAM PER 2 MG: Performed by: INTERNAL MEDICINE

## 2020-01-01 PROCEDURE — 71045 X-RAY EXAM CHEST 1 VIEW: CPT

## 2020-01-01 PROCEDURE — 93306 TTE W/DOPPLER COMPLETE: CPT | Performed by: INTERNAL MEDICINE

## 2020-01-01 PROCEDURE — 83735 ASSAY OF MAGNESIUM: CPT | Performed by: EMERGENCY MEDICINE

## 2020-01-01 PROCEDURE — 94002 VENT MGMT INPAT INIT DAY: CPT

## 2020-01-01 PROCEDURE — 25010000002 MORPHINE PER 10 MG

## 2020-01-01 PROCEDURE — 93306 TTE W/DOPPLER COMPLETE: CPT

## 2020-01-01 PROCEDURE — 87205 SMEAR GRAM STAIN: CPT | Performed by: INTERNAL MEDICINE

## 2020-01-01 PROCEDURE — 36600 WITHDRAWAL OF ARTERIAL BLOOD: CPT

## 2020-01-01 PROCEDURE — 99231 SBSQ HOSP IP/OBS SF/LOW 25: CPT | Performed by: PSYCHIATRY & NEUROLOGY

## 2020-01-01 PROCEDURE — 84145 PROCALCITONIN (PCT): CPT | Performed by: INTERNAL MEDICINE

## 2020-01-01 PROCEDURE — 71250 CT THORAX DX C-: CPT

## 2020-01-01 PROCEDURE — 83605 ASSAY OF LACTIC ACID: CPT | Performed by: INTERNAL MEDICINE

## 2020-01-01 PROCEDURE — 83880 ASSAY OF NATRIURETIC PEPTIDE: CPT | Performed by: EMERGENCY MEDICINE

## 2020-01-01 PROCEDURE — 80162 ASSAY OF DIGOXIN TOTAL: CPT | Performed by: INTERNAL MEDICINE

## 2020-01-01 PROCEDURE — 95816 EEG AWAKE AND DROWSY: CPT

## 2020-01-01 PROCEDURE — 84481 FREE ASSAY (FT-3): CPT | Performed by: EMERGENCY MEDICINE

## 2020-01-01 PROCEDURE — 99222 1ST HOSP IP/OBS MODERATE 55: CPT | Performed by: INTERNAL MEDICINE

## 2020-01-01 PROCEDURE — 84484 ASSAY OF TROPONIN QUANT: CPT | Performed by: EMERGENCY MEDICINE

## 2020-01-01 PROCEDURE — 85025 COMPLETE CBC W/AUTO DIFF WBC: CPT | Performed by: EMERGENCY MEDICINE

## 2020-01-01 PROCEDURE — 0BH17EZ INSERTION OF ENDOTRACHEAL AIRWAY INTO TRACHEA, VIA NATURAL OR ARTIFICIAL OPENING: ICD-10-PCS | Performed by: INTERNAL MEDICINE

## 2020-01-01 PROCEDURE — 25010000002 EPINEPHRINE PF 1 MG/10ML SOLUTION PREFILLED SYRINGE: Performed by: EMERGENCY MEDICINE

## 2020-01-01 RX ORDER — DIGOXIN 0.25 MG/ML
500 INJECTION INTRAMUSCULAR; INTRAVENOUS ONCE
Status: COMPLETED | OUTPATIENT
Start: 2020-01-01 | End: 2020-01-01

## 2020-01-01 RX ORDER — ACETAMINOPHEN 650 MG/1
650 SUPPOSITORY RECTAL EVERY 4 HOURS PRN
Status: DISCONTINUED | OUTPATIENT
Start: 2020-01-01 | End: 2020-01-01 | Stop reason: HOSPADM

## 2020-01-01 RX ORDER — CEFTRIAXONE SODIUM 1 G/50ML
1 INJECTION, SOLUTION INTRAVENOUS EVERY 24 HOURS
Status: DISCONTINUED | OUTPATIENT
Start: 2020-01-01 | End: 2020-01-01

## 2020-01-01 RX ORDER — SODIUM CHLORIDE 0.9 % (FLUSH) 0.9 %
10 SYRINGE (ML) INJECTION AS NEEDED
Status: DISCONTINUED | OUTPATIENT
Start: 2020-01-01 | End: 2020-01-01

## 2020-01-01 RX ORDER — ACETAMINOPHEN 160 MG/5ML
650 SOLUTION ORAL EVERY 4 HOURS PRN
Status: DISCONTINUED | OUTPATIENT
Start: 2020-01-01 | End: 2020-01-01 | Stop reason: HOSPADM

## 2020-01-01 RX ORDER — MORPHINE SULFATE 2 MG/ML
6 INJECTION, SOLUTION INTRAMUSCULAR; INTRAVENOUS
Status: DISCONTINUED | OUTPATIENT
Start: 2020-01-01 | End: 2020-01-01 | Stop reason: HOSPADM

## 2020-01-01 RX ORDER — ROCURONIUM BROMIDE 10 MG/ML
1 INJECTION, SOLUTION INTRAVENOUS ONCE
Status: COMPLETED | OUTPATIENT
Start: 2020-01-01 | End: 2020-01-01

## 2020-01-01 RX ORDER — MORPHINE SULFATE 20 MG/ML
10 SOLUTION ORAL
Status: DISCONTINUED | OUTPATIENT
Start: 2020-01-01 | End: 2020-01-01 | Stop reason: HOSPADM

## 2020-01-01 RX ORDER — FAMOTIDINE 10 MG/ML
20 INJECTION, SOLUTION INTRAVENOUS EVERY 12 HOURS SCHEDULED
Status: DISCONTINUED | OUTPATIENT
Start: 2020-01-01 | End: 2020-01-01

## 2020-01-01 RX ORDER — MORPHINE SULFATE 2 MG/ML
2 INJECTION, SOLUTION INTRAMUSCULAR; INTRAVENOUS
Status: DISCONTINUED | OUTPATIENT
Start: 2020-01-01 | End: 2020-01-01 | Stop reason: HOSPADM

## 2020-01-01 RX ORDER — DEXTROSE MONOHYDRATE 25 G/50ML
25 INJECTION, SOLUTION INTRAVENOUS
Status: DISCONTINUED | OUTPATIENT
Start: 2020-01-01 | End: 2020-01-01

## 2020-01-01 RX ORDER — FENTANYL CITRATE 50 UG/ML
100 INJECTION, SOLUTION INTRAMUSCULAR; INTRAVENOUS
Status: DISCONTINUED | OUTPATIENT
Start: 2020-01-01 | End: 2020-01-01

## 2020-01-01 RX ORDER — DIPHENHYDRAMINE HYDROCHLORIDE 50 MG/ML
25 INJECTION INTRAMUSCULAR; INTRAVENOUS EVERY 6 HOURS PRN
Status: DISCONTINUED | OUTPATIENT
Start: 2020-01-01 | End: 2020-01-01 | Stop reason: HOSPADM

## 2020-01-01 RX ORDER — LORAZEPAM 2 MG/ML
0.5 CONCENTRATE ORAL
Status: DISCONTINUED | OUTPATIENT
Start: 2020-01-01 | End: 2020-01-01 | Stop reason: HOSPADM

## 2020-01-01 RX ORDER — PROMETHAZINE HYDROCHLORIDE 25 MG/1
12.5 SUPPOSITORY RECTAL EVERY 4 HOURS PRN
Status: DISCONTINUED | OUTPATIENT
Start: 2020-01-01 | End: 2020-01-01 | Stop reason: HOSPADM

## 2020-01-01 RX ORDER — HYDROMORPHONE HYDROCHLORIDE 1 MG/ML
0.5 INJECTION, SOLUTION INTRAMUSCULAR; INTRAVENOUS; SUBCUTANEOUS
Status: DISCONTINUED | OUTPATIENT
Start: 2020-01-01 | End: 2020-01-01 | Stop reason: HOSPADM

## 2020-01-01 RX ORDER — LORAZEPAM 2 MG/ML
2 INJECTION INTRAMUSCULAR
Status: DISCONTINUED | OUTPATIENT
Start: 2020-01-01 | End: 2020-01-01

## 2020-01-01 RX ORDER — LORAZEPAM 2 MG/ML
1 CONCENTRATE ORAL
Status: DISCONTINUED | OUTPATIENT
Start: 2020-01-01 | End: 2020-01-01 | Stop reason: HOSPADM

## 2020-01-01 RX ORDER — SCOLOPAMINE TRANSDERMAL SYSTEM 1 MG/1
1 PATCH, EXTENDED RELEASE TRANSDERMAL
Status: DISCONTINUED | OUTPATIENT
Start: 2020-01-01 | End: 2020-01-01 | Stop reason: HOSPADM

## 2020-01-01 RX ORDER — PROMETHAZINE HYDROCHLORIDE 25 MG/1
25 TABLET ORAL EVERY 4 HOURS PRN
Status: DISCONTINUED | OUTPATIENT
Start: 2020-01-01 | End: 2020-01-01 | Stop reason: HOSPADM

## 2020-01-01 RX ORDER — DIPHENOXYLATE HYDROCHLORIDE AND ATROPINE SULFATE 2.5; .025 MG/1; MG/1
1 TABLET ORAL
Status: DISCONTINUED | OUTPATIENT
Start: 2020-01-01 | End: 2020-01-01 | Stop reason: HOSPADM

## 2020-01-01 RX ORDER — METHYLPREDNISOLONE SODIUM SUCCINATE 40 MG/ML
40 INJECTION, POWDER, LYOPHILIZED, FOR SOLUTION INTRAMUSCULAR; INTRAVENOUS EVERY 12 HOURS
Status: DISCONTINUED | OUTPATIENT
Start: 2020-01-01 | End: 2020-01-01

## 2020-01-01 RX ORDER — ONDANSETRON 4 MG/1
4 TABLET, FILM COATED ORAL EVERY 6 HOURS PRN
Status: DISCONTINUED | OUTPATIENT
Start: 2020-01-01 | End: 2020-01-01

## 2020-01-01 RX ORDER — ROPINIROLE 1 MG/1
1 TABLET, FILM COATED ORAL NIGHTLY
COMMUNITY

## 2020-01-01 RX ORDER — LORAZEPAM 2 MG/ML
1 INJECTION INTRAMUSCULAR
Status: DISCONTINUED | OUTPATIENT
Start: 2020-01-01 | End: 2020-01-01

## 2020-01-01 RX ORDER — PROMETHAZINE HYDROCHLORIDE 25 MG/1
25 SUPPOSITORY RECTAL EVERY 4 HOURS PRN
Status: DISCONTINUED | OUTPATIENT
Start: 2020-01-01 | End: 2020-01-01 | Stop reason: HOSPADM

## 2020-01-01 RX ORDER — MORPHINE SULFATE 20 MG/ML
5 SOLUTION ORAL
Status: DISCONTINUED | OUTPATIENT
Start: 2020-01-01 | End: 2020-01-01 | Stop reason: HOSPADM

## 2020-01-01 RX ORDER — ACETAMINOPHEN 650 MG/1
650 SUPPOSITORY RECTAL EVERY 4 HOURS PRN
Status: DISCONTINUED | OUTPATIENT
Start: 2020-01-01 | End: 2020-01-01

## 2020-01-01 RX ORDER — ALBUTEROL SULFATE 90 UG/1
6 AEROSOL, METERED RESPIRATORY (INHALATION)
Status: DISCONTINUED | OUTPATIENT
Start: 2020-01-01 | End: 2020-01-01

## 2020-01-01 RX ORDER — PROMETHAZINE HYDROCHLORIDE 25 MG/ML
25 INJECTION, SOLUTION INTRAMUSCULAR; INTRAVENOUS EVERY 4 HOURS PRN
Status: DISCONTINUED | OUTPATIENT
Start: 2020-01-01 | End: 2020-01-01 | Stop reason: HOSPADM

## 2020-01-01 RX ORDER — METOPROLOL SUCCINATE 25 MG/1
25 TABLET, EXTENDED RELEASE ORAL 2 TIMES DAILY
COMMUNITY

## 2020-01-01 RX ORDER — ACETAMINOPHEN 325 MG/1
650 TABLET ORAL EVERY 4 HOURS PRN
Status: DISCONTINUED | OUTPATIENT
Start: 2020-01-01 | End: 2020-01-01 | Stop reason: HOSPADM

## 2020-01-01 RX ORDER — LORAZEPAM 2 MG/ML
0.5 INJECTION INTRAMUSCULAR
Status: DISCONTINUED | OUTPATIENT
Start: 2020-01-01 | End: 2020-01-01 | Stop reason: HOSPADM

## 2020-01-01 RX ORDER — PROMETHAZINE HYDROCHLORIDE 25 MG/ML
12.5 INJECTION, SOLUTION INTRAMUSCULAR; INTRAVENOUS EVERY 4 HOURS PRN
Status: DISCONTINUED | OUTPATIENT
Start: 2020-01-01 | End: 2020-01-01 | Stop reason: HOSPADM

## 2020-01-01 RX ORDER — DILTIAZEM HCL 90 MG
90 TABLET ORAL 4 TIMES DAILY
COMMUNITY

## 2020-01-01 RX ORDER — LORAZEPAM 2 MG/ML
0.5 INJECTION INTRAMUSCULAR ONCE
Status: COMPLETED | OUTPATIENT
Start: 2020-01-01 | End: 2020-01-01

## 2020-01-01 RX ORDER — LEVETIRACETAM 5 MG/ML
500 INJECTION INTRAVASCULAR EVERY 12 HOURS SCHEDULED
Status: DISCONTINUED | OUTPATIENT
Start: 2020-01-01 | End: 2020-01-01

## 2020-01-01 RX ORDER — WARFARIN SODIUM 3 MG/1
6 TABLET ORAL SEE ADMIN INSTRUCTIONS
COMMUNITY

## 2020-01-01 RX ORDER — ALBUTEROL SULFATE 90 UG/1
2 AEROSOL, METERED RESPIRATORY (INHALATION)
COMMUNITY

## 2020-01-01 RX ORDER — DIPHENHYDRAMINE HCL 25 MG
25 CAPSULE ORAL EVERY 6 HOURS PRN
Status: DISCONTINUED | OUTPATIENT
Start: 2020-01-01 | End: 2020-01-01 | Stop reason: HOSPADM

## 2020-01-01 RX ORDER — PROMETHAZINE HYDROCHLORIDE 6.25 MG/5ML
12.5 SYRUP ORAL EVERY 4 HOURS PRN
Status: DISCONTINUED | OUTPATIENT
Start: 2020-01-01 | End: 2020-01-01 | Stop reason: HOSPADM

## 2020-01-01 RX ORDER — ESCITALOPRAM OXALATE 20 MG/1
20 TABLET ORAL DAILY
COMMUNITY

## 2020-01-01 RX ORDER — MORPHINE SULFATE 4 MG/ML
INJECTION, SOLUTION INTRAMUSCULAR; INTRAVENOUS
Status: COMPLETED
Start: 2020-01-01 | End: 2020-01-01

## 2020-01-01 RX ORDER — NOREPINEPHRINE BIT/0.9 % NACL 8 MG/250ML
.02-.3 INFUSION BOTTLE (ML) INTRAVENOUS
Status: DISCONTINUED | OUTPATIENT
Start: 2020-01-01 | End: 2020-01-01

## 2020-01-01 RX ORDER — LORAZEPAM 2 MG/ML
2 CONCENTRATE ORAL
Status: DISCONTINUED | OUTPATIENT
Start: 2020-01-01 | End: 2020-01-01 | Stop reason: HOSPADM

## 2020-01-01 RX ORDER — DIGOXIN 250 MCG
250 TABLET ORAL
COMMUNITY

## 2020-01-01 RX ORDER — LORAZEPAM 2 MG/ML
2 INJECTION INTRAMUSCULAR
Status: DISCONTINUED | OUTPATIENT
Start: 2020-01-01 | End: 2020-01-01 | Stop reason: HOSPADM

## 2020-01-01 RX ORDER — PREDNISONE 1 MG/1
5-10 TABLET ORAL DAILY
COMMUNITY

## 2020-01-01 RX ORDER — WARFARIN SODIUM 3 MG/1
9 TABLET ORAL SEE ADMIN INSTRUCTIONS
COMMUNITY

## 2020-01-01 RX ORDER — SODIUM CHLORIDE 0.9 % (FLUSH) 0.9 %
10 SYRINGE (ML) INJECTION EVERY 12 HOURS SCHEDULED
Status: DISCONTINUED | OUTPATIENT
Start: 2020-01-01 | End: 2020-01-01

## 2020-01-01 RX ORDER — DEXTROSE MONOHYDRATE 25 G/50ML
50 INJECTION, SOLUTION INTRAVENOUS ONCE
Status: COMPLETED | OUTPATIENT
Start: 2020-01-01 | End: 2020-01-01

## 2020-01-01 RX ORDER — LORAZEPAM 2 MG/ML
INJECTION INTRAMUSCULAR
Status: COMPLETED
Start: 2020-01-01 | End: 2020-01-01

## 2020-01-01 RX ORDER — NICOTINE POLACRILEX 4 MG
15 LOZENGE BUCCAL
Status: DISCONTINUED | OUTPATIENT
Start: 2020-01-01 | End: 2020-01-01

## 2020-01-01 RX ORDER — DEXTROSE MONOHYDRATE 25 G/50ML
INJECTION, SOLUTION INTRAVENOUS
Status: COMPLETED
Start: 2020-01-01 | End: 2020-01-01

## 2020-01-01 RX ORDER — ONDANSETRON 2 MG/ML
4 INJECTION INTRAMUSCULAR; INTRAVENOUS EVERY 6 HOURS PRN
Status: DISCONTINUED | OUTPATIENT
Start: 2020-01-01 | End: 2020-01-01

## 2020-01-01 RX ORDER — MORPHINE SULFATE 20 MG/ML
20 SOLUTION ORAL
Status: DISCONTINUED | OUTPATIENT
Start: 2020-01-01 | End: 2020-01-01 | Stop reason: HOSPADM

## 2020-01-01 RX ORDER — LORAZEPAM 2 MG/ML
2 INJECTION INTRAMUSCULAR ONCE
Status: DISCONTINUED | OUTPATIENT
Start: 2020-01-01 | End: 2020-01-01

## 2020-01-01 RX ORDER — METHYLPREDNISOLONE SODIUM SUCCINATE 125 MG/2ML
125 INJECTION, POWDER, LYOPHILIZED, FOR SOLUTION INTRAMUSCULAR; INTRAVENOUS ONCE
Status: COMPLETED | OUTPATIENT
Start: 2020-01-01 | End: 2020-01-01

## 2020-01-01 RX ORDER — MORPHINE SULFATE 2 MG/ML
4 INJECTION, SOLUTION INTRAMUSCULAR; INTRAVENOUS
Status: DISCONTINUED | OUTPATIENT
Start: 2020-01-01 | End: 2020-01-01 | Stop reason: HOSPADM

## 2020-01-01 RX ORDER — METHYLPREDNISOLONE SODIUM SUCCINATE 40 MG/ML
20 INJECTION, POWDER, LYOPHILIZED, FOR SOLUTION INTRAMUSCULAR; INTRAVENOUS EVERY 12 HOURS
Status: DISCONTINUED | OUTPATIENT
Start: 2020-01-01 | End: 2020-01-01

## 2020-01-01 RX ORDER — PROMETHAZINE HYDROCHLORIDE 6.25 MG/5ML
25 SYRUP ORAL EVERY 4 HOURS PRN
Status: DISCONTINUED | OUTPATIENT
Start: 2020-01-01 | End: 2020-01-01 | Stop reason: HOSPADM

## 2020-01-01 RX ORDER — PROMETHAZINE HYDROCHLORIDE 25 MG/1
12.5 TABLET ORAL EVERY 4 HOURS PRN
Status: DISCONTINUED | OUTPATIENT
Start: 2020-01-01 | End: 2020-01-01 | Stop reason: HOSPADM

## 2020-01-01 RX ORDER — LORAZEPAM 2 MG/ML
1 INJECTION INTRAMUSCULAR
Status: DISCONTINUED | OUTPATIENT
Start: 2020-01-01 | End: 2020-01-01 | Stop reason: HOSPADM

## 2020-01-01 RX ADMIN — PROPOFOL 35 MCG/KG/MIN: 10 INJECTION, EMULSION INTRAVENOUS at 03:26

## 2020-01-01 RX ADMIN — SODIUM CHLORIDE, PRESERVATIVE FREE 10 ML: 5 INJECTION INTRAVENOUS at 09:24

## 2020-01-01 RX ADMIN — SODIUM CHLORIDE, PRESERVATIVE FREE 10 ML: 5 INJECTION INTRAVENOUS at 21:37

## 2020-01-01 RX ADMIN — PROPOFOL 50 MCG/KG/MIN: 10 INJECTION, EMULSION INTRAVENOUS at 18:25

## 2020-01-01 RX ADMIN — FENTANYL CITRATE 100 MCG: 50 INJECTION, SOLUTION INTRAMUSCULAR; INTRAVENOUS at 06:24

## 2020-01-01 RX ADMIN — TAZOBACTAM SODIUM AND PIPERACILLIN SODIUM 3.38 G: 375; 3 INJECTION, SOLUTION INTRAVENOUS at 08:55

## 2020-01-01 RX ADMIN — METHYLPREDNISOLONE SODIUM SUCCINATE 40 MG: 40 INJECTION, POWDER, FOR SOLUTION INTRAMUSCULAR; INTRAVENOUS at 22:14

## 2020-01-01 RX ADMIN — ACETAMINOPHEN 650 MG: 650 SUPPOSITORY RECTAL at 06:07

## 2020-01-01 RX ADMIN — PROPOFOL 40 MCG/KG/MIN: 10 INJECTION, EMULSION INTRAVENOUS at 04:05

## 2020-01-01 RX ADMIN — FENTANYL CITRATE 100 MCG: 50 INJECTION, SOLUTION INTRAMUSCULAR; INTRAVENOUS at 20:59

## 2020-01-01 RX ADMIN — ALBUTEROL SULFATE 6 PUFF: 90 AEROSOL, METERED RESPIRATORY (INHALATION) at 00:03

## 2020-01-01 RX ADMIN — VANCOMYCIN HYDROCHLORIDE 1250 MG: 10 INJECTION, POWDER, LYOPHILIZED, FOR SOLUTION INTRAVENOUS at 09:23

## 2020-01-01 RX ADMIN — ALBUTEROL SULFATE 6 PUFF: 90 AEROSOL, METERED RESPIRATORY (INHALATION) at 23:10

## 2020-01-01 RX ADMIN — VANCOMYCIN HYDROCHLORIDE 1500 MG: 10 INJECTION, POWDER, LYOPHILIZED, FOR SOLUTION INTRAVENOUS at 21:27

## 2020-01-01 RX ADMIN — SODIUM CHLORIDE, PRESERVATIVE FREE 10 ML: 5 INJECTION INTRAVENOUS at 08:53

## 2020-01-01 RX ADMIN — INSULIN HUMAN 2 UNITS: 100 INJECTION, SOLUTION PARENTERAL at 12:42

## 2020-01-01 RX ADMIN — FAMOTIDINE 20 MG: 10 INJECTION INTRAVENOUS at 08:55

## 2020-01-01 RX ADMIN — ALBUTEROL SULFATE 6 PUFF: 90 AEROSOL, METERED RESPIRATORY (INHALATION) at 07:08

## 2020-01-01 RX ADMIN — METOPROLOL TARTRATE 5 MG: 5 INJECTION, SOLUTION INTRAVENOUS at 07:41

## 2020-01-01 RX ADMIN — ALBUTEROL SULFATE 6 PUFF: 90 AEROSOL, METERED RESPIRATORY (INHALATION) at 07:59

## 2020-01-01 RX ADMIN — ENOXAPARIN SODIUM 70 MG: 80 INJECTION SUBCUTANEOUS at 11:09

## 2020-01-01 RX ADMIN — METHYLPREDNISOLONE SODIUM SUCCINATE 40 MG: 40 INJECTION, POWDER, FOR SOLUTION INTRAMUSCULAR; INTRAVENOUS at 06:07

## 2020-01-01 RX ADMIN — ALBUTEROL SULFATE 6 PUFF: 90 AEROSOL, METERED RESPIRATORY (INHALATION) at 19:51

## 2020-01-01 RX ADMIN — LORAZEPAM 0.5 MG: 2 INJECTION, SOLUTION INTRAMUSCULAR; INTRAVENOUS at 17:30

## 2020-01-01 RX ADMIN — FAMOTIDINE 20 MG: 10 INJECTION INTRAVENOUS at 08:33

## 2020-01-01 RX ADMIN — LORAZEPAM 0.5 MG: 2 INJECTION INTRAMUSCULAR; INTRAVENOUS at 12:57

## 2020-01-01 RX ADMIN — DEXTROSE MONOHYDRATE 25 G: 25 INJECTION, SOLUTION INTRAVENOUS at 19:19

## 2020-01-01 RX ADMIN — INSULIN HUMAN 10 UNITS: 100 INJECTION, SOLUTION PARENTERAL at 18:09

## 2020-01-01 RX ADMIN — ALBUTEROL SULFATE 6 PUFF: 90 AEROSOL, METERED RESPIRATORY (INHALATION) at 19:16

## 2020-01-01 RX ADMIN — ALBUTEROL SULFATE 6 PUFF: 90 AEROSOL, METERED RESPIRATORY (INHALATION) at 23:09

## 2020-01-01 RX ADMIN — LORAZEPAM 2 MG: 2 INJECTION INTRAMUSCULAR; INTRAVENOUS at 01:57

## 2020-01-01 RX ADMIN — EPINEPHRINE 1 MG: 0.1 INJECTION, SOLUTION ENDOTRACHEAL; INTRACARDIAC; INTRAVENOUS at 16:59

## 2020-01-01 RX ADMIN — METHYLPREDNISOLONE SODIUM SUCCINATE 125 MG: 125 INJECTION, POWDER, FOR SOLUTION INTRAMUSCULAR; INTRAVENOUS at 18:21

## 2020-01-01 RX ADMIN — SCOPALAMINE 1 PATCH: 1 PATCH, EXTENDED RELEASE TRANSDERMAL at 17:20

## 2020-01-01 RX ADMIN — SODIUM CHLORIDE, PRESERVATIVE FREE 10 ML: 5 INJECTION INTRAVENOUS at 07:43

## 2020-01-01 RX ADMIN — VALPROATE SODIUM 500 MG: 100 INJECTION, SOLUTION INTRAVENOUS at 17:17

## 2020-01-01 RX ADMIN — ACETAMINOPHEN 650 MG: 650 SUPPOSITORY RECTAL at 01:48

## 2020-01-01 RX ADMIN — PROPOFOL 35 MCG/KG/MIN: 10 INJECTION, EMULSION INTRAVENOUS at 04:18

## 2020-01-01 RX ADMIN — GLYCOPYRROLATE 0.2 MG: 0.2 INJECTION, SOLUTION INTRAMUSCULAR; INTRAVITREAL at 13:19

## 2020-01-01 RX ADMIN — METOPROLOL TARTRATE 2.5 MG: 5 INJECTION, SOLUTION INTRAVENOUS at 08:52

## 2020-01-01 RX ADMIN — FAMOTIDINE 20 MG: 10 INJECTION INTRAVENOUS at 20:35

## 2020-01-01 RX ADMIN — CEFTRIAXONE SODIUM 1 G: 1 INJECTION, SOLUTION INTRAVENOUS at 01:05

## 2020-01-01 RX ADMIN — VALPROATE SODIUM 500 MG: 100 INJECTION, SOLUTION INTRAVENOUS at 07:41

## 2020-01-01 RX ADMIN — VALPROATE SODIUM 500 MG: 100 INJECTION, SOLUTION INTRAVENOUS at 08:53

## 2020-01-01 RX ADMIN — METOPROLOL TARTRATE 5 MG: 5 INJECTION, SOLUTION INTRAVENOUS at 21:25

## 2020-01-01 RX ADMIN — PROPOFOL 50 MCG/KG/MIN: 10 INJECTION, EMULSION INTRAVENOUS at 22:09

## 2020-01-01 RX ADMIN — ALBUTEROL SULFATE 6 PUFF: 90 AEROSOL, METERED RESPIRATORY (INHALATION) at 19:23

## 2020-01-01 RX ADMIN — TAZOBACTAM SODIUM AND PIPERACILLIN SODIUM 3.38 G: 375; 3 INJECTION, SOLUTION INTRAVENOUS at 02:07

## 2020-01-01 RX ADMIN — LORAZEPAM 0.5 MG: 2 INJECTION INTRAMUSCULAR at 12:57

## 2020-01-01 RX ADMIN — PROPOFOL 35 MCG/KG/MIN: 10 INJECTION, EMULSION INTRAVENOUS at 09:44

## 2020-01-01 RX ADMIN — HYDROMORPHONE HYDROCHLORIDE 1 MG: 1 INJECTION, SOLUTION INTRAMUSCULAR; INTRAVENOUS; SUBCUTANEOUS at 13:20

## 2020-01-01 RX ADMIN — PROPOFOL 50 MCG/KG/MIN: 10 INJECTION, EMULSION INTRAVENOUS at 12:30

## 2020-01-01 RX ADMIN — METHYLPREDNISOLONE SODIUM SUCCINATE 40 MG: 40 INJECTION, POWDER, FOR SOLUTION INTRAMUSCULAR; INTRAVENOUS at 07:30

## 2020-01-01 RX ADMIN — LORAZEPAM 0.5 MG: 2 INJECTION INTRAMUSCULAR; INTRAVENOUS at 17:27

## 2020-01-01 RX ADMIN — METOPROLOL TARTRATE 2.5 MG: 5 INJECTION, SOLUTION INTRAVENOUS at 12:31

## 2020-01-01 RX ADMIN — GLYCOPYRROLATE 0.4 MG: 0.2 INJECTION, SOLUTION INTRAMUSCULAR; INTRAVITREAL at 16:19

## 2020-01-01 RX ADMIN — INSULIN HUMAN 2 UNITS: 100 INJECTION, SOLUTION PARENTERAL at 05:24

## 2020-01-01 RX ADMIN — SODIUM CHLORIDE, PRESERVATIVE FREE 10 ML: 5 INJECTION INTRAVENOUS at 21:25

## 2020-01-01 RX ADMIN — VALPROATE SODIUM 500 MG: 100 INJECTION, SOLUTION INTRAVENOUS at 00:12

## 2020-01-01 RX ADMIN — INSULIN HUMAN 2 UNITS: 100 INJECTION, SOLUTION PARENTERAL at 17:05

## 2020-01-01 RX ADMIN — FENTANYL CITRATE 100 MCG: 50 INJECTION, SOLUTION INTRAMUSCULAR; INTRAVENOUS at 00:41

## 2020-01-01 RX ADMIN — PROPOFOL 50 MCG/KG/MIN: 10 INJECTION, EMULSION INTRAVENOUS at 14:04

## 2020-01-01 RX ADMIN — FENTANYL CITRATE 100 MCG: 50 INJECTION, SOLUTION INTRAMUSCULAR; INTRAVENOUS at 06:23

## 2020-01-01 RX ADMIN — HYDROMORPHONE HYDROCHLORIDE 1 MG: 1 INJECTION, SOLUTION INTRAMUSCULAR; INTRAVENOUS; SUBCUTANEOUS at 17:20

## 2020-01-01 RX ADMIN — MORPHINE SULFATE 4 MG: 2 INJECTION, SOLUTION INTRAMUSCULAR; INTRAVENOUS at 14:47

## 2020-01-01 RX ADMIN — ROCURONIUM BROMIDE 78 MG: 50 INJECTION, SOLUTION INTRAVENOUS at 18:45

## 2020-01-01 RX ADMIN — PROPOFOL 35 MCG/KG/MIN: 10 INJECTION, EMULSION INTRAVENOUS at 13:49

## 2020-01-01 RX ADMIN — METHYLPREDNISOLONE SODIUM SUCCINATE 40 MG: 40 INJECTION, POWDER, FOR SOLUTION INTRAMUSCULAR; INTRAVENOUS at 17:50

## 2020-01-01 RX ADMIN — LORAZEPAM 1 MG: 2 INJECTION INTRAMUSCULAR; INTRAVENOUS at 13:20

## 2020-01-01 RX ADMIN — LORAZEPAM 1 MG: 2 INJECTION INTRAMUSCULAR; INTRAVENOUS at 17:20

## 2020-01-01 RX ADMIN — CALCIUM GLUCONATE 1 G: 98 INJECTION, SOLUTION INTRAVENOUS at 18:03

## 2020-01-01 RX ADMIN — ACETAMINOPHEN 650 MG: 650 SUPPOSITORY RECTAL at 15:31

## 2020-01-01 RX ADMIN — SODIUM CHLORIDE 1000 ML: 9 INJECTION, SOLUTION INTRAVENOUS at 17:08

## 2020-01-01 RX ADMIN — ALBUTEROL SULFATE 6 PUFF: 90 AEROSOL, METERED RESPIRATORY (INHALATION) at 11:30

## 2020-01-01 RX ADMIN — PROPOFOL 35 MCG/KG/MIN: 10 INJECTION, EMULSION INTRAVENOUS at 00:56

## 2020-01-01 RX ADMIN — SODIUM BICARBONATE 50 MEQ: 84 INJECTION, SOLUTION INTRAVENOUS at 18:04

## 2020-01-01 RX ADMIN — SODIUM CHLORIDE, PRESERVATIVE FREE 10 ML: 5 INJECTION INTRAVENOUS at 20:35

## 2020-01-01 RX ADMIN — ALBUTEROL SULFATE 6 PUFF: 90 AEROSOL, METERED RESPIRATORY (INHALATION) at 11:02

## 2020-01-01 RX ADMIN — PROPOFOL 40 MCG/KG/MIN: 10 INJECTION, EMULSION INTRAVENOUS at 19:09

## 2020-01-01 RX ADMIN — CEFTRIAXONE SODIUM 1 G: 1 INJECTION, SOLUTION INTRAVENOUS at 01:09

## 2020-01-01 RX ADMIN — TAZOBACTAM SODIUM AND PIPERACILLIN SODIUM 3.38 G: 375; 3 INJECTION, SOLUTION INTRAVENOUS at 08:33

## 2020-01-01 RX ADMIN — DEXTROSE: 50 INJECTION, SOLUTION INTRAVENOUS at 19:30

## 2020-01-01 RX ADMIN — MORPHINE SULFATE 4 MG: 2 INJECTION, SOLUTION INTRAMUSCULAR; INTRAVENOUS at 16:20

## 2020-01-01 RX ADMIN — VALPROATE SODIUM 500 MG: 100 INJECTION, SOLUTION INTRAVENOUS at 00:14

## 2020-01-01 RX ADMIN — LORAZEPAM 2 MG: 2 INJECTION INTRAMUSCULAR; INTRAVENOUS at 23:07

## 2020-01-01 RX ADMIN — MORPHINE SULFATE 4 MG: 4 INJECTION INTRAVENOUS at 12:57

## 2020-01-01 RX ADMIN — METOPROLOL TARTRATE 2.5 MG: 5 INJECTION, SOLUTION INTRAVENOUS at 00:56

## 2020-01-01 RX ADMIN — SODIUM BICARBONATE 50 MEQ: 84 INJECTION, SOLUTION INTRAVENOUS at 17:26

## 2020-01-01 RX ADMIN — FAMOTIDINE 20 MG: 10 INJECTION INTRAVENOUS at 14:00

## 2020-01-01 RX ADMIN — METOPROLOL TARTRATE 2.5 MG: 5 INJECTION, SOLUTION INTRAVENOUS at 23:26

## 2020-01-01 RX ADMIN — DEXTROSE MONOHYDRATE 50 ML: 25 INJECTION, SOLUTION INTRAVENOUS at 18:09

## 2020-01-01 RX ADMIN — METOPROLOL TARTRATE 2.5 MG: 5 INJECTION, SOLUTION INTRAVENOUS at 04:59

## 2020-01-01 RX ADMIN — FENTANYL CITRATE 100 MCG: 50 INJECTION, SOLUTION INTRAMUSCULAR; INTRAVENOUS at 11:03

## 2020-01-01 RX ADMIN — DIGOXIN 500 MCG: 0.25 INJECTION INTRAMUSCULAR; INTRAVENOUS at 20:46

## 2020-01-01 RX ADMIN — LEVETIRACETAM 500 MG: 500 INJECTION, SOLUTION INTRAVENOUS at 09:08

## 2020-01-01 RX ADMIN — PROPOFOL 35 MCG/KG/MIN: 10 INJECTION, EMULSION INTRAVENOUS at 19:26

## 2020-01-01 RX ADMIN — LORAZEPAM 2 MG: 2 INJECTION INTRAMUSCULAR; INTRAVENOUS at 13:51

## 2020-01-01 RX ADMIN — LEVETIRACETAM 500 MG: 500 INJECTION, SOLUTION INTRAVENOUS at 22:00

## 2020-01-01 RX ADMIN — LORAZEPAM 2 MG: 2 INJECTION INTRAMUSCULAR; INTRAVENOUS at 08:29

## 2020-01-01 RX ADMIN — ALBUTEROL SULFATE 6 PUFF: 90 AEROSOL, METERED RESPIRATORY (INHALATION) at 07:55

## 2020-01-16 PROBLEM — Z92.89 SUCCESSFUL CARDIOPULMONARY RESUSCITATION: Status: ACTIVE | Noted: 2020-01-01

## 2020-01-16 NOTE — PROGRESS NOTES
Clinical Pharmacy Services: Medication History    Leonor Bhandari is a 64 y.o. female presenting to [facility] for   Chief Complaint   Patient presents with    Cardiac Arrest       She  has a past medical history of A-fib (CMS/[facility]), Anxiety, Asthma, COPD (chronic obstructive pulmonary disease) (CMS/[facility]), Depression, Graves disease, Hyperlipidemia, and Hypertension.    Allergies as of 01/16/2020 - Reviewed 01/16/2020   Allergen Reaction Noted    Ramipril Unknown - High Severity 01/16/2020       Medication information was obtained from: pharmacy  Pharmacy and Phone Number: [facility] [phone]    Prior to Admission Medications       Prescriptions Last Dose Informant Patient Reported? Taking?    albuterol sulfate  (90 Base) MCG/ACT inhaler  Pharmacy Yes Yes    Inhale 2 puffs 4 (Four) Times a Day.    digoxin (LANOXIN) 250 MCG tablet  Pharmacy Yes Yes    Take 250 mcg by mouth Daily.    dilTIAZem (CARDIZEM) 90 MG tablet  Pharmacy Yes Yes    Take 90 mg by mouth 4 (Four) Times a Day. With meals and at bedtime    escitalopram (LEXAPRO) 20 MG tablet  Pharmacy Yes Yes    Take 20 mg by mouth Daily.    metoprolol succinate XL (TOPROL-XL) 25 MG 24 hr tablet  Pharmacy Yes Yes    Take 25 mg by mouth 2 (Two) Times a Day.    predniSONE (DELTASONE) 5 MG tablet  Pharmacy Yes Yes    Take 5-10 mg by mouth Daily.    rOPINIRole (REQUIP) 1 MG tablet  Pharmacy Yes Yes    Take 1 mg by mouth Every Night. Take 1 hour before bedtime.    warfarin (COUMADIN) 3 MG tablet  Pharmacy Yes Yes    Take 9 mg by mouth See Admin Instructions. Patient takes on thursday    warfarin (COUMADIN) 3 MG tablet  Pharmacy Yes Yes    Take 6 mg by mouth See Admin Instructions. Patient takes everyday BUT thursday                Medication notes:     This medication list is complete to the best of my knowledge as of 1/16/2020    Please call if questions.    Niesha Montes [facility]  Medication History Technician  [phone]    1/16/2020 6:13 PM

## 2020-01-16 NOTE — H&P
Group: [facility]         Critical care H&P admission note    Patient Identification:  Leonor Bhandari  64 y.o.  female  1955  [id number]                 CC: Cardiac arrest    History of Present Illness:  64-year-old  female who presented in full cardiac arrest via EMS.  I discussed the case with Dr. Lagunas directly in the emergency room.  EMS found her to be awake, alert and using nebulizer when they arrived on the scene.  She suddenly collapsed and had cardiorespiratory arrest at 1626.  She did receive ACLS protocol for cardiac arrest resuscitation and she received epinephrine twice on route.  Upon arrival endotracheal tube was inserted by Dr. Lagunas.  She is now exhibiting repetitive tonic-clonic movements of her right upper extremity and her face as well as deviation of her eyes, suggesting seizures.  She has profound metabolic acidosis on her ABG.  Her chest x-ray was visualized and it shows right upper paramediastinal widening.  I discussed the case with her son in the emergency room at at this time he would like full code and entire scope of medical therapy for now.  Old records reviewed.  Discharge summary dictated by Dr. Esquivel in March 2009 was reviewed.    Review of Systems   Unable to perform ROS: Intubated       Past Medical History:  Past Medical History:   Diagnosis Date   • A-fib (CMS/[facility])    • Anxiety    • Asthma    • COPD (chronic obstructive pulmonary disease) (CMS/[facility])    • Depression    • Graves disease    • Hyperlipidemia    • Hypertension    • Hyperthyroidism        Past Surgical History:  No past surgical history on file.     Home Meds:  Reviewed and reconciled    Allergies:  Allergies   Allergen Reactions   • Ramipril Unknown - High Severity       Social History:   Social History     Socioeconomic History   • Marital status:      Spouse name: Not on file   • Number of children: Not on file   • Years of education: Not on file   • Highest education level: Not  on file       Family History:  Negative for lung disease    Physical Exam:  /69   Pulse 94   Temp 96 °F (35.6 °C) (Rectal)   Resp (!) 30   Wt 78 kg (171 lb 15.3 oz)   SpO2 97%  There is no height or weight on file to calculate BMI. 97% 78 kg (171 lb 15.3 oz)  Physical Exam   Constitutional: She appears distressed.   HENT:   Right Ear: External ear normal.   Left Ear: External ear normal.   Nose: Nose normal.   Oral exam shows endotracheal tube in good position   Eyes: Conjunctivae are normal. Right eye exhibits no discharge. Left eye exhibits no discharge.   Mild proprtosis both eyes. She has deviation of her eyes to the right.   Neck: No JVD present. No tracheal deviation present. No thyromegaly present.   Cardiovascular: Normal rate, regular rhythm and normal heart sounds.   No murmur heard.  She has 1+ ankle edema   Pulmonary/Chest:   She is using accessory muscles, she is having tachypnea overbreathing the ventilator.  She has bilateral rhonchi and wheezes with prolonged expiratory time   Abdominal: Soft. She exhibits no mass. There is no tenderness. There is no rebound.   No liver or spleen enlargement, palpated   Musculoskeletal: She exhibits no deformity.   Neurological:   Patient is unresponsive, intubated and having tonic-clonic jerking of her head, neck and right upper extremity as well as her facial muscles with gaze deviation to the right, suggesting seizures   Skin: Skin is warm. No rash noted. She is diaphoretic.   No palpable nodules   Psychiatric:   Cannot be obtained because patient is unresponsive       LABS:  Lab Results   Component Value Date    CALCIUM 10.6 (H) 01/16/2020     Results from last 7 days   Lab Units 01/16/20  1721   MAGNESIUM mg/dL 2.8*   SODIUM mmol/L 138   POTASSIUM mmol/L 6.4*   CHLORIDE mmol/L 95*   CO2 mmol/L 17.3*   BUN mg/dL 6*   CREATININE mg/dL 0.70   GLUCOSE mg/dL 173*   CALCIUM mg/dL 10.6*   WBC 10*3/mm3 21.11*   HEMOGLOBIN g/dL 14.5   PLATELETS 10*3/mm3 390    ALT (SGPT) U/L 24   AST (SGOT) U/L 54*   PROBNP pg/mL 1,747.0*     Lab Results   Component Value Date    TROPONINT <0.010 01/16/2020     Results from last 7 days   Lab Units 01/16/20  1721   TROPONIN T ng/mL <0.010             Results from last 7 days   Lab Units 01/16/20  1722   PH, ARTERIAL pH units 6.690*   PCO2, ARTERIAL mm Hg 105.5*   PO2 ART mm Hg 346.7*   MODALITY  Adult Vent   O2 SATURATION CALC % 99.5*         Results from last 7 days   Lab Units 01/16/20  1721   INR  2.75*         Lab Results   Component Value Date    TSH <0.005 (L) 01/16/2020     CrCl cannot be calculated (Unknown ideal weight.).         Imaging: I personally visualized the images of chest x-ray showing endotracheal tube in good position.  There is some wound mediastinal widening with some right upper mediastinal and peritracheal opacity.  There is also prominent interstitial opacities suggesting pulmonary edema bilaterally.  Pulmonary infiltrate on the right side in the left side cannot be excluded.    EKG: I directly visualized EKG showing sinus rhythm with wide QRS with seems to be incomplete bundle branch block      Assessment:  Resuscitated cardiac arrest  Acute respiratory failure  Probable pulmonary edema  Seizures  Probable hypoxic brain injury  Probable COPD exacerbation  Severe respiratory acidosis  Probable acute heart failure  Hyperkalemia  Hypercalcemia  Hyperthyroidism  Elevated INR      Recommendations:  Patient critically ill.  She seems to have suffered hypoxic brain injury during cardiac arrest.  She is now having seizures.  We will admit to ICU.  Prognosis is very guarded.  I spoke to son extensively in the emergency room and he wants full code, full scope of therapy.  Start propofol for seizure suppression.  Give Ativan IV for seizure suppression.  Start fentanyl for pain control on ventilator.  Obtain CT scan of the head and chest.  Evaluate right upper paramediastinal opacity seen on chest x-ray  Start empiric  antibiotics for possible pneumonia.  Start IV Solu-Medrol for COPD exacerbation and albuterol bronchodilator.  Continue adjusting mechanical ventilation.  Avoid fluid bolus at this time since patient may be in heart failure.  Try low-dose diuretics.  Add IV pressors if patient becomes hypotensive.  Repeat IV bicarbonate, insulin and dextrose to control hyperkalemia.  Recheck basic metabolic panel within 2 hours.    Total critical care time 45 minutes excluding any separately billable procedure time          Ajit Caban MD  1/16/2020  6:31 PM      Much of this encounter note is an electronic transcription/translation of spoken language to printed text using Dragon Software.

## 2020-01-16 NOTE — ED PROVIDER NOTES
EMERGENCY DEPARTMENT ENCOUNTER    CHIEF COMPLAINT  Chief Complaint: Cardiac Arrest  History given by: EMS  History limited by: Acuity of condition  Room Number: N341/1  PMD: Reyes, Miriam Mercado, MD      HPI:  Pt is a 64 y.o. female with hx of lung disease who presents via EMS in full arrest.  Per EMS, she was SOA and using nebulizers when they arrived on scene.  EMS witnessed her go into full respiratory arrest at 1626.  She had 2 rounds of Epinephrine while en route and had IO and LMA placed.        Location of Arrest - home  Witnessed Arrest - yes by EMS  Bystander CPR - yes  Time of Collapse - 1626  Time CPR Initiated - 1626  First Medical Professional on Scene - EMS   AED Used - No   Initial Cardiac Rhythm -afib  ROSC in Field - no  Time of Arrival to [facility] ED - 1656  STEMI - no  Treatment Prior to Arrival - LMA, CPR and 2 rounds of EPI by EMS      MEDICAL RECORD REVIEW:  Pt was admitted to the ICU from 12/8/2019 to 12/15/2019 with acute respiratory failure and afib.        PAST MEDICAL HISTORY  Active Ambulatory Problems     Diagnosis Date Noted   • No Active Ambulatory Problems     Resolved Ambulatory Problems     Diagnosis Date Noted   • No Resolved Ambulatory Problems     Past Medical History:   Diagnosis Date   • A-fib (CMS/[facility])    • Anxiety    • Asthma    • COPD (chronic obstructive pulmonary disease) (CMS/[facility])    • Depression    • Graves disease    • Hyperlipidemia    • Hypertension    • Hyperthyroidism        PAST SURGICAL HISTORY  No past surgical history on file.    FAMILY HISTORY  No family history on file.    SOCIAL HISTORY  Social History     Socioeconomic History   • Marital status:      Spouse name: Not on file   • Number of children: Not on file   • Years of education: Not on file   • Highest education level: Not on file       ALLERGIES  Ramipril    REVIEW OF SYSTEMS  Review of Systems   Reason unable to perform ROS: Pt condition.       PHYSICAL EXAM  ED Triage Vitals   Temp Pulse Resp BP  SpO2   -- -- -- -- --      Temp src Heart Rate Source Patient Position BP Location FiO2 (%)   -- -- -- -- --       Physical Exam   Constitutional:   Pt is intubated and unresponsive   HENT:   Head: Normocephalic and atraumatic.   Eyes:   pupils are fixed and dilated   Cardiovascular:   No heart sounds   Pulmonary/Chest:   No respiratory effort   Abdominal: Soft. She exhibits no distension. Bowel sounds are absent. There is no tenderness. There is no rebound and no guarding.   Musculoskeletal:   1+ pedal edema bilaterally   Skin: Skin is warm and dry.       LAB RESULTS  Lab Results (last 24 hours)     Procedure Component Value Units Date/Time    CBC & Differential [[id number]] Collected:  01/16/20 1721    Specimen:  Blood Updated:  01/16/20 1811    Narrative:       The following orders were created for panel order CBC & Differential.  Procedure                               Abnormality         Status                     ---------                               -----------         ------                     CBC Auto Differential[[id number]]        Abnormal            Final result                 Please view results for these tests on the individual orders.    Comprehensive Metabolic Panel [[id number]]  (Abnormal) Collected:  01/16/20 1721    Specimen:  Blood Updated:  01/16/20 1757     Glucose 173 mg/dL      BUN 6 mg/dL      Creatinine 0.70 mg/dL      Sodium 138 mmol/L      Potassium 6.4 mmol/L      Chloride 95 mmol/L      CO2 17.3 mmol/L      Calcium 10.6 mg/dL      Total Protein 7.3 g/dL      Albumin 3.60 g/dL      ALT (SGPT) 24 U/L      AST (SGOT) 54 U/L      Comment: Specimen hemolyzed.  Results may be affected.        Alkaline Phosphatase 135 U/L      Total Bilirubin 0.2 mg/dL      eGFR Non African Amer 85 mL/min/1.73      Globulin 3.7 gm/dL      A/G Ratio 1.0 g/dL      BUN/Creatinine Ratio 8.6     Anion Gap 25.7 mmol/L     Narrative:       GFR Normal >60  Chronic Kidney Disease <60  Kidney Failure <15       Protime-INR [[id number]]  (Abnormal) Collected:  01/16/20 1721    Specimen:  Blood Updated:  01/16/20 1744     Protime 28.8 Seconds      INR 2.75    BNP [[id number]]  (Abnormal) Collected:  01/16/20 1721    Specimen:  Blood Updated:  01/16/20 1800     proBNP 1,747.0 pg/mL     Narrative:       Among patients with dyspnea, NT-proBNP is highly sensitive for the detection of acute congestive heart failure. In addition NT-proBNP of <300 pg/ml effectively rules out acute congestive heart failure with 99% negative predictive value.    Results may be falsely decreased if patient taking Biotin.      Troponin [[id number]]  (Normal) Collected:  01/16/20 1721    Specimen:  Blood Updated:  01/16/20 1800     Troponin T <0.010 ng/mL     Narrative:       Troponin T Reference Range:  <= 0.03 ng/mL-   Negative for AMI  >0.03 ng/mL-     Abnormal for myocardial necrosis.  Clinicians would have to utilize clinical acumen, EKG, Troponin and serial changes to determine if it is an Acute Myocardial Infarction or myocardial injury due to an underlying chronic condition.       Results may be falsely decreased if patient taking Biotin.      Magnesium [[id number]]  (Abnormal) Collected:  01/16/20 1721    Specimen:  Blood Updated:  01/16/20 1753     Magnesium 2.8 mg/dL     TSH [[id number]]  (Abnormal) Collected:  01/16/20 1721    Specimen:  Blood Updated:  01/16/20 1804     TSH <0.005 uIU/mL     CBC Auto Differential [[id number]]  (Abnormal) Collected:  01/16/20 1721    Specimen:  Blood Updated:  01/16/20 1811     WBC 21.11 10*3/mm3      RBC 5.63 10*6/mm3      Hemoglobin 14.5 g/dL      Hematocrit 47.6 %      MCV 84.5 fL      MCH 25.8 pg      MCHC 30.5 g/dL      RDW 16.4 %      RDW-SD 49.0 fl      MPV 10.3 fL      Platelets 390 10*3/mm3     Digoxin Level [[id number]]  (Normal) Collected:  01/16/20 1721    Specimen:  Blood Updated:  01/16/20 1817     Digoxin 1.20 ng/mL     Narrative:       Results may be falsely increased if patient taking Biotin.       Manual Differential [[id number]]  (Abnormal) Collected:  01/16/20 1721    Specimen:  Blood Updated:  01/16/20 1811     Neutrophil % 46.0 %      Lymphocyte % 6.0 %      Monocyte % 9.0 %      Eosinophil % 2.0 %      Metamyelocyte % 1.0 %      Myelocyte % 3.0 %      Atypical Lymphocyte % 33.0 %      Neutrophils Absolute 9.71 10*3/mm3      Lymphocytes Absolute 1.27 10*3/mm3      Monocytes Absolute 1.90 10*3/mm3      Eosinophils Absolute 0.42 10*3/mm3      RBC Morphology Normal     WBC Morphology Normal     Platelet Morphology Normal    T4, Free [[id number]]  (Abnormal) Collected:  01/16/20 1721    Specimen:  Blood Updated:  01/16/20 1904     Free T4 2.21 ng/dL     Narrative:       Results may be falsely increased if patient taking Biotin.      T3, Free [[id number]]  (Abnormal) Collected:  01/16/20 1721    Specimen:  Blood Updated:  01/16/20 1904     T3, Free 16.60 pg/mL     Narrative:       Results may be falsely increased if patient taking Biotin.      Blood Gas, Arterial [[id number]]  (Abnormal) Collected:  01/16/20 1722    Specimen:  Arterial Blood Updated:  01/16/20 1728     Site Arterial: right radial     Navid's Test Positive     pH, Arterial 6.690 pH units      Comment: Critical:Notify Dr dr tomlinson (16-Jan-20 17:26:15)Read back ok        pCO2, Arterial 105.5 mm Hg      Comment: Critical:Notify Dr dr tomlinson (16-Jan-20 17:26:15)Read back ok        pO2, Arterial 346.7 mm Hg      HCO3, Arterial 12.7 mmol/L      Base Excess, Arterial -26.5 mmol/L      O2 Saturation Calculated 99.5 %      A-a Gradiant 0.5 mmHg      Barometric Pressure for Blood Gas 764.3 mmHg      Modality Adult Vent     FIO2 100 %      Ventilator Mode AC     Set Mech Resp Rate 12     Rate 24 Breaths/minute      PEEP 5    POC Glucose Once [[id number]]  (Abnormal) Collected:  01/16/20 1914    Specimen:  Blood Updated:  01/16/20 1916     Glucose 67 mg/dL     Blood Gas, Arterial [[id number]]  (Abnormal) Collected:  01/16/20 2031    Specimen:  Arterial Blood  Updated:  01/16/20 2034     Site Arterial: right radial     Navid's Test Positive     pH, Arterial 7.217 pH units      Comment: Critical:Notify GLENNY dai (16-Jan-20 20:33:09)Read back ok        pCO2, Arterial 68.6 mm Hg      Comment: Critical:Notify GLENNY dai (16-Jan-20 20:33:09)Read back ok        pO2, Arterial 86.4 mm Hg      HCO3, Arterial 27.9 mmol/L      Base Excess, Arterial -1.8 mmol/L      O2 Saturation Calculated 93.8 %      A-a Gradiant 0.4 mmHg      Barometric Pressure for Blood Gas 768.6 mmHg      Modality Adult Vent     FIO2 40 %      Ventilator Mode PC     Set Tidal Volume 430     Set Mech Resp Rate 26     Rate 26 Breaths/minute      PEEP 5    Basic Metabolic Panel [[id number]]  (Abnormal) Collected:  01/16/20 2034    Specimen:  Blood Updated:  01/16/20 2135     Glucose 139 mg/dL      BUN 11 mg/dL      Creatinine 0.66 mg/dL      Sodium 145 mmol/L      Potassium 4.1 mmol/L      Chloride 104 mmol/L      CO2 25.8 mmol/L      Calcium 9.2 mg/dL      eGFR Non African Amer 91 mL/min/1.73      BUN/Creatinine Ratio 16.7     Anion Gap 15.2 mmol/L     Narrative:       GFR Normal >60  Chronic Kidney Disease <60  Kidney Failure <15            I ordered the above labs and reviewed the results    RADIOLOGY  CT Head Without Contrast   Final Result       No acute intracranial hemorrhage or hydrocephalus. If there is further   clinical concern, MRI could be considered for further evaluation.       This report was finalized on 1/16/2020 7:12 PM by Dr. Porfirio Hayward M.D.          CT Chest Without Contrast   Final Result      XR Chest 1 View   Final Result   Endotracheal intubation. Cardiomegaly with pulmonary   vascular congestion. Hazy opacity in the left mid and upper lung.   Follow-up recommended.       This report was finalized on 1/16/2020 5:27 PM by Dr. Porfirio Hayward M.D.          XR Chest 1 View    (Results Pending)        I ordered the above noted radiological studies. Interpreted by  radiologist. Discussed with radiologist Dr. Hayward. Reviewed by me in PACS.       PROCEDURES  Intubation  Date/Time: 1/16/2020 5:23 PM  Performed by: Niranjan Lagunas MD  Authorized by: Niranjan Lagunas MD     Consent:     Consent obtained:  Emergent situation  Pre-procedure details:     Patient status:  Unresponsive    Mallampati score:  III  Procedure details:     Preoxygenation:  LISA/LMA    CPR in progress: no      Intubation method:  Oral    Oral intubation technique:  Video-assisted    Laryngoscope blade:  Mac 4    Tube size (mm):  7.0    Tube type:  Cuffed    Number of attempts:  1    Ventilation between attempts: no      Cricoid pressure: yes      Tube visualized through cords: yes    Placement assessment:     ETT to lip:  22cm    Tube secured with:  ETT winn    Breath sounds:  Equal    Placement verification: direct visualization and ETCO2 detector      CXR findings:  ETT in proper place  Critical Care  Performed by: Niranjan Lagunas MD  Authorized by: Niranjan Lagunas MD     Critical care provider statement:     Critical care time (minutes):  60    Critical care time was exclusive of:  Separately billable procedures and treating other patients    Critical care was necessary to treat or prevent imminent or life-threatening deterioration of the following conditions:  Respiratory failure, metabolic crisis, CNS failure or compromise, circulatory failure, cardiac failure and endocrine crisis    Critical care was time spent personally by me on the following activities:  Development of treatment plan with patient or surrogate, discussions with consultants, discussions with primary provider, evaluation of patient's response to treatment, examination of patient, pulse oximetry, ordering and review of radiographic studies, ordering and review of laboratory studies, ordering and performing treatments and interventions, re-evaluation of patient's condition, review of old charts and ventilator management    I  assumed direction of critical care for this patient from another provider in my specialty: no        EKG    EKG time: 1707  Rhythm/Rate: afib/78  RBBB  No Acute Ischemia  Non-Specific ST-T changes    Unchanged compared to prior on 12/8/2019 except for RBBB which is new    Interpreted Contemporaneously by me.  Independently viewed by me      PROGRESS AND CONSULTS     1658:  Gave 1 round of epinephrine.    1700:  Compressions stopped.  Pt has strong pulse.      1702:  Pt intubated.  Breath sounds are equal bilaterally.  Pt still has a good pulse.    1707:  Ordered EKG, labs, and CXR for further evaluation.  Ordered IVF for hydration.      1717: Rechecked the patient.  She is currently on the ventilator with agonal respirations over the ventilator. Patient is stable. BP- 107/71 HR- 62 O2 sat- 94%.     1724:  Rechecked the patient who is still breathing over the ventilator but having a facial spasm.  Pt's pH is 6.69.  Ordered Ativan and sodium bicarbonate.     1758:  Ordered CT head and BMP for further evaluation.  Ordered calcium gluconate, sodium bicarbonate, insulin, and dextrose to treat her hyperkalemia.    1801: Rechecked the patient who is unchanged. Her spasms have worsened but there is no seizure like activity.  BP- 99/66 HR- 68 O2 sat- 98%.  Ordered CT chest for further evaluation.      1810: Received call from Dr. Caban, (Pulmonary) and discussed pt's case.  Dr. Caban reccomends CT head and CT chest and will admit the pt to the ICU.      1816:  Spoke with Dr. Caban at bedside who will assume care of the pt.  Pt started on Propofol drip.    Patient was given rocuronium prior to CT scan in order to obtain images.    MEDICAL DECISION MAKING  Results were reviewed/discussed with the patient and they were also made aware of online access. Pt also made aware that some labs, such as cultures, will not be resulted during ER visit and follow up with PMD is necessary.     MDM  Number of Diagnoses or Management  Options     Amount and/or Complexity of Data Reviewed  Clinical lab tests: reviewed and ordered  Tests in the radiology section of CPT®: reviewed and ordered (See Radiology report)  Tests in the medicine section of CPT®: reviewed and ordered (See EKG procedure note)  Discussion of test results with the performing providers: yes (Dr. Hayward, (Radiology))  Decide to obtain previous medical records or to obtain history from someone other than the patient: yes (EMS)  Review and summarize past medical records: yes  Discuss the patient with other providers: yes (Dr. Caban (pulmonology))  Independent visualization of images, tracings, or specimens: yes           DIAGNOSIS  Final diagnoses:   Cardiac arrest with successful resuscitation (CMS/HCC)   Acute respiratory failure with hypoxia and hypercapnia (CMS/HCC)   Hyperkalemia   Hyperthyroidism   Leukocytosis, unspecified type   Post hypoxic myoclonus       DISPOSITION  ADMISSION    Discussed treatment plan and reason for admission with pt/family and admitting physician.  Pt/family voiced understanding of the plan for admission for further testing/treatment as needed.       Latest Documented Vital Signs:  As of 10:41 PM  BP- (!) 88/58 HR- 66 Temp- 96 °F (35.6 °C) (Rectal) O2 sat- 98%    --  Documentation assistance provided by manny May for Dr. Bebo MD.  Information recorded by the scribe was done at my direction and has been verified and validated by me.       Hussain May  01/16/20 7416       Niranjan Lagunas MD  01/16/20 4193

## 2020-01-17 NOTE — PROGRESS NOTES
"Pharmacokinetic Consult - Vancomycin Dosing (Initial Note)    Leonor Elisabet has been consulted for pharmacy to dose vancomycin for pneumonia.  Pharmacy dosing vancomycin per Dr. Caban's request.   Goal trough: 15-20 mg/L   Other antimicrobials: Ceftriaxone 1gm q24h    HPI: 63 y/o femaile presenting to ED in full cardiac arrest via EMS. Patient received ACLS protocol for cardiac arrest resuscitation and was given epinephrine twice in route to ED. Endotracheal tube placed upon arrival. Profound metabolic acidosis on ABG and currently hypotensive (88/58). Additionally, likely suffering from hypoxic brain injury during arrest and now experiencing seizures.    Relevant clinical data and objective history reviewed:  64 y.o. female 157.5 cm (62\") 72.9 kg (160 lb 11.5 oz)    Past Medical History:   Diagnosis Date   • A-fib (CMS/[facility])    • Anxiety    • Asthma    • COPD (chronic obstructive pulmonary disease) (CMS/[facility])    • Depression    • Graves disease    • Hyperlipidemia    • Hypertension    • Hyperthyroidism      Creatinine   Date Value Ref Range Status   2020 0.70 0.57 - 1.00 mg/dL Final     BUN   Date Value Ref Range Status   2020 6 (L) 8 - 23 mg/dL Final     Estimated Creatinine Clearance: 77.9 mL/min (by C-G formula based on SCr of 0.7 mg/dL).    Lab Results   Component Value Date    WBC 21.11 (H) 2020     Temp Readings from Last 3 Encounters:   20 96 °F (35.6 °C) (Rectal)      Baseline culture/source/susceptibility:      S. pneumo AG pending   Legionella AG pending   Resp. Panel/PCR on order, not collected   Resp. cx pending   MRSA screen/PCR ordered    Imagin/16 - CXR  IMPRESSION:  Endotracheal intubation. Cardiomegaly with pulmonary  vascular congestion. Hazy opacity in the left mid and upper lung.  Follow-up recommended.     Assessment/Plan  Vancomycin 1500mg (20mg/kg) loading dose given at 2127. CrCl currently estimated at 82.6ml/min from Scr drawn shortly after arrival. Will need " to follow up in AM following cardiac arrest, hypotension.     1) For now, will start vancomycin 1250mg (17mg/kg) IV q12h. CMP on order for AM draw, follow Scr closely.    2) Trough currently scheduled for 1/18 at 0830 prior to 4th total dose.    3) Will monitor serum creatinine every 24 hours for the first 3 days then at least every 48 hours per dosing recommendations.    4) Pharmacy will continue to follow daily while on vancomycin and adjust as needed.     Dave Zuñiga, [facility]

## 2020-01-17 NOTE — PROGRESS NOTES
Dr. NADINE Caban    [facility] CORONARY CARE    1/17/2020    Patient ID:  Name:  Leonor Bhandari  MRN:  [id number]  1955  64 y.o.  female            CC/Reason for visit: Cardiac arrest post resuscitation, acute respiratory failure on chronic    Interval hx: Patient is intubated, mechanically ventilated.  She is still having sudden jerking and tonic-clonic activity of her face, neck and shoulders.  This suggests ongoing seizures.    ROS: Unobtainable because she is intubated.    Mechanical ventilator settings were reviewed and adjusted by me today    Vitals:  Vitals:    01/17/20 1100 01/17/20 1130 01/17/20 1200 01/17/20 1300   BP: 123/92  144/82 140/74   BP Location:       Patient Position:       Pulse: 108  112 98   Resp:  (!) 30     Temp: 100.2 °F (37.9 °C)      TempSrc: Rectal      SpO2: 97%  97% 97%   Weight:       Height:         FiO2 (%): 41 %     Body mass index is 29.96 kg/m².    Intake/Output Summary (Last 24 hours) at 1/17/2020 1414  Last data filed at 1/17/2020 1230  Gross per 24 hour   Intake 3708.91 ml   Output 425 ml   Net 3283.91 ml       Exam:  GEN:  Intubated, mechanically ventilated.  Does have occasional tonic-clonic jerking and contractions of her facial muscles, head neck and shoulders  LUNGS: Clear breath sounds bilat, no use of accessory muscles  CV:  Normal S1S2, without murmur, 1+ ankle edema bilaterally  ABD:  Non tender, no enlarged liver or masses      Scheduled meds:    albuterol sulfate HFA 6 puff Vent nebulization Q6H - RT   cefTRIAXone 1 g Intravenous Q24H   famotidine 20 mg Intravenous Q12H   insulin regular 0-7 Units Subcutaneous Q6H   levETIRAcetam 500 mg Intravenous Q12H   LORazepam 2 mg Intravenous Once   methylPREDNISolone sodium succinate 40 mg Intravenous Q12H   metoprolol tartrate 2.5 mg Intravenous Q6H   sodium chloride 10 mL Intravenous Q12H     IV meds:                        norepinephrine 0.02-0.3 mcg/kg/min    propofol 5-50 mcg/kg/min Last Rate: 50  mcg/kg/min (01/17/20 1404)       Data Review:   I reviewed the patient's medications and new clinical results.  Lab Results   Component Value Date    CALCIUM 8.7 01/17/2020    PHOS 6.3 (H) 01/17/2020    MG 1.9 01/17/2020    MG 2.8 (H) 01/16/2020     Results from last 7 days   Lab Units 01/17/20  0455 01/16/20 2034 01/16/20  1721   SODIUM mmol/L 146* 145 138   POTASSIUM mmol/L 4.3 4.1 6.4*   CHLORIDE mmol/L 107 104 95*   CO2 mmol/L 19.5* 25.8 17.3*   BUN mg/dL 22 11 6*   CREATININE mg/dL 0.72 0.66 0.70   CALCIUM mg/dL 8.7 9.2 10.6*   BILIRUBIN mg/dL 0.4  --  0.2   ALK PHOS U/L 95  --  135*   ALT (SGPT) U/L 44*  --  24   AST (SGOT) U/L 91*  --  54*   GLUCOSE mg/dL 212* 139* 173*   WBC 10*3/mm3 27.07*  --  21.11*   HEMOGLOBIN g/dL 13.8  --  14.5   PLATELETS 10*3/mm3 388  --  390   INR  2.77*  --  2.75*   PROBNP pg/mL 1,474.0*  --  1,747.0*   PROCALCITONIN ng/mL 6.20*  --   --              Results from last 7 days   Lab Units 01/16/20  1721   TROPONIN T ng/mL <0.010     Results from last 7 days   Lab Units 01/16/20 2031 01/16/20  1722   PH, ARTERIAL pH units 7.217* 6.690*   PCO2, ARTERIAL mm Hg 68.6* 105.5*   PO2 ART mm Hg 86.4 346.7*   MODALITY  Adult Vent Adult Vent   O2 SATURATION CALC % 93.8 99.5*         ASSESSMENT:   Resuscitated cardiac arrest  Acute respiratory failure  Probable pulmonary edema  Seizures  Probable hypoxic brain injury  Probable COPD exacerbation  Severe respiratory acidosis  Probable acute heart failure  Hyperkalemia  Hypercalcemia  Hyperthyroidism  Elevated INR    PLAN:  Patient remains critically ill.  Not responding.  She does have these periodic tonic-clonic jerking motions of her face, head and neck and shoulder suggesting seizures.  We will consult neurology.  We will start EEG monitoring.  Give Ativan now to try to suppress these tonic-clonic jerking movements since this could be subclinical seizures.  Continue adjusting mechanical ventilation.  Blood gas shows improvement in acidosis  but still has elevated PCO2.  There is only mild liver enzyme elevation but we will monitor this closely.  Continue albuterol, IV glucocorticoids.  Patient's procalcitonin and white count are elevated, this suggests sepsis and possible pneumonia.  We will continue vancomycin and Rocephin for empiric antibiotic coverage.  Continue Pepcid for stress ulcer prophylaxis.  Currently INR is elevated, patient takes Coumadin at home.  Continue to monitor INR.    Total critical care time 33 minutes excluding any separately billable procedure time      Ajit Caban MD  1/17/2020

## 2020-01-17 NOTE — PLAN OF CARE
Problem: Patient Care Overview  Goal: Plan of Care Review  Outcome: Ongoing (interventions implemented as appropriate)  Flowsheets (Taken 1/17/2020 7803)  Progress: no change  Plan of Care Reviewed With: daughter; son  Outcome Summary: Remains unresponsive. Still with frequent generalized tremors. Temp max 100.4 degrees F. Plan of care explained to daughter and son. Will continue to monitor closely.

## 2020-01-17 NOTE — CONSULTS
Patient Identification:  NAME:  Leonor Bhandari  Age:  64 y.o.   Sex:  female   :  1955   MRN:  [id number]       Chief complaint: He does not have one reason for consult unresponsive status post arrest    History of present illness: This patient is 64-year-old right-handed white female history of severe COPD anxiety asthma hyperlipidemia hypertension who comes to the hospital with shortness of breath she got here and had cardiac arrest with status post resuscitation and is on Diprivan and a ventilator.  The ventilator is set on 30 to over breathe her for her acidosis.  When the Diprivan cut back she has significant tremor in the extremities suggestive of seizure activity but not entirely with some biting down of the endotracheal tube additionally.  Context patient did have arrest and was successfully resuscitated here at the hospital.  Locations not pertinent duration not known quality as described CT of the head by my independent eyeball review is normal  Past medical history:  Past Medical History:   Diagnosis Date   • A-fib (CMS/[facility])    • Anxiety    • Asthma    • COPD (chronic obstructive pulmonary disease) (CMS/[facility])    • Depression    • Graves disease    • Hyperlipidemia    • Hypertension    • Hyperthyroidism        Past surgical history:  No past surgical history on file.    Allergies:  Ramipril    Home medications:  Medications Prior to Admission   Medication Sig Dispense Refill Last Dose   • albuterol sulfate  (90 Base) MCG/ACT inhaler Inhale 2 puffs 4 (Four) Times a Day.      • digoxin (LANOXIN) 250 MCG tablet Take 250 mcg by mouth Daily.      • dilTIAZem (CARDIZEM) 90 MG tablet Take 90 mg by mouth 4 (Four) Times a Day. With meals and at bedtime      • escitalopram (LEXAPRO) 20 MG tablet Take 20 mg by mouth Daily.      • metoprolol succinate XL (TOPROL-XL) 25 MG 24 hr tablet Take 25 mg by mouth 2 (Two) Times a Day.      • predniSONE (DELTASONE) 5 MG tablet Take 5-10 mg by mouth Daily.      •  rOPINIRole (REQUIP) 1 MG tablet Take 1 mg by mouth Every Night. Take 1 hour before bedtime.      • warfarin (COUMADIN) 3 MG tablet Take 9 mg by mouth See Admin Instructions. Patient takes on thursday      • warfarin (COUMADIN) 3 MG tablet Take 6 mg by mouth See Admin Instructions. Patient takes everyday BUT thursday           Hospital medications:    albuterol sulfate HFA 6 puff Vent nebulization Q6H - RT   cefTRIAXone 1 g Intravenous Q24H   famotidine 20 mg Intravenous Q12H   insulin regular 0-7 Units Subcutaneous Q6H   levETIRAcetam 500 mg Intravenous Q12H   LORazepam 2 mg Intravenous Once   methylPREDNISolone sodium succinate 40 mg Intravenous Q12H   sodium chloride 10 mL Intravenous Q12H       propofol 5-50 mcg/kg/min Last Rate: 50 mcg/kg/min (01/17/20 1404)     dextrose  •  dextrose  •  dextrose  •  dextrose  •  fentaNYL citrate (PF)  •  glucagon (human recombinant)  •  glucagon (human recombinant)  •  LORazepam  •  LORazepam  •  ondansetron **OR** ondansetron  •  sodium chloride    Family history:  No family history on file.    Social history:  Social History     Tobacco Use   • Smoking status: Not on file   Substance Use Topics   • Alcohol use: Not on file   • Drug use: Not on file       Review of systems:    She has had heart problems and previous shortness of breath and COPD according to the daughter nothing else can be determined from the daughter at this point review of systems wise the patient cannot tell me anything I reviewed the chart fully in short the patient is never had seizures before but the daughter states she has had significant tremor before    Objective:  Vitals Ranges:   Temp:  [96 °F (35.6 °C)-100.2 °F (37.9 °C)] 100.2 °F (37.9 °C)  Heart Rate:  [] 98  Resp:  [18-30] 30  BP: ()/(55-92) 140/74  FiO2 (%):  [40 %-100 %] 41 %      Physical Exam:  Comatose pupils 2 constricting to 1-1/2 slightly corneals are absent bilaterally.  No other cranial nerves could be tested no withdrawal  "grimace or posturing to pain in any extremity reflexes absent throughout toes mute or downgoing bilaterally sensation coordination station and gait could not be tested heart regular without murmur next moderately rigid without bruits extremities no clubbing cyanosis or edema    Results review:   I reviewed the patient's new clinical results.    Data review:  Lab Results (last 24 hours)     Procedure Component Value Units Date/Time    POC Glucose Once [[id number]]  (Abnormal) Collected:  01/17/20 1110    Specimen:  Blood Updated:  01/17/20 1125     Glucose 165 mg/dL     POC Glucose Once [[id number]]  (Abnormal) Collected:  01/17/20 0724    Specimen:  Blood Updated:  01/17/20 0738     Glucose 171 mg/dL     Procalcitonin [[id number]]  (Abnormal) Collected:  01/17/20 0455    Specimen:  Blood Updated:  01/17/20 0600     Procalcitonin 6.20 ng/mL     Narrative:       As a Marker for Sepsis (Non-Neonates):   1. <0.5 ng/mL represents a low risk of severe sepsis and/or septic shock.  1. >2 ng/mL represents a high risk of severe sepsis and/or septic shock.    As a Marker for Lower Respiratory Tract Infections that require antibiotic therapy:  PCT on Admission     Antibiotic Therapy             6-12 Hrs later  > 0.5                Strongly Recommended            >0.25 - <0.5         Recommended  0.1 - 0.25           Discouraged                   Remeasure/reassess PCT  <0.1                 Strongly Discouraged          Remeasure/reassess PCT      As 28 day mortality risk marker: \"Change in Procalcitonin Result\" (> 80 % or <=80 %) if Day 0 (or Day 1) and Day 4 values are available. Refer to [email or web address]-pct-[email or web address]/   Change in PCT <=80 %   A decrease of PCT levels below or equal to 80 % defines a positive change in PCT test result representing a higher risk for 28-day all-cause mortality of patients diagnosed with severe sepsis or septic shock.  Change in PCT > 80 %   A decrease of PCT levels of more than 80 % defines " a negative change in PCT result representing a lower risk for 28-day all-cause mortality of patients diagnosed with severe sepsis or septic shock.                Results may be falsely decreased if patient taking Biotin.     BNP [[id number]]  (Abnormal) Collected:  01/17/20 0455    Specimen:  Blood Updated:  01/17/20 0556     proBNP 1,474.0 pg/mL     Narrative:       Among patients with dyspnea, NT-proBNP is highly sensitive for the detection of acute congestive heart failure. In addition NT-proBNP of <300 pg/ml effectively rules out acute congestive heart failure with 99% negative predictive value.    Results may be falsely decreased if patient taking Biotin.      Magnesium [[id number]]  (Normal) Collected:  01/17/20 0455    Specimen:  Blood Updated:  01/17/20 0554     Magnesium 1.9 mg/dL     Comprehensive Metabolic Panel [[id number]]  (Abnormal) Collected:  01/17/20 0455    Specimen:  Blood Updated:  01/17/20 0549     Glucose 212 mg/dL      BUN 22 mg/dL      Creatinine 0.72 mg/dL      Sodium 146 mmol/L      Potassium 4.3 mmol/L      Chloride 107 mmol/L      CO2 19.5 mmol/L      Calcium 8.7 mg/dL      Total Protein 6.5 g/dL      Albumin 3.10 g/dL      ALT (SGPT) 44 U/L      AST (SGOT) 91 U/L      Alkaline Phosphatase 95 U/L      Total Bilirubin 0.4 mg/dL      eGFR Non African Amer 82 mL/min/1.73      Globulin 3.4 gm/dL      A/G Ratio 0.9 g/dL      BUN/Creatinine Ratio 30.6     Anion Gap 19.5 mmol/L     Narrative:       GFR Normal >60  Chronic Kidney Disease <60  Kidney Failure <15      Phosphorus [[id number]]  (Abnormal) Collected:  01/17/20 0455    Specimen:  Blood Updated:  01/17/20 0544     Phosphorus 6.3 mg/dL     Digoxin Level [[id number]]  (Normal) Collected:  01/17/20 0455    Specimen:  Blood Updated:  01/17/20 0544     Digoxin 1.00 ng/mL     Narrative:       Results may be falsely increased if patient taking Biotin.      Protime-INR [[id number]]  (Abnormal) Collected:  01/17/20 0455    Specimen:  Blood Updated:   01/17/20 0539     Protime 29.0 Seconds      INR 2.77    Legionella Antigen, Urine - Urine, Urine, Catheter [[id number]]  (Normal) Collected:  01/17/20 0211    Specimen:  Urine, Catheter Updated:  01/17/20 0524     LEGIONELLA ANTIGEN, URINE Negative    S. Pneumo Ag Urine or CSF - Urine, Urine, Catheter [[id number]]  (Normal) Collected:  01/17/20 0211    Specimen:  Urine, Catheter Updated:  01/17/20 0524     Strep Pneumo Ag Negative    CBC & Differential [[id number]] Collected:  01/17/20 0455    Specimen:  Blood Updated:  01/17/20 0516    Narrative:       The following orders were created for panel order CBC & Differential.  Procedure                               Abnormality         Status                     ---------                               -----------         ------                     CBC Auto Differential[[id number]]        Abnormal            Final result                 Please view results for these tests on the individual orders.    CBC Auto Differential [[id number]]  (Abnormal) Collected:  01/17/20 0455    Specimen:  Blood Updated:  01/17/20 0516     WBC 27.07 10*3/mm3      RBC 5.55 10*6/mm3      Hemoglobin 13.8 g/dL      Hematocrit 43.9 %      MCV 79.1 fL      MCH 24.9 pg      MCHC 31.4 g/dL      RDW 16.3 %      RDW-SD 46.1 fl      MPV 9.6 fL      Platelets 388 10*3/mm3      Neutrophil % 93.0 %      Lymphocyte % 2.1 %      Monocyte % 3.9 %      Eosinophil % 0.0 %      Basophil % 0.2 %      Immature Grans % 0.8 %      Neutrophils, Absolute 25.16 10*3/mm3      Lymphocytes, Absolute 0.57 10*3/mm3      Monocytes, Absolute 1.05 10*3/mm3      Eosinophils, Absolute 0.00 10*3/mm3      Basophils, Absolute 0.06 10*3/mm3      Immature Grans, Absolute 0.23 10*3/mm3      nRBC 0.0 /100 WBC     MRSA Screen, PCR - Swab, Nares [[id number]]  (Normal) Collected:  01/17/20 0234    Specimen:  Swab from Nares Updated:  01/17/20 0516     MRSA PCR No MRSA Detected    Respiratory Panel, PCR - Swab, Nasopharynx [[id number]]  (Normal)  Collected:  01/17/20 0234    Specimen:  Swab from Nasopharynx Updated:  01/17/20 0506     ADENOVIRUS, PCR Not Detected     Coronavirus 229E Not Detected     Coronavirus HKU1 Not Detected     Coronavirus NL63 Not Detected     Coronavirus OC43 Not Detected     Human Metapneumovirus Not Detected     Human Rhinovirus/Enterovirus Not Detected     Influenza B PCR Not Detected     Parainfluenza Virus 1 Not Detected     Parainfluenza Virus 2 Not Detected     Parainfluenza Virus 3 Not Detected     Parainfluenza Virus 4 Not Detected     Bordetella pertussis pcr Not Detected     Influenza A H1 2009 PCR Not Detected     Chlamydophila pneumoniae PCR Not Detected     Mycoplasma pneumo by PCR Not Detected     Influenza A PCR Not Detected     Influenza A H3 Not Detected     Influenza A H1 Not Detected     RSV, PCR Not Detected     Bordetella parapertussis PCR Not Detected    Basic Metabolic Panel [[id number]]  (Abnormal) Collected:  01/16/20 2034    Specimen:  Blood Updated:  01/16/20 2135     Glucose 139 mg/dL      BUN 11 mg/dL      Creatinine 0.66 mg/dL      Sodium 145 mmol/L      Potassium 4.1 mmol/L      Chloride 104 mmol/L      CO2 25.8 mmol/L      Calcium 9.2 mg/dL      eGFR Non African Amer 91 mL/min/1.73      BUN/Creatinine Ratio 16.7     Anion Gap 15.2 mmol/L     Narrative:       GFR Normal >60  Chronic Kidney Disease <60  Kidney Failure <15      Blood Gas, Arterial [[id number]]  (Abnormal) Collected:  01/16/20 2031    Specimen:  Arterial Blood Updated:  01/16/20 2034     Site Arterial: right radial     Navid's Test Positive     pH, Arterial 7.217 pH units      Comment: Critical:Notify GLENNY dai (16-Jan-20 20:33:09)Read back ok        pCO2, Arterial 68.6 mm Hg      Comment: Critical:Notify GLENNY dai (16-Jan-20 20:33:09)Read back ok        pO2, Arterial 86.4 mm Hg      HCO3, Arterial 27.9 mmol/L      Base Excess, Arterial -1.8 mmol/L      O2 Saturation Calculated 93.8 %      A-a Gradiant 0.4 mmHg      Barometric  Pressure for Blood Gas 768.6 mmHg      Modality Adult Vent     FIO2 40 %      Ventilator Mode PC     Set Tidal Volume 430     Set [facility] Resp Rate 26     Rate 26 Breaths/minute      PEEP 5    POC Glucose Once [[id number]]  (Abnormal) Collected:  01/16/20 1914    Specimen:  Blood Updated:  01/16/20 1916     Glucose 67 mg/dL     T4, Free [[id number]]  (Abnormal) Collected:  01/16/20 1721    Specimen:  Blood Updated:  01/16/20 1904     Free T4 2.21 ng/dL     Narrative:       Results may be falsely increased if patient taking Biotin.      T3, Free [[id number]]  (Abnormal) Collected:  01/16/20 1721    Specimen:  Blood Updated:  01/16/20 1904     T3, Free 16.60 pg/mL     Narrative:       Results may be falsely increased if patient taking Biotin.      Digoxin Level [[id number]]  (Normal) Collected:  01/16/20 1721    Specimen:  Blood Updated:  01/16/20 1817     Digoxin 1.20 ng/mL     Narrative:       Results may be falsely increased if patient taking Biotin.      CBC & Differential [[id number]] Collected:  01/16/20 1721    Specimen:  Blood Updated:  01/16/20 1811    Narrative:       The following orders were created for panel order CBC & Differential.  Procedure                               Abnormality         Status                     ---------                               -----------         ------                     CBC Auto Differential[[id number]]        Abnormal            Final result                 Please view results for these tests on the individual orders.    CBC Auto Differential [[id number]]  (Abnormal) Collected:  01/16/20 1721    Specimen:  Blood Updated:  01/16/20 1811     WBC 21.11 10*3/mm3      RBC 5.63 10*6/mm3      Hemoglobin 14.5 g/dL      Hematocrit 47.6 %      MCV 84.5 fL      MCH 25.8 pg      MCHC 30.5 g/dL      RDW 16.4 %      RDW-SD 49.0 fl      MPV 10.3 fL      Platelets 390 10*3/mm3     Manual Differential [[id number]]  (Abnormal) Collected:  01/16/20 1721    Specimen:  Blood Updated:  01/16/20 1811      Neutrophil % 46.0 %      Lymphocyte % 6.0 %      Monocyte % 9.0 %      Eosinophil % 2.0 %      Metamyelocyte % 1.0 %      Myelocyte % 3.0 %      Atypical Lymphocyte % 33.0 %      Neutrophils Absolute 9.71 10*3/mm3      Lymphocytes Absolute 1.27 10*3/mm3      Monocytes Absolute 1.90 10*3/mm3      Eosinophils Absolute 0.42 10*3/mm3      RBC Morphology Normal     WBC Morphology Normal     Platelet Morphology Normal    TSH [[id number]]  (Abnormal) Collected:  01/16/20 1721    Specimen:  Blood Updated:  01/16/20 1804     TSH <0.005 uIU/mL     BNP [[id number]]  (Abnormal) Collected:  01/16/20 1721    Specimen:  Blood Updated:  01/16/20 1800     proBNP 1,747.0 pg/mL     Narrative:       Among patients with dyspnea, NT-proBNP is highly sensitive for the detection of acute congestive heart failure. In addition NT-proBNP of <300 pg/ml effectively rules out acute congestive heart failure with 99% negative predictive value.    Results may be falsely decreased if patient taking Biotin.      Troponin [[id number]]  (Normal) Collected:  01/16/20 1721    Specimen:  Blood Updated:  01/16/20 1800     Troponin T <0.010 ng/mL     Narrative:       Troponin T Reference Range:  <= 0.03 ng/mL-   Negative for AMI  >0.03 ng/mL-     Abnormal for myocardial necrosis.  Clinicians would have to utilize clinical acumen, EKG, Troponin and serial changes to determine if it is an Acute Myocardial Infarction or myocardial injury due to an underlying chronic condition.       Results may be falsely decreased if patient taking Biotin.      Comprehensive Metabolic Panel [[id number]]  (Abnormal) Collected:  01/16/20 1721    Specimen:  Blood Updated:  01/16/20 1757     Glucose 173 mg/dL      BUN 6 mg/dL      Creatinine 0.70 mg/dL      Sodium 138 mmol/L      Potassium 6.4 mmol/L      Chloride 95 mmol/L      CO2 17.3 mmol/L      Calcium 10.6 mg/dL      Total Protein 7.3 g/dL      Albumin 3.60 g/dL      ALT (SGPT) 24 U/L      AST (SGOT) 54 U/L      Comment:  Specimen hemolyzed.  Results may be affected.        Alkaline Phosphatase 135 U/L      Total Bilirubin 0.2 mg/dL      eGFR Non African Amer 85 mL/min/1.73      Globulin 3.7 gm/dL      A/G Ratio 1.0 g/dL      BUN/Creatinine Ratio 8.6     Anion Gap 25.7 mmol/L     Narrative:       GFR Normal >60  Chronic Kidney Disease <60  Kidney Failure <15      Magnesium [[id number]]  (Abnormal) Collected:  01/16/20 1721    Specimen:  Blood Updated:  01/16/20 1753     Magnesium 2.8 mg/dL     Protime-INR [[id number]]  (Abnormal) Collected:  01/16/20 1721    Specimen:  Blood Updated:  01/16/20 1744     Protime 28.8 Seconds      INR 2.75    Blood Gas, Arterial [[id number]]  (Abnormal) Collected:  01/16/20 1722    Specimen:  Arterial Blood Updated:  01/16/20 1728     Site Arterial: right radial     Navid's Test Positive     pH, Arterial 6.690 pH units      Comment: Critical:Notify Dr dr tomlinson (16-Jan-20 17:26:15)Read back ok        pCO2, Arterial 105.5 mm Hg      Comment: Critical:Notify Dr dr tomlinson (16-Jan-20 17:26:15)Read back ok        pO2, Arterial 346.7 mm Hg      HCO3, Arterial 12.7 mmol/L      Base Excess, Arterial -26.5 mmol/L      O2 Saturation Calculated 99.5 %      A-a Gradiant 0.5 mmHg      Barometric Pressure for Blood Gas 764.3 mmHg      Modality Adult Vent     FIO2 100 %      Ventilator Mode AC     Set Mech Resp Rate 12     Rate 24 Breaths/minute      PEEP 5           Imaging:  Imaging Results (Last 24 Hours)     Procedure Component Value Units Date/Time    XR Chest 1 View [[id number]] Collected:  01/17/20 0752     Updated:  01/17/20 1035    Narrative:       CLINICAL HISTORY: 64-year-old female with respiratory failure. Intubated  patient.     PORTABLE AP SEMIERECT CHEST DATED 01/17/2020 AT 0328 HOURS     FINDINGS: When compared to  image and multiplanar sections of CT  chest exam 1 day ago 01/16/2020 at 1857 hours, and with review of the  only other available chest imaging study at this facility, the portable  AP  supine chest radiograph of 01/16/2020 at 1711 hours, an endotracheal  tube remains and lies with its tip projecting 3.2 cm above the amira,  below the level of medial clavicles. No segmental or lobar collapse or  consolidation is seen in the lungs. Left costophrenic angle is sharp.  There is trace hazy to patchy blunting of right costophrenic angle that  may be due to fluid or patchy atelectasis versus infiltrate. Crowded  markings and some modest patchy opacity in the right lower lung zone are  otherwise also demonstrated. The cardiac silhouette remains borderline  enlarged caliber. Monitoring lead wires are present. Fullness of upper  mediastinal contours is compatible with the thyromegaly and lymph nodes  demonstrated on the CT scan. No acute change in bony thorax is  identified. No pneumothorax is seen.     CONCLUSION: Slightly prominent markings throughout the lungs and modest  patchy opacity at the right lung base. Only slightly less prominent  markings are present at the left lung base. Trace blunting at the right  costophrenic angle is present. Endotracheal tube remains.     This report was finalized on 1/17/2020 10:32 AM by Dr. Roger Garcia M.D.       CT Chest Without Contrast [[id number]] Collected:  01/16/20 1929     Updated:  01/16/20 1939    Narrative:       CT CHEST WO CONTRAST-     Radiation dose reduction techniques were utilized, including automated  exposure control and exposure modulation based on body size.     Clinical: Shortness of breath     FINDINGS: There is diffuse thyroid enlargement consistent with goiter.  There is an endotracheal tube in place, tip situated above the amira.  Diameter of the thoracic aorta is within normal limits. There is cardiac  enlargement no pericardial abnormality. Esophagus is satisfactory in  course and caliber.     Mild to moderately enlarged mediastinal lymph nodes demonstrated. Along  the medial right upper lobe, there is a subpleural curvilinear area  of  likely atelectasis with a similar area along the medial right middle  lobe. At the base of the lingular segment left upper lobe there is a  subtle wedge-shaped area of likely atelectasis as well. Adjacent to this  location there is a 9 x 5 mm subpleural nodular density on image 43.  Located along the medial left upper lobe, there is a small focus of  subpleural atelectasis or consolidation. This measures approximately 2  cm.     There are bilateral areas of peripheral bronchial wall thickening  suggesting bronchitis. No pleural effusion is demonstrated. Incidentally  noted L1 compression deformity.     CONCLUSION:  1. Endotracheal tube in position, tip 2.7 cm above the amira.  2. Goiter.  3. Peripheral small areas of atelectasis involving both lungs with a  small focus of consolidation or atelectasis medial left upper lobe.  4. Mild to enlarged mediastinal lymph nodes.  5. Cardiomegaly.  6. Subtle areas of bronchial wall thickening suggestive of bronchitis.        This report was finalized on 1/16/2020 7:36 PM by Dr. Parag Joy M.D.       CT Head Without Contrast [[id number]] Collected:  01/16/20 1905     Updated:  01/16/20 1916    Narrative:       CT HEAD WO CONTRAST-     INDICATIONS: Altered mental status     TECHNIQUE: Radiation dose reduction techniques were utilized, including  automated exposure control and exposure modulation based on body size.  Noncontrast head CT     COMPARISON: None available     FINDINGS:           No acute intracranial hemorrhage, midline shift or mass effect. No acute  territorial infarct is identified.     Mild periventricular hypodensities suggest chronic small vessel ischemic  change in a patient this age.     Arterial calcifications are seen in the base of the brain.     Ventricles, cisterns, cerebral sulci are unremarkable for patient age.     The visualized paranasal sinuses, orbits, mastoid air cells are  unremarkable.                   Impression:          No acute  intracranial hemorrhage or hydrocephalus. If there is further  clinical concern, MRI could be considered for further evaluation.     This report was finalized on 1/16/2020 7:12 PM by Dr. Porfirio Hayward M.D.       XR Chest 1 View [[id number]] Collected:  01/16/20 1726     Updated:  01/16/20 1730    Narrative:       XR CHEST 1 VW-     HISTORY: Female who is 64 years-old,  short of breath, endotracheal  intubation     TECHNIQUE: Frontal view of the chest     COMPARISON: None available     FINDINGS: Endotracheal tube tip is 3.2 cm above the amira. The heart is  enlarged. Pulmonary vasculature is congested. Hazy opacity in the left  mid to upper lung may be edema or developing infection, clinical  correlation follow-up recommended. No pleural effusion, or pneumothorax  is seen, limited by supine positioning. No acute osseous process.       Impression:       Endotracheal intubation. Cardiomegaly with pulmonary  vascular congestion. Hazy opacity in the left mid and upper lung.  Follow-up recommended.     This report was finalized on 1/16/2020 5:27 PM by Dr. Porfirio Hayward M.D.                Assessment and Plan:       Successful cardiopulmonary resuscitation    She currently is comatose with probable an anoxic encephalopathy nonetheless she has had 4 mg of Ativan today and is been on Diprivan I have just DC'd the Diprivan and she is not doing anything much in the way of myoclonus or spontaneous movement.  The ventilator set on 30 so she is breathing 30.  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  At this point IV Depacon might be better for action myoclonus as well as seizures and I will begin that 500 mg IV every 8 hours and DC the Keppra.  We are waiting for the final result of the EEG but we will be aggressively treating both for  seizures as well as action myoclonus      Jose Alberto Justice MD  01/17/20  3:41 PM

## 2020-01-17 NOTE — PROGRESS NOTES
The case was discussed with Dr. Caban early in the evening  Discussed with the family at the bedside after the events were reviewed  Patient was examined  Repeat ABGs showed improvement in the level of acidosis with some persistent respiratory acidemia and the ventilator setting was adjusted further  Patient had elevated T3 and T4 with significantly reduced TSH consistent with hyperthyroidism, had a cardiac arrest and will start the patient on low-dose beta-blocker since it can be tolerated given the blood pressure profile  We will repeat labs again in the morning  The potassium was rechecked after the acidosis was improving and it is within acceptable level and no need for any active intervention from the electrolyte standpoint  Patient was initially paralyzed and could not do a good neurological assessment  She was started on Keppra for seizure prophylaxis from anoxic brain injury and had orders for Ativan as needed in addition to being on propofol  On reassessment around 10:30 PM, she was having the typical myoclonic jerk suggestive of significant anoxic encephalopathy, her respiratory drive was still intact, sluggish corneal reflexes but no spontaneous movement.  Lung was clear to auscultation  Lung compliance was within acceptable level.  Discussed with her therapist, discussed with the nursing staff  Start metoprolol 2.5 mg IV every 6 hours  Continue respiratory support  Repeat gas in the morning  Keppra IV  Ativan as needed  Continue propofol    Discussed with the family, at this point and family are agreeable that patient will be DO NOT RESUSCITATE in case of a cardiac arrest but they want to continue with the other supportive measures which is reasonable at least until we have reassessment 12 hours later.  Myoclonic jerks are a negative prognostic sign, consider neurology consultation in the morning to reassess and help the family with the decision since they are contemplating palliative measures if the  patient has low chance of descent or significant recovery    Total critical care time was 45 minutes

## 2020-01-18 NOTE — PLAN OF CARE
Problem: Patient Care Overview  Goal: Plan of Care Review  Outcome: Ongoing (interventions implemented as appropriate)  Flowsheets (Taken 1/18/2020 2186)  Progress: no change  Plan of Care Reviewed With: patient  Outcome Summary: when completly off sedation she flexes from noxious stimmuli in all 4 limbs, corneal reflexes are absent, became febrile overnight and has been treated with ice packs and tylenol, remains febrile and her HR has been 135-160 sustaining mostly in the 130's, appears in pain at times but is controlled with occasional fentanyl, adequate urine output, skin is warm and clammy, will continue to monitor

## 2020-01-18 NOTE — CONSULTS
Patient Name: Leonor Bhandari  :1955  64 y.o.    Date of Admission: 2020  Date of Consultation:  20  Encounter Provider: Quiana Garcia MD  Place of Service: [facility] CARDIOLOGY  Referring Provider: Ajit Caban MD  Patient Care Team:  Reyes, Miriam Mercado, MD as PCP - General (Family Medicine)      Chief complaint: Pulmonary arrest    History of Present Illness:    Leonor Bhandari is a 64-year-old female who follows normally with Dr. Oropeza with [facility] and has a history of paroxysmal atrial fibrillation, asthma, hypertension, steroid-induced diabetes, struct of sleep apnea on CPAP, hypothyroidism, COPD on oxygen.    She has recurrent respiratory failure requiring intubation.  She had atrial fibrillation with rapid ventricular response in March when she went over medications.  She had a Holter done May 2019 showing atrial flutter with adequate heart rate control.  She had echocardiogram done 2019 showing ejection fraction 55-60% and mild aortic valvular stenosis.  She is stress study done 2019 which showed moderately abnormal LV systolic function but no coronary ischemia.    She was admitted 2019 evaluated by Dr. Post for short windedness with cough and COPD exacerbation.  She was placed on BiPAP.  She had rhinovirus at the time and she was in atrial fibrillation with rapid ventricular response.  She was placed on diltiazem drip as well digoxin and Toprol.  She was discharged on 12/15/2019 and we have not seen her in the office since then.    He was brought in via EMS on 2020 in full arrest.  She was short winded using nebulized when they arrived in her house went to full respiratory arrest at 1626.  She received 2 rounds of epinephrine in route and her rhythm was initially atrial fibrillation.  Return of spontaneous circulation was achieved when she arrived, her magnesium was 2.8, TSH very low, WBCs  21,000, potassium 6.4, INR 2.75.  CT head was negative for bleed and chest x-ray showed cardiomegaly and pulmonary vascular congestion.    She Is intubated.  We are consulted because her heart rate is fast.  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    She cannot give any history due to her severe neurologic injury.  I did talk with her son and daughter at bedside and explained that her heart rate is in by her critical illness.  That would be unlikely that would be able to get her heart rate controlled.    Previous Cardiac Testing:    Stress Test  1/28/2019      Echocardiogram  5/7/2019      Holter Monitor  5/27/2019            Past Medical History:   Diagnosis Date   • A-fib (CMS/[facility])    • Anxiety    • Asthma    • COPD (chronic obstructive pulmonary disease) (CMS/[facility])    • Depression    • Graves disease    • Hyperlipidemia    • Hypertension    • Hyperthyroidism        No past surgical history on file.      Prior to Admission medications    Medication Sig Start Date End Date Taking? Authorizing Provider   albuterol sulfate  (90 Base) MCG/ACT inhaler Inhale 2 puffs 4 (Four) Times a Day.   Yes Roni Hurtado MD   digoxin (LANOXIN) 250 MCG tablet Take 250 mcg by mouth Daily.   Yes Roni Hurtado MD   dilTIAZem (CARDIZEM) 90 MG tablet Take 90 mg by mouth 4 (Four) Times a Day. With meals and at bedtime   Yes Rnoi Hurtado MD   escitalopram (LEXAPRO) 20 MG tablet Take 20 mg by mouth Daily.   Yes Roni Hurtado MD   metoprolol succinate XL (TOPROL-XL) 25 MG 24 hr tablet Take 25 mg by mouth 2 (Two) Times a Day.   Yes Roni Hurtado MD   predniSONE (DELTASONE) 5 MG tablet Take 5-10 mg by mouth Daily.   Yes Roni Hurtado MD   rOPINIRole (REQUIP) 1 MG tablet Take 1 mg by mouth Every Night. Take 1 hour before bedtime.   Yes Roni Hurtado MD   warfarin (COUMADIN) 3 MG tablet Take 9 mg by mouth See Admin  Instructions. Patient takes on thursday   Yes ProviderRoni MD   warfarin (COUMADIN) 3 MG tablet Take 6 mg by mouth See Admin Instructions. Patient takes everyday BUT thursday   Yes ProviderRoni MD       Allergies   Allergen Reactions   • Ramipril Unknown - High Severity       Social History     Socioeconomic History   • Marital status:      Spouse name: Not on file   • Number of children: Not on file   • Years of education: Not on file   • Highest education level: Not on file   Tobacco Use   • Smoking status: Current Some Day Smoker   • Smokeless tobacco: Never Used       No family history on file.    REVIEW OF SYSTEMS:   All systems reviewed.  Pertinent positives identified in HPI.  All other systems are negative.      Objective:     Vitals:    01/18/20 1102 01/18/20 1200 01/18/20 1227 01/18/20 1300   BP:  148/85  151/85   BP Location:       Patient Position:       Pulse:  (!) 128 (!) 140 (!) 129   Resp: 23      Temp:   99.5 °F (37.5 °C)    TempSrc:       SpO2:  96% 97% 95%   Weight:       Height:         Body mass index is 29.11 kg/m².    General Appearance:    in no acute distress   Head:    Normocephalic, without obvious abnormality, atraumatic   Eyes:            Lids and lashes normal, conjunctivae and sclerae normal, no icterus, no pallor,    Ears:    Ears appear intact with no abnormalities noted   Throat:  Orally intubated   Neck:   No adenopathy, supple, trachea midline, no thyromegaly, no carotid bruit, no JVD       Lungs:    deCreased with expiratory rhonchi, respirations regular, even and unlabored    Heart:   Irregular and tachycardia, normal S1 and S2, no murmur, no gallop, no rub, no click   Chest Wall:    No abnormalities observed   Abdomen:     Normal bowel sounds, no masses, no organomegaly, soft, nontender, nondistended, no guarding, no rebound  tenderness   Extremities:   Moves all extremities well, no edema, no cyanosis, no redness   Pulses:   Pulses palpable and equal  bilaterally. Normal radial, carotid,dorsalis pedis and posterior tibial pulses bilaterally.    Skin:  Psychiatric:   No bleeding, bruising or rash  On vent no appreciable response to stimuli   Lab Review:     Results from last 7 days   Lab Units 01/18/20  0126 01/17/20  0455   SODIUM mmol/L 147* 146*   POTASSIUM mmol/L 3.7 4.3   CHLORIDE mmol/L 111* 107   CO2 mmol/L 19.3* 19.5*   BUN mg/dL 35* 22   CREATININE mg/dL 0.75 0.72   CALCIUM mg/dL 8.8 8.7   BILIRUBIN mg/dL  --  0.4   ALK PHOS U/L  --  95   ALT (SGPT) U/L  --  44*   AST (SGOT) U/L  --  91*   GLUCOSE mg/dL 216* 212*     Results from last 7 days   Lab Units 01/16/20  1721   TROPONIN T ng/mL <0.010     Results from last 7 days   Lab Units 01/18/20  0126   WBC 10*3/mm3 19.31*   HEMOGLOBIN g/dL 13.2   HEMATOCRIT % 40.7   PLATELETS 10*3/mm3 312     Results from last 7 days   Lab Units 01/18/20  0126 01/17/20  0455 01/16/20  1721   INR  1.82* 2.77* 2.75*     Results from last 7 days   Lab Units 01/17/20  0455   MAGNESIUM mg/dL 1.9                 EKG  1/18/2020    Baseline EKG      I personally viewed and interpreted the patient's EKG/Telemetry data.        Assessment and Plan:       1. S/p respiratory arrest, on ventilator.  Severe anoxic brain injury per neurology.  2. 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 Severe COPD.    Try IV digoxin and increase intermittent dose of beta-blocker though I do not think either of these measures will be successful in controlling her heart rate as some of this is also driven centrally.  We will see her again tomorrow.  The family knows that her likelihood of recovery is very low.  Likely moving to withdrawal of care. Fentanyl helped with heart rate.     Quiana Garcia MD  01/18/20  1:21 PM

## 2020-01-18 NOTE — PROGRESS NOTES
LOS: 2 days   Patient Care Team:  Reyes, Miriam Mercado, MD as PCP - General (Family Medicine)    Subjective     Reviewed history patient was having what sounds like an exacerbation of her COPD respiratory difficulties EMS have been called and already picked the patient up when she had arrest in route and CPR was initiated.  Patient is sedated on propofol and currently not responding.    Review of Systems:          Objective     Vital Signs  Vital Sign Min/Max for last 24 hours  Temp  Min: 100.2 °F (37.9 °C)  Max: 100.2 °F (37.9 °C)   BP  Min: 103/64  Max: 163/87   Pulse  Min: 98  Max: 155   Resp  Min: 30  Max: 42   SpO2  Min: 93 %  Max: 100 %   No data recorded   Weight  Min: 72.2 kg (159 lb 2.8 oz)  Max: 72.2 kg (159 lb 2.8 oz)        Ventilator/Non-Invasive Ventilation Settings (From admission, onward)     Start     Ordered    01/16/20 1931  Ventilator - AC/PC; 20; 60; 90%; 5; 26  Continuous     Question Answer Comment   Vent Mode AC/PC    Breath rate 20    FiO2 60    FiO2 titrate for Sp02% =/> 90%    PEEP 5    Inspiratory Pressure 26        01/16/20 1930                             Body mass index is 29.11 kg/m².  I/O last 3 completed shifts:  In: 3282.9 [I.V.:2382.9; IV Piggyback:900]  Out: 1850 [Urine:1850]  No intake/output data recorded.        Physical Exam:  General Appearance: Overweight white female resting in bed she is sedated with propofol at 35 mics she is unresponsive to verbal stimuli.  She does not look in acute distress.  She is on the ventilator assist control of 30 tidal volume 500 PEEP of 5 FiO2 30% SPO2 98%.  Eyes: Conjunctiva are clear and anicteric pupils are about 2.5 mm they do not react to light.  ENT: Mucous membranes are little dry no erythema or exudates she has a 7 oral endotracheal tube at about 24 cm at the lips  Neck: No lymphadenopathy, she may have a little thyroid nodule trachea is midline there is no visible jugular venous distention no hepatojugular reflux  Lungs:  Pretty clear no wheezes no rales symmetric expansion.  She is not overbreathing the ventilator  Cardiac: Irregularly irregular heart rates in the 130s on the monitor it appears to be atrial fibrillation there is no murmur appreciated  Abdomen: Soft nontender no palpable hepatosplenomegaly or masses  : Wagner catheter dependent drainage clear yellow urine  Musculoskeletal: She has a little bit of edema both hands and feet otherwise unremarkable  Skin: Warm dry no jaundice no petechiae  Neuro: She is not responsive to verbal stimuli she does seem to open her eyes to nailbed pressure I did not get any withdrawal to deep nailbed pressure in either upper extremity she has some occasional spontaneous movement in her toes bilaterally but I really did not get any withdrawal reflex to nailbed pressure in her toes either.  Extremities/P Vascular: Palpable radial and dorsalis pedis pulses bilaterally  MSE: Unable to assess       Labs:  Results from last 7 days   Lab Units 01/18/20  0126 01/17/20  0455 01/16/20  2034 01/16/20  1721   GLUCOSE mg/dL 216* 212* 139* 173*   SODIUM mmol/L 147* 146* 145 138   POTASSIUM mmol/L 3.7 4.3 4.1 6.4*   MAGNESIUM mg/dL  --  1.9  --  2.8*   CO2 mmol/L 19.3* 19.5* 25.8 17.3*   CHLORIDE mmol/L 111* 107 104 95*   ANION GAP mmol/L 16.7* 19.5* 15.2* 25.7*   CREATININE mg/dL 0.75 0.72 0.66 0.70   BUN mg/dL 35* 22 11 6*   BUN / CREAT RATIO  46.7* 30.6* 16.7 8.6   CALCIUM mg/dL 8.8 8.7 9.2 10.6*   EGFR IF NONAFRICN AM mL/min/1.73 78 82 91 85   ALK PHOS U/L  --  95  --  135*   TOTAL PROTEIN g/dL  --  6.5  --  7.3   ALT (SGPT) U/L  --  44*  --  24   AST (SGOT) U/L  --  91*  --  54*   BILIRUBIN mg/dL  --  0.4  --  0.2   ALBUMIN g/dL 3.00* 3.10*  --  3.60   GLOBULIN gm/dL  --  3.4  --  3.7     Estimated Creatinine Clearance: 72.3 mL/min (by C-G formula based on SCr of 0.75 mg/dL).      Results from last 7 days   Lab Units 01/18/20  0126 01/17/20  0455 01/16/20  1721   WBC 10*3/mm3 19.31* 27.07* 21.11*    RBC 10*6/mm3 5.05 5.55* 5.63*   HEMOGLOBIN g/dL 13.2 13.8 14.5   HEMATOCRIT % 40.7 43.9 47.6*   MCV fL 80.6 79.1 84.5   MCH pg 26.1* 24.9* 25.8*   MCHC g/dL 32.4 31.4* 30.5*   RDW % 16.8* 16.3* 16.4*   RDW-SD fl 47.9 46.1 49.0   MPV fL 9.9 9.6 10.3   PLATELETS 10*3/mm3 312 388 390   NEUTROPHIL % % 86.4* 93.0*  --    LYMPHOCYTE % % 3.9* 2.1*  --    MONOCYTES % % 8.8 3.9*  --    EOSINOPHIL % % 0.0* 0.0*  --    BASOPHIL % % 0.2 0.2  --    IMM GRAN % % 0.7* 0.8*  --    NEUTROS ABS 10*3/mm3 16.71* 25.16* 9.71*   LYMPHS ABS 10*3/mm3 0.75 0.57*  --    MONOS ABS 10*3/mm3 1.69* 1.05*  --    EOS ABS 10*3/mm3 0.00 0.00 0.42*   BASOS ABS 10*3/mm3 0.03 0.06  --    IMMATURE GRANS (ABS) 10*3/mm3 0.13* 0.23*  --    NRBC /100 WBC 0.0 0.0  --      Results from last 7 days   Lab Units 01/18/20  0200   PH, ARTERIAL pH units 7.372   PO2 ART mm Hg 120.2*   PCO2, ARTERIAL mm Hg 41.1   HCO3 ART mmol/L 23.8     Results from last 7 days   Lab Units 01/16/20  1721   TROPONIN T ng/mL <0.010     Results from last 7 days   Lab Units 01/18/20  0126 01/17/20  0455 01/16/20  1721   PROBNP pg/mL 2,439.0* 1,474.0* 1,747.0*     Results from last 7 days   Lab Units 01/16/20  1721   TSH uIU/mL <0.005*   T3 FREE pg/mL 16.60*   FREE T4 ng/dL 2.21*     Results from last 7 days   Lab Units 01/18/20  0126 01/17/20  0455   LACTATE mmol/L 2.0  --    PROCALCITONIN ng/mL 4.86* 6.20*     Results from last 7 days   Lab Units 01/18/20  0126 01/17/20  0455 01/16/20  1721   INR  1.82* 2.77* 2.75*     Microbiology Results (last 10 days)     Procedure Component Value - Date/Time    Respiratory Panel, PCR - Swab, Nasopharynx [[id number]]  (Normal) Collected:  01/17/20 0234    Lab Status:  Final result Specimen:  Swab from Nasopharynx Updated:  01/17/20 0506     ADENOVIRUS, PCR Not Detected     Coronavirus 229E Not Detected     Coronavirus HKU1 Not Detected     Coronavirus NL63 Not Detected     Coronavirus OC43 Not Detected     Human Metapneumovirus Not Detected      Human Rhinovirus/Enterovirus Not Detected     Influenza B PCR Not Detected     Parainfluenza Virus 1 Not Detected     Parainfluenza Virus 2 Not Detected     Parainfluenza Virus 3 Not Detected     Parainfluenza Virus 4 Not Detected     Bordetella pertussis pcr Not Detected     Influenza A H1 2009 PCR Not Detected     Chlamydophila pneumoniae PCR Not Detected     Mycoplasma pneumo by PCR Not Detected     Influenza A PCR Not Detected     Influenza A H3 Not Detected     Influenza A H1 Not Detected     RSV, PCR Not Detected     Bordetella parapertussis PCR Not Detected    MRSA Screen, PCR - Swab, Nares [[id number]]  (Normal) Collected:  01/17/20 0234    Lab Status:  Final result Specimen:  Swab from Nares Updated:  01/17/20 0516     MRSA PCR No MRSA Detected    S. Pneumo Ag Urine or CSF - Urine, Urine, Catheter [[id number]]  (Normal) Collected:  01/17/20 0211    Lab Status:  Final result Specimen:  Urine, Catheter Updated:  01/17/20 0524     Strep Pneumo Ag Negative    Legionella Antigen, Urine - Urine, Urine, Catheter [[id number]]  (Normal) Collected:  01/17/20 0211    Lab Status:  Final result Specimen:  Urine, Catheter Updated:  01/17/20 0524     LEGIONELLA ANTIGEN, URINE Negative                albuterol sulfate HFA 6 puff Vent nebulization Q6H - RT   famotidine 20 mg Intravenous Q12H   insulin regular 0-7 Units Subcutaneous Q6H   LORazepam 2 mg Intravenous Once   methylPREDNISolone sodium succinate 40 mg Intravenous Q12H   metoprolol tartrate 2.5 mg Intravenous Q6H   piperacillin-tazobactam 3.375 g Intravenous Q8H   sodium chloride 10 mL Intravenous Q12H   valproate sodium 500 mg Intravenous Q8H       propofol 5-50 mcg/kg/min Last Rate: 35 mcg/kg/min (01/18/20 0418)       Diagnostics:  Ct Head Without Contrast    Result Date: 1/16/2020  CT HEAD WO CONTRAST-  INDICATIONS: Altered mental status  TECHNIQUE: Radiation dose reduction techniques were utilized, including automated exposure control and exposure modulation  based on body size. Noncontrast head CT  COMPARISON: None available  FINDINGS:    No acute intracranial hemorrhage, midline shift or mass effect. No acute territorial infarct is identified.  Mild periventricular hypodensities suggest chronic small vessel ischemic change in a patient this age.  Arterial calcifications are seen in the base of the brain.  Ventricles, cisterns, cerebral sulci are unremarkable for patient age.  The visualized paranasal sinuses, orbits, mastoid air cells are unremarkable.           No acute intracranial hemorrhage or hydrocephalus. If there is further clinical concern, MRI could be considered for further evaluation.  This report was finalized on 1/16/2020 7:12 PM by Dr. Porfirio Hayward M.D.      Ct Chest Without Contrast    Result Date: 1/16/2020  CT CHEST WO CONTRAST-  Radiation dose reduction techniques were utilized, including automated exposure control and exposure modulation based on body size.  Clinical: Shortness of breath  FINDINGS: There is diffuse thyroid enlargement consistent with goiter. There is an endotracheal tube in place, tip situated above the amira. Diameter of the thoracic aorta is within normal limits. There is cardiac enlargement no pericardial abnormality. Esophagus is satisfactory in course and caliber.  Mild to moderately enlarged mediastinal lymph nodes demonstrated. Along the medial right upper lobe, there is a subpleural curvilinear area of likely atelectasis with a similar area along the medial right middle lobe. At the base of the lingular segment left upper lobe there is a subtle wedge-shaped area of likely atelectasis as well. Adjacent to this location there is a 9 x 5 mm subpleural nodular density on image 43. Located along the medial left upper lobe, there is a small focus of subpleural atelectasis or consolidation. This measures approximately 2 cm.  There are bilateral areas of peripheral bronchial wall thickening suggesting bronchitis. No pleural  effusion is demonstrated. Incidentally noted L1 compression deformity.  CONCLUSION: 1. Endotracheal tube in position, tip 2.7 cm above the amira. 2. Goiter. 3. Peripheral small areas of atelectasis involving both lungs with a small focus of consolidation or atelectasis medial left upper lobe. 4. Mild to enlarged mediastinal lymph nodes. 5. Cardiomegaly. 6. Subtle areas of bronchial wall thickening suggestive of bronchitis.   This report was finalized on 1/16/2020 7:36 PM by Dr. Parag Joy M.D.      Xr Chest 1 View    Result Date: 1/18/2020  PORTABLE CHEST RADIOGRAPH  HISTORY: Respiratory failure  COMPARISON: 01/17/2020  FINDINGS: Endotracheal tube terminates in satisfactory position. Cardiac silhouette is unchanged. No pneumothorax is seen. Blunting of the costophrenic angles is noted bilaterally. Diffuse interstitial prominence appears unchanged.      No significant interval change.  This report was finalized on 1/18/2020 5:05 AM by Dr. Geraldine Matthews M.D.      Xr Chest 1 View    Result Date: 1/17/2020  CLINICAL HISTORY: 64-year-old female with respiratory failure. Intubated patient.  PORTABLE AP SEMIERECT CHEST DATED 01/17/2020 AT 0328 HOURS  FINDINGS: When compared to  image and multiplanar sections of CT chest exam 1 day ago 01/16/2020 at 1857 hours, and with review of the only other available chest imaging study at this facility, the portable AP supine chest radiograph of 01/16/2020 at 1711 hours, an endotracheal tube remains and lies with its tip projecting 3.2 cm above the amira, below the level of medial clavicles. No segmental or lobar collapse or consolidation is seen in the lungs. Left costophrenic angle is sharp. There is trace hazy to patchy blunting of right costophrenic angle that may be due to fluid or patchy atelectasis versus infiltrate. Crowded markings and some modest patchy opacity in the right lower lung zone are otherwise also demonstrated. The cardiac silhouette remains  borderline enlarged caliber. Monitoring lead wires are present. Fullness of upper mediastinal contours is compatible with the thyromegaly and lymph nodes demonstrated on the CT scan. No acute change in bony thorax is identified. No pneumothorax is seen.  CONCLUSION: Slightly prominent markings throughout the lungs and modest patchy opacity at the right lung base. Only slightly less prominent markings are present at the left lung base. Trace blunting at the right costophrenic angle is present. Endotracheal tube remains.  This report was finalized on 1/17/2020 10:32 AM by Dr. Roger Garcia M.D.      Xr Chest 1 View    Result Date: 1/16/2020  XR CHEST 1 VW-  HISTORY: Female who is 64 years-old,  short of breath, endotracheal intubation  TECHNIQUE: Frontal view of the chest  COMPARISON: None available  FINDINGS: Endotracheal tube tip is 3.2 cm above the amira. The heart is enlarged. Pulmonary vasculature is congested. Hazy opacity in the left mid to upper lung may be edema or developing infection, clinical correlation follow-up recommended. No pleural effusion, or pneumothorax is seen, limited by supine positioning. No acute osseous process.      Endotracheal intubation. Cardiomegaly with pulmonary vascular congestion. Hazy opacity in the left mid and upper lung. Follow-up recommended.  This report was finalized on 1/16/2020 5:27 PM by Dr. Porfirio Hayward M.D.           I have reviewed chest x-ray today and previous chest x-rays and CT chest and head since admission not sure I see a definite infiltrate.  ET okay    EKG looks like atrial fibrillation with rapid ventricular response    Active Hospital Problems    Diagnosis  POA   • Successful cardiopulmonary resuscitation [Z92.89]  Not Applicable      Resolved Hospital Problems   No resolved problems to display.         Assessment/Plan     1. Status post resuscitated cardiac arrest presumably this was an respiratory driven arrest.  2. Respiratory failure requiring  ventilatory support  I am going to try and wean her ventilator slightly and see how she does she is not currently assisting or overbreathing the ventilator at all.  3. Probable hypoxic/anoxic brain injury neurology following  4. Sepsis question source of this pneumonia CT chest only showed a little bit of what looks like atelectasis 1 little nodular area in the left this will need follow-up, cultures negative so far, patient is on Zosyn.  proCalcitonin has improved as has white count.  Continue antibiotics  5. Probable COPD with acute exacerbation bronchodilators she is on high-dose steroids will drop her down a little.  6. Atrial fibrillation with rapid ventricular response on beta-blocker her blood pressure is good a little high even I will up the beta-blocker see if we can control her rate.  I am going to start Lovenox as well  7. 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 Hypernatremia mild we will have to monitor this and I am starting tube feeds I will give a little bit of free water but do not want to drop her sodium too low.  9. Hyperthyroidism possible with increased free T4 and T3 in a low TSH ending on her clinical course here we may need to get endocrinology involved.  10. Fluids/electrolytes/nutrition and need to get some tube feeds started to protect gut integrity.  11. Fever patient has had a low-grade fever with her neuro changes we really would like to get her temperature at least to normal I am going to put her on a cooling blanket in addition to the Tylenol nursing has some ice packs in her armpits and groin.  At this point she does not appear to have any shivering.  That develops will treat with some Demerol if the fentanyl does not control hopefully we would not have to paralyze.    Plan for disposition:    Socrates Franklin MD  01/18/20  8:16 AM    Time: Critical  care time 47 minutes

## 2020-01-18 NOTE — PLAN OF CARE
Pt remains in the CCU on the vent with Propofol and unresponsive.  When Propofol is off, she breathes 44 tpm, 's, and BP's 200/100s.  There is no corneal reflex, she is not following anything purposeful, and shows signs of posturing with stimulation.  The poor prognosis was discussed with the family today (There are only 2 sons and 1 daughter) and they are leaning towards withdrawal of care possibly tomorrow.  They requested that no further interventions be done for now, except the sedation, until they can get the 3rd sibling here for discussion or his ok to proceed without him being here.

## 2020-01-18 NOTE — CONSULTS
Adult Nutrition  Assessment/PES    Patient Name:  Leonor Bhandari  YOB: 1955  MRN: [id number]  Admit Date:  1/16/2020    Assessment Date:  1/18/2020    Comments:  Nutrition assessment completed for Cortrak and TF's. RN asking to hold cortrak placement due to MD to meet with family and discuss plan. IF TF's are to start, rec Peptamen Intense goal at 40ml/hr and water flush 30cc q 4hr. Will continue to follow.    Reason for Assessment     Row Name 01/18/20 1107          Reason for Assessment    Reason For Assessment  physician consult;TF/PN     Diagnosis  -- cardiac arrest, respiratory faillure on vent, COPD, AFIB, Hypernatremia           Anthropometrics     Row Name 01/18/20 1111          Body Mass Index (BMI)    BMI Assessment  BMI 25-29.9: overweight         Labs/Tests/Procedures/Meds     Row Name 01/18/20 1111          Labs/Procedures/Meds    Lab Results Reviewed  reviewed     Lab Results Comments  na, glu, bun, alb, wbc        Diagnostic Tests/Procedures    Diagnostic Test/Procedure Reviewed  reviewed        Medications    Pertinent Medications Reviewed  reviewed     Pertinent Medications Comments  lovenox, pepcid, insulin, solu-medrol, abx, propofol         Physical Findings     Row Name 01/18/20 1112          Physical Findings    Overall Physical Appearance  on ventilator support     Skin  -- erlinda 8         Estimated/Assessed Needs     Row Name 01/18/20 1112          Calculation Measurements    Weight Used For Calculations  72.2 kg (159 lb 2.8 oz)        Estimated/Assessed Needs    Additional Documentation  Fluid Requirements (Group);Protein Requirements (Group);KCAL/KG (Group)        KCAL/KG    KCAL/KG  20 Kcal/Kg (kcal)     20 Kcal/Kg (kcal)  1444        Protein Requirements    Weight Used For Protein Calculations  72.2 kg (159 lb 2.8 oz)     Est Protein Requirement Amount (gms/kg)  1.0 gm protein     Estimated Protein Requirements (gms/day)  72.2        Fluid Requirements    Estimated Fluid  Requirements (mL/day)  1444     RDA Method (mL)  1444     Shannon-Segar Method (over 20 kg)  2944         Nutrition Prescription Ordered     Row Name 01/18/20 1112          Nutrition Prescription PO    Current PO Diet  NPO        Propofol Considerations    Propofol (mL/hr)  16.38 mL/hr     Propofol (Kcal/day)  432.43 Kcal/day                 Problem/Interventions:  Problem 1     Row Name 01/18/20 1114          Nutrition Diagnoses Problem 1    Problem 1  Needs Alternate Route     Etiology (related to)  Medical Diagnosis     Pulmonary/Critical Care  Acute respiratory failure;Ventilator     Signs/Symptoms (evidenced by)  NPO               Intervention Goal     Row Name 01/18/20 1115          Intervention Goal    General  Maintain nutrition     TF/PN  Inititiate TF/PN;Tolerate TF at goal     Transition  TF to PO     Weight  No significant weight loss         Nutrition Intervention     Row Name 01/18/20 1115          Nutrition Intervention    RD/Tech Action  Follow Tx progress;Care plan reviewd;Recommend/ordered     Recommended/Ordered  EN         Nutrition Prescription     Row Name 01/18/20 1115          Nutrition Prescription EN    Enteral Prescription  Enteral begin/change     Product  Peptamen Intense VHP     TF Delivery Method  Continuous     Continuous TF Goal Rate (mL/hr)  40 mL/hr     Water flush (mL)   30 mL     Water Flush Frequency  Every 4 hours             Electronically signed by:  Cleo Tompkins RD  01/18/20 1:28 PM

## 2020-01-18 NOTE — PROGRESS NOTES
Patient Identification:  NAME:  Leonor Bhandari  Age:  64 y.o.   Sex:  female   :  1955   MRN:  [id number]       Chief complaint: Coma and anoxic encephalopathy    History of present illness: She require some Diprivan otherwise she breathes 40 times per minute otherwise there is no spontaneous movement in the extremities no seizure activity I had a long talk with the family about and anoxic encephalopathy and her current lack of ability to regain consciousness EEG yesterday by my independent eyeball review showed a burst suppression pattern which is a very very poor prognosis for any functional recovery she is not in status epilepticus      Past medical history:  Past Medical History:   Diagnosis Date   • A-fib (CMS/[facility])    • Anxiety    • Asthma    • COPD (chronic obstructive pulmonary disease) (CMS/[facility])    • Depression    • Graves disease    • Hyperlipidemia    • Hypertension    • Hyperthyroidism        Allergies:  Ramipril    Home medications:  Medications Prior to Admission   Medication Sig Dispense Refill Last Dose   • albuterol sulfate  (90 Base) MCG/ACT inhaler Inhale 2 puffs 4 (Four) Times a Day.      • digoxin (LANOXIN) 250 MCG tablet Take 250 mcg by mouth Daily.      • dilTIAZem (CARDIZEM) 90 MG tablet Take 90 mg by mouth 4 (Four) Times a Day. With meals and at bedtime      • escitalopram (LEXAPRO) 20 MG tablet Take 20 mg by mouth Daily.      • metoprolol succinate XL (TOPROL-XL) 25 MG 24 hr tablet Take 25 mg by mouth 2 (Two) Times a Day.      • predniSONE (DELTASONE) 5 MG tablet Take 5-10 mg by mouth Daily.      • rOPINIRole (REQUIP) 1 MG tablet Take 1 mg by mouth Every Night. Take 1 hour before bedtime.      • warfarin (COUMADIN) 3 MG tablet Take 9 mg by mouth See Admin Instructions. Patient takes on thursday      • warfarin (COUMADIN) 3 MG tablet Take 6 mg by mouth See Admin Instructions. Patient takes everyday BUT thursday           Hospital medications:    albuterol sulfate HFA 6 puff  Vent nebulization Q6H - RT   digoxin 500 mcg Intravenous Once   enoxaparin 1 mg/kg Subcutaneous Q12H   famotidine 20 mg Intravenous Q12H   insulin regular 0-7 Units Subcutaneous Q6H   LORazepam 2 mg Intravenous Once   methylPREDNISolone sodium succinate 20 mg Intravenous Q12H   metoprolol tartrate 5 mg Intravenous Q6H   piperacillin-tazobactam 3.375 g Intravenous Q8H   sodium chloride 10 mL Intravenous Q12H   valproate sodium 500 mg Intravenous Q8H       propofol 5-50 mcg/kg/min Last Rate: 35 mcg/kg/min (01/18/20 1349)     •  acetaminophen  •  dextrose  •  dextrose  •  dextrose  •  dextrose  •  fentaNYL citrate (PF)  •  glucagon (human recombinant)  •  glucagon (human recombinant)  •  LORazepam  •  ondansetron **OR** ondansetron  •  sodium chloride      Objective:  Vitals Ranges:   Temp:  [99.5 °F (37.5 °C)-100.4 °F (38 °C)] 99.5 °F (37.5 °C)  Heart Rate:  [111-168] 129  Resp:  [23-42] 23  BP: (113-207)/() 151/85  FiO2 (%):  [26 %-41 %] 26 %      Physical Exam:  She is comatose pupils are without reaction no corneal responses no doll's eyes no withdrawal grimace or posturing to pain reflexes absent throughout toes mute bilaterally    Results review:   I reviewed the patient's new clinical results.    Data review:  Lab Results (last 24 hours)     Procedure Component Value Units Date/Time    Blood Culture - Blood, Arm, Left [[id number]] Collected:  01/18/20 0141    Specimen:  Blood from Arm, Left Updated:  01/18/20 1415     Blood Culture No growth at less than 24 hours    Blood Culture - Blood, Arm, Right [[id number]] Collected:  01/18/20 0126    Specimen:  Blood from Arm, Right Updated:  01/18/20 1415     Blood Culture No growth at less than 24 hours    POC Glucose Once [[id number]]  (Abnormal) Collected:  01/18/20 1303    Specimen:  Blood Updated:  01/18/20 1304     Glucose 152 mg/dL     Respiratory Culture - Sputum, Cough [[id number]] Collected:  01/18/20 0852    Specimen:  Sputum from Cough Updated:  01/18/20  "0938    POC Glucose Once [[id number]]  (Abnormal) Collected:  01/18/20 0335    Specimen:  Blood Updated:  01/18/20 0336     Glucose 201 mg/dL     Procalcitonin [[id number]]  (Abnormal) Collected:  01/18/20 0126    Specimen:  Blood Updated:  01/18/20 0300     Procalcitonin 4.86 ng/mL     Narrative:       As a Marker for Sepsis (Non-Neonates):   1. <0.5 ng/mL represents a low risk of severe sepsis and/or septic shock.  1. >2 ng/mL represents a high risk of severe sepsis and/or septic shock.    As a Marker for Lower Respiratory Tract Infections that require antibiotic therapy:  PCT on Admission     Antibiotic Therapy             6-12 Hrs later  > 0.5                Strongly Recommended            >0.25 - <0.5         Recommended  0.1 - 0.25           Discouraged                   Remeasure/reassess PCT  <0.1                 Strongly Discouraged          Remeasure/reassess PCT      As 28 day mortality risk marker: \"Change in Procalcitonin Result\" (> 80 % or <=80 %) if Day 0 (or Day 1) and Day 4 values are available. Refer to http://[email or web address].coms-pct-calculator.com/   Change in PCT <=80 %   A decrease of PCT levels below or equal to 80 % defines a positive change in PCT test result representing a higher risk for 28-day all-cause mortality of patients diagnosed with severe sepsis or septic shock.  Change in PCT > 80 %   A decrease of PCT levels of more than 80 % defines a negative change in PCT result representing a lower risk for 28-day all-cause mortality of patients diagnosed with severe sepsis or septic shock.                Results may be falsely decreased if patient taking Biotin.     BNP [[id number]]  (Abnormal) Collected:  01/18/20 0126    Specimen:  Blood Updated:  01/18/20 0259     proBNP 2,439.0 pg/mL     Narrative:       Among patients with dyspnea, NT-proBNP is highly sensitive for the detection of acute congestive heart failure. In addition NT-proBNP of <300 pg/ml effectively rules out acute congestive heart " failure with 99% negative predictive value.    Results may be falsely decreased if patient taking Biotin.      Renal Function Panel [[id number]]  (Abnormal) Collected:  01/18/20 0126    Specimen:  Blood Updated:  01/18/20 0253     Glucose 216 mg/dL      BUN 35 mg/dL      Creatinine 0.75 mg/dL      Sodium 147 mmol/L      Potassium 3.7 mmol/L      Chloride 111 mmol/L      CO2 19.3 mmol/L      Calcium 8.8 mg/dL      Albumin 3.00 g/dL      Phosphorus 3.2 mg/dL      Anion Gap 16.7 mmol/L      BUN/Creatinine Ratio 46.7     eGFR Non African Amer 78 mL/min/1.73     Narrative:       GFR Normal >60  Chronic Kidney Disease <60  Kidney Failure <15      Protime-INR [[id number]]  (Abnormal) Collected:  01/18/20 0126    Specimen:  Blood Updated:  01/18/20 0234     Protime 20.7 Seconds      INR 1.82    Lactic Acid, Plasma [[id number]]  (Normal) Collected:  01/18/20 0126    Specimen:  Blood Updated:  01/18/20 0233     Lactate 2.0 mmol/L     CBC & Differential [[id number]] Collected:  01/18/20 0126    Specimen:  Blood Updated:  01/18/20 0218    Narrative:       The following orders were created for panel order CBC & Differential.  Procedure                               Abnormality         Status                     ---------                               -----------         ------                     CBC Auto Differential[[id number]]        Abnormal            Final result                 Please view results for these tests on the individual orders.    CBC Auto Differential [[id number]]  (Abnormal) Collected:  01/18/20 0126    Specimen:  Blood Updated:  01/18/20 0218     WBC 19.31 10*3/mm3      RBC 5.05 10*6/mm3      Hemoglobin 13.2 g/dL      Hematocrit 40.7 %      MCV 80.6 fL      MCH 26.1 pg      MCHC 32.4 g/dL      RDW 16.8 %      RDW-SD 47.9 fl      MPV 9.9 fL      Platelets 312 10*3/mm3      Neutrophil % 86.4 %      Lymphocyte % 3.9 %      Monocyte % 8.8 %      Eosinophil % 0.0 %      Basophil % 0.2 %      Immature Grans % 0.7 %       Neutrophils, Absolute 16.71 10*3/mm3      Lymphocytes, Absolute 0.75 10*3/mm3      Monocytes, Absolute 1.69 10*3/mm3      Eosinophils, Absolute 0.00 10*3/mm3      Basophils, Absolute 0.03 10*3/mm3      Immature Grans, Absolute 0.13 10*3/mm3      nRBC 0.0 /100 WBC     Blood Gas, Arterial [[id number]]  (Abnormal) Collected:  01/18/20 0200    Specimen:  Arterial Blood Updated:  01/18/20 0205     Site Arterial: right radial     Navid's Test Positive     pH, Arterial 7.372 pH units      pCO2, Arterial 41.1 mm Hg      pO2, Arterial 120.2 mm Hg      HCO3, Arterial 23.8 mmol/L      Base Excess, Arterial -1.4 mmol/L      O2 Saturation Calculated 98.6 %      A-a Gradiant 0.5 mmHg      Barometric Pressure for Blood Gas 756.0 mmHg      Modality Adult Vent     FIO2 40 %      Ventilator Mode AC     Set Tidal Volume 500     Set Mech Resp Rate 30     Rate 30 Breaths/minute      PEEP 8    POC Glucose Once [[id number]]  (Abnormal) Collected:  01/17/20 2352    Specimen:  Blood Updated:  01/17/20 2353     Glucose 218 mg/dL     POC Glucose Once [[id number]]  (Abnormal) Collected:  01/17/20 2046    Specimen:  Blood Updated:  01/17/20 2047     Glucose 178 mg/dL     POC Glucose Once [[id number]]  (Abnormal) Collected:  01/17/20 1625    Specimen:  Blood Updated:  01/17/20 1629     Glucose 170 mg/dL            Imaging:  Imaging Results (Last 24 Hours)     Procedure Component Value Units Date/Time    XR Chest 1 View [[id number]] Collected:  01/18/20 0504     Updated:  01/18/20 0508    Narrative:       PORTABLE CHEST RADIOGRAPH     HISTORY: Respiratory failure     COMPARISON: 01/17/2020     FINDINGS:  Endotracheal tube terminates in satisfactory position. Cardiac  silhouette is unchanged. No pneumothorax is seen. Blunting of the  costophrenic angles is noted bilaterally. Diffuse interstitial  prominence appears unchanged.       Impression:       No significant interval change.     This report was finalized on 1/18/2020 5:05 AM by Dr. Chandler  KRANTHI Matthews                Assessment and Plan:       Successful cardiopulmonary resuscitation    She remains comatose with evidence of an anoxic encephalopathy and central neurogenic hyperventilation.  She has lost her corneal responses and at this point appears to have no chance for functional recovery I have had a long talk with the daughter and son and they are probably talking about terminal wean possibly even tomorrow but we will see how she does overnight and they are taking this 1 day at a time she is DNR      Jose Alberto Justice MD  01/18/20  2:27 PM

## 2020-01-19 NOTE — PROGRESS NOTES
Patient's 3 children are here.  They have decided that they would like to pursue a palliative course apparently she is left explicit instructions that she would not want to be kept alive in this condition.  This seems perfectly appropriate to me given her condition and extremely poor prognosis.  We discussed what they can expect and palliative care patient will be extubated to palliative care.

## 2020-01-19 NOTE — PLAN OF CARE
Problem: Patient Care Overview  Goal: Plan of Care Review  Outcome: Ongoing (interventions implemented as appropriate)  Flowsheets (Taken 1/19/2020 0615)  Progress: no change  Plan of Care Reviewed With: patient  Outcome Summary: Pt responds only in the LE with extention from pain. No response with UE's. Corneal reflexes absent, had a low grade fever for part of the night, but treated with ice packs and controlled. Given one dose of digoxin and metoprolol d/t HR above 160's continuously. Urine output adequate. One dose of fentanyl given. skin warm and clammy. CTM.

## 2020-01-19 NOTE — PROGRESS NOTES
LOS: 3 days   Patient Care Team:  Reyes, Miriam Mercado, MD as PCP - General (Family Medicine)    Subjective   Patient remains unresponsive.  Family spoke with neurology and cardiology yesterday.  Neurology reports that the EEG showed a burst suppression pattern and feel the prognosis is extremely poor.  Family declined pacemaker placement of Dobbhoff feeding tube after this discussion yesterday.  Family is not here yet today I am told some are flying in this afternoon and the plan is to withdraw care      Review of Systems:          Objective     Vital Signs  Vital Sign Min/Max for last 24 hours  Temp  Min: 99.5 °F (37.5 °C)  Max: 100.4 °F (38 °C)   BP  Min: 110/76  Max: 207/119   Pulse  Min: 111  Max: 168   Resp  Min: 22  Max: 28   SpO2  Min: 91 %  Max: 99 %   No data recorded   Weight  Min: 71.7 kg (158 lb 1.1 oz)  Max: 72.1 kg (159 lb)        Ventilator/Non-Invasive Ventilation Settings (From admission, onward)     Start     Ordered    01/16/20 1931  Ventilator - AC/PC; 20; 60; 90%; 5; 26  Continuous     Question Answer Comment   Vent Mode AC/PC    Breath rate 20    FiO2 60    FiO2 titrate for Sp02% =/> 90%    PEEP 5    Inspiratory Pressure 26        01/16/20 1930                             Body mass index is 28.91 kg/m².  I/O last 3 completed shifts:  In: 1325 [I.V.:1125; IV Piggyback:200]  Out: 1775 [Urine:1775]  No intake/output data recorded.        Physical Exam:  General Appearance: Overweight white female resting in bed she is sedated with propofol at 35 mics she is unresponsive to verbal stimuli.  She does not look in acute distress.  She is on the ventilator assist control of 20 tidal volume 500 PEEP of 5 FiO2 21% SPO2 94% and she is breathing 32.  Eyes: Conjunctiva are clear and anicteric pupils are about 3 mm they do not react to light.,  There are no corneal reflexes but doll's eyes present  ENT: Mucous membranes are little dry no erythema or exudates she has a 7 oral endotracheal tube at  about 24 cm at the lips  Neck: Trachea midline no visible jugular venous distention  Lungs: Pretty clear no wheezes no rales symmetric expansion.    Cardiac: Irregularly irregular heart rates in the 120s on the monitor it appears to be atrial fibrillation there is no murmur appreciated  Abdomen: Soft nontender no palpable hepatosplenomegaly or masses  : Wagner catheter dependent drainage urine is getting a little concentrated appearing  Musculoskeletal: She has a little bit of edema both hands and feet otherwise unremarkable  Skin: Warm dry no jaundice no petechiae  Neuro: She is not responsive to verbal stimuli she does seem to open her eyes to nailbed pressure I did not get any withdrawal to deep nailbed pressure in either upper extremity or lower extremities.  Extremities/P Vascular: Palpable radial and dorsalis pedis pulses bilaterally  MSE: Unable to assess       Labs:  Results from last 7 days   Lab Units 01/19/20  0444 01/18/20  0126 01/17/20  0455 01/16/20  2034 01/16/20  1721   GLUCOSE mg/dL 99 216* 212* 139* 173*   SODIUM mmol/L 157* 147* 146* 145 138   POTASSIUM mmol/L 3.8 3.7 4.3 4.1 6.4*   MAGNESIUM mg/dL 2.4  --  1.9  --  2.8*   CO2 mmol/L 23.1 19.3* 19.5* 25.8 17.3*   CHLORIDE mmol/L 119* 111* 107 104 95*   ANION GAP mmol/L 14.9 16.7* 19.5* 15.2* 25.7*   CREATININE mg/dL 0.60 0.75 0.72 0.66 0.70   BUN mg/dL 28* 35* 22 11 6*   BUN / CREAT RATIO  46.7* 46.7* 30.6* 16.7 8.6   CALCIUM mg/dL 9.2 8.8 8.7 9.2 10.6*   EGFR IF NONAFRICN AM mL/min/1.73 101 78 82 91 85   ALK PHOS U/L 65  --  95  --  135*   TOTAL PROTEIN g/dL 6.5  --  6.5  --  7.3   ALT (SGPT) U/L 24  --  44*  --  24   AST (SGOT) U/L 62*  --  91*  --  54*   BILIRUBIN mg/dL 0.5  --  0.4  --  0.2   ALBUMIN g/dL 3.30* 3.00* 3.10*  --  3.60   GLOBULIN gm/dL 3.2  --  3.4  --  3.7     Estimated Creatinine Clearance: 90.1 mL/min (by C-G formula based on SCr of 0.6 mg/dL).      Results from last 7 days   Lab Units 01/19/20  0444 01/18/20  0126  01/17/20  0455 01/16/20  1721   WBC 10*3/mm3 34.01* 19.31* 27.07* 21.11*   RBC 10*6/mm3 5.19 5.05 5.55* 5.63*   HEMOGLOBIN g/dL 13.4 13.2 13.8 14.5   HEMATOCRIT % 42.1 40.7 43.9 47.6*   MCV fL 81.1 80.6 79.1 84.5   MCH pg 25.8* 26.1* 24.9* 25.8*   MCHC g/dL 31.8 32.4 31.4* 30.5*   RDW % 17.1* 16.8* 16.3* 16.4*   RDW-SD fl 48.9 47.9 46.1 49.0   MPV fL 10.5 9.9 9.6 10.3   PLATELETS 10*3/mm3 305 312 388 390   NEUTROPHIL % %  --  86.4* 93.0*  --    LYMPHOCYTE % %  --  3.9* 2.1*  --    MONOCYTES % %  --  8.8 3.9*  --    EOSINOPHIL % %  --  0.0* 0.0*  --    BASOPHIL % %  --  0.2 0.2  --    IMM GRAN % %  --  0.7* 0.8*  --    NEUTROS ABS 10*3/mm3  --  16.71* 25.16* 9.71*   LYMPHS ABS 10*3/mm3  --  0.75 0.57*  --    MONOS ABS 10*3/mm3  --  1.69* 1.05*  --    EOS ABS 10*3/mm3  --  0.00 0.00 0.42*   BASOS ABS 10*3/mm3  --  0.03 0.06  --    IMMATURE GRANS (ABS) 10*3/mm3  --  0.13* 0.23*  --    NRBC /100 WBC  --  0.0 0.0  --      Results from last 7 days   Lab Units 01/18/20  0200   PH, ARTERIAL pH units 7.372   PO2 ART mm Hg 120.2*   PCO2, ARTERIAL mm Hg 41.1   HCO3 ART mmol/L 23.8     Results from last 7 days   Lab Units 01/16/20  1721   TROPONIN T ng/mL <0.010     Results from last 7 days   Lab Units 01/18/20  0126 01/17/20  0455 01/16/20  1721   PROBNP pg/mL 2,439.0* 1,474.0* 1,747.0*     Results from last 7 days   Lab Units 01/16/20  1721   TSH uIU/mL <0.005*   T3 FREE pg/mL 16.60*   FREE T4 ng/dL 2.21*     Results from last 7 days   Lab Units 01/18/20  0126 01/17/20  0455   LACTATE mmol/L 2.0  --    PROCALCITONIN ng/mL 4.86* 6.20*     Results from last 7 days   Lab Units 01/19/20  0444 01/18/20  0126 01/17/20  0455 01/16/20  1721   INR  1.33* 1.82* 2.77* 2.75*     Microbiology Results (last 10 days)     Procedure Component Value - Date/Time    Respiratory Culture - Sputum, Cough [[id number]] Collected:  01/18/20 0852    Lab Status:  Preliminary result Specimen:  Sputum from Cough Updated:  01/18/20 6817     Gram Stain No  Epithelial cells seen      No WBCs seen      Occasional Gram positive cocci in pairs    Blood Culture - Blood, Arm, Left [[id number]] Collected:  01/18/20 0141    Lab Status:  Preliminary result Specimen:  Blood from Arm, Left Updated:  01/19/20 0215     Blood Culture No growth at 24 hours    Blood Culture - Blood, Arm, Right [[id number]] Collected:  01/18/20 0126    Lab Status:  Preliminary result Specimen:  Blood from Arm, Right Updated:  01/19/20 0215     Blood Culture No growth at 24 hours    Respiratory Panel, PCR - Swab, Nasopharynx [[id number]]  (Normal) Collected:  01/17/20 0234    Lab Status:  Final result Specimen:  Swab from Nasopharynx Updated:  01/17/20 0506     ADENOVIRUS, PCR Not Detected     Coronavirus 229E Not Detected     Coronavirus HKU1 Not Detected     Coronavirus NL63 Not Detected     Coronavirus OC43 Not Detected     Human Metapneumovirus Not Detected     Human Rhinovirus/Enterovirus Not Detected     Influenza B PCR Not Detected     Parainfluenza Virus 1 Not Detected     Parainfluenza Virus 2 Not Detected     Parainfluenza Virus 3 Not Detected     Parainfluenza Virus 4 Not Detected     Bordetella pertussis pcr Not Detected     Influenza A H1 2009 PCR Not Detected     Chlamydophila pneumoniae PCR Not Detected     Mycoplasma pneumo by PCR Not Detected     Influenza A PCR Not Detected     Influenza A H3 Not Detected     Influenza A H1 Not Detected     RSV, PCR Not Detected     Bordetella parapertussis PCR Not Detected    MRSA Screen, PCR - Swab, Nares [[id number]]  (Normal) Collected:  01/17/20 0234    Lab Status:  Final result Specimen:  Swab from Nares Updated:  01/17/20 0516     MRSA PCR No MRSA Detected    S. Pneumo Ag Urine or CSF - Urine, Urine, Catheter [[id number]]  (Normal) Collected:  01/17/20 0211    Lab Status:  Final result Specimen:  Urine, Catheter Updated:  01/17/20 0524     Strep Pneumo Ag Negative    Legionella Antigen, Urine - Urine, Urine, Catheter [[id number]]  (Normal) Collected:   01/17/20 0211    Lab Status:  Final result Specimen:  Urine, Catheter Updated:  01/17/20 0524     LEGIONELLA ANTIGEN, URINE Negative                albuterol sulfate HFA 6 puff Vent nebulization Q6H - RT   enoxaparin 1 mg/kg Subcutaneous Q12H   famotidine 20 mg Intravenous Q12H   insulin regular 0-7 Units Subcutaneous Q6H   LORazepam 2 mg Intravenous Once   methylPREDNISolone sodium succinate 20 mg Intravenous Q12H   metoprolol tartrate 5 mg Intravenous Q6H   piperacillin-tazobactam 3.375 g Intravenous Q8H   sodium chloride 10 mL Intravenous Q12H   valproate sodium 500 mg Intravenous Q8H       propofol 5-50 mcg/kg/min Last Rate: 35 mcg/kg/min (01/19/20 0326)       Diagnostics:  Ct Head Without Contrast    Result Date: 1/16/2020  CT HEAD WO CONTRAST-  INDICATIONS: Altered mental status  TECHNIQUE: Radiation dose reduction techniques were utilized, including automated exposure control and exposure modulation based on body size. Noncontrast head CT  COMPARISON: None available  FINDINGS:    No acute intracranial hemorrhage, midline shift or mass effect. No acute territorial infarct is identified.  Mild periventricular hypodensities suggest chronic small vessel ischemic change in a patient this age.  Arterial calcifications are seen in the base of the brain.  Ventricles, cisterns, cerebral sulci are unremarkable for patient age.  The visualized paranasal sinuses, orbits, mastoid air cells are unremarkable.           No acute intracranial hemorrhage or hydrocephalus. If there is further clinical concern, MRI could be considered for further evaluation.  This report was finalized on 1/16/2020 7:12 PM by Dr. Porfirio Hayward M.D.      Ct Chest Without Contrast    Result Date: 1/16/2020  CT CHEST WO CONTRAST-  Radiation dose reduction techniques were utilized, including automated exposure control and exposure modulation based on body size.  Clinical: Shortness of breath  FINDINGS: There is diffuse thyroid enlargement  consistent with goiter. There is an endotracheal tube in place, tip situated above the amira. Diameter of the thoracic aorta is within normal limits. There is cardiac enlargement no pericardial abnormality. Esophagus is satisfactory in course and caliber.  Mild to moderately enlarged mediastinal lymph nodes demonstrated. Along the medial right upper lobe, there is a subpleural curvilinear area of likely atelectasis with a similar area along the medial right middle lobe. At the base of the lingular segment left upper lobe there is a subtle wedge-shaped area of likely atelectasis as well. Adjacent to this location there is a 9 x 5 mm subpleural nodular density on image 43. Located along the medial left upper lobe, there is a small focus of subpleural atelectasis or consolidation. This measures approximately 2 cm.  There are bilateral areas of peripheral bronchial wall thickening suggesting bronchitis. No pleural effusion is demonstrated. Incidentally noted L1 compression deformity.  CONCLUSION: 1. Endotracheal tube in position, tip 2.7 cm above the amira. 2. Goiter. 3. Peripheral small areas of atelectasis involving both lungs with a small focus of consolidation or atelectasis medial left upper lobe. 4. Mild to enlarged mediastinal lymph nodes. 5. Cardiomegaly. 6. Subtle areas of bronchial wall thickening suggestive of bronchitis.   This report was finalized on 1/16/2020 7:36 PM by Dr. Parag Joy M.D.      Xr Chest 1 View    Result Date: 1/18/2020  PORTABLE CHEST RADIOGRAPH  HISTORY: Respiratory failure  COMPARISON: 01/17/2020  FINDINGS: Endotracheal tube terminates in satisfactory position. Cardiac silhouette is unchanged. No pneumothorax is seen. Blunting of the costophrenic angles is noted bilaterally. Diffuse interstitial prominence appears unchanged.      No significant interval change.  This report was finalized on 1/18/2020 5:05 AM by Dr. Geraldine Matthews M.D.      Xr Chest 1 View    Result Date:  1/17/2020  CLINICAL HISTORY: 64-year-old female with respiratory failure. Intubated patient.  PORTABLE AP SEMIERECT CHEST DATED 01/17/2020 AT 0328 HOURS  FINDINGS: When compared to  image and multiplanar sections of CT chest exam 1 day ago 01/16/2020 at 1857 hours, and with review of the only other available chest imaging study at this facility, the portable AP supine chest radiograph of 01/16/2020 at 1711 hours, an endotracheal tube remains and lies with its tip projecting 3.2 cm above the amira, below the level of medial clavicles. No segmental or lobar collapse or consolidation is seen in the lungs. Left costophrenic angle is sharp. There is trace hazy to patchy blunting of right costophrenic angle that may be due to fluid or patchy atelectasis versus infiltrate. Crowded markings and some modest patchy opacity in the right lower lung zone are otherwise also demonstrated. The cardiac silhouette remains borderline enlarged caliber. Monitoring lead wires are present. Fullness of upper mediastinal contours is compatible with the thyromegaly and lymph nodes demonstrated on the CT scan. No acute change in bony thorax is identified. No pneumothorax is seen.  CONCLUSION: Slightly prominent markings throughout the lungs and modest patchy opacity at the right lung base. Only slightly less prominent markings are present at the left lung base. Trace blunting at the right costophrenic angle is present. Endotracheal tube remains.  This report was finalized on 1/17/2020 10:32 AM by Dr. Roger Garcia M.D.      Xr Chest 1 View    Result Date: 1/16/2020  XR CHEST 1 VW-  HISTORY: Female who is 64 years-old,  short of breath, endotracheal intubation  TECHNIQUE: Frontal view of the chest  COMPARISON: None available  FINDINGS: Endotracheal tube tip is 3.2 cm above the amira. The heart is enlarged. Pulmonary vasculature is congested. Hazy opacity in the left mid to upper lung may be edema or developing infection, clinical  correlation follow-up recommended. No pleural effusion, or pneumothorax is seen, limited by supine positioning. No acute osseous process.      Endotracheal intubation. Cardiomegaly with pulmonary vascular congestion. Hazy opacity in the left mid and upper lung. Follow-up recommended.  This report was finalized on 1/16/2020 5:27 PM by Dr. Porfirio Hayward M.D.           I have reviewed chest x-ray today and previous chest x-rays and CT chest and head since admission not sure I see a definite infiltrate.  ET okay    EKG looks like atrial fibrillation with rapid ventricular response    Active Hospital Problems    Diagnosis  POA   • Successful cardiopulmonary resuscitation [Z92.89]  Not Applicable      Resolved Hospital Problems   No resolved problems to display.         Assessment/Plan     1. Status post resuscitated cardiac arrest presumably this was an respiratory driven arrest.  2. Respiratory failure requiring ventilatory support will continue current support awaiting family's arrival  3. Probable hypoxic/anoxic brain injury neurology following looks like poor neurologic prognosis.  Neurology spoke with the family yesterday and apparently family is in route from out of town with the plan to extubate to palliative care later today.  4. Sepsis question source of this pneumonia CT chest only showed a little bit of what looks like atelectasis 1 little nodular area in the left this will need follow-up, cultures negative so far, patient is on Zosyn.  proCalcitonin has improved as has white count.  Continue antibiotics  5. Probable COPD with acute exacerbation bronchodilators she is on steroids.  6. Atrial fibrillation with rapid ventricular response cardiology help appreciated rate slightly better.  Cardiology suspects very difficult to control with her neuro status.  7. 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  8. Hypernatremia significantly worsened but family declined tube and therapy for this.  9. Hyperthyroidism possible with increased free T4 and T3 in a low TSH ending on her clinical course here we may need to get endocrinology involved if family changes their mind on management this also may be driving her A. fib rate some.  10. Fluids/electrolytes/nutrition family declined feeding tube for nutrition gut protection and free water..  11. Fever patient has had a low-grade fever with her neuro changes we really would like to get her temperature at least to normal only family declined cooling blankets etc. yesterday as well    Plan for disposition: Awaiting family's arrival apparently they have decided to pursue a palliative course when all the children arrive from out of town and declined further escalation of therapy yesterday    Socrates Franklin MD  01/19/20  8:06 AM    Time: Critical care time 33 minutes thus far

## 2020-01-19 NOTE — PROGRESS NOTES
Patient Identification:  NAME:  Leonor Bhandari  Age:  64 y.o.   Sex:  female   :  1955   MRN:  [id number]       Chief complaint: Coma, and anoxic encephalopathy    History of present illness: He was just extubated per her own previous wishes.  She has central neurogenic hyperventilation no seizure activity no spontaneous movement in the extremities eyes are open and she is staring straight ahead.  Son and daughter both in the room and having a very hard time as would be expected with all of this.  They are happy that she is extubated and I agree      Past medical history:  Past Medical History:   Diagnosis Date   • A-fib (CMS/[facility])    • Anxiety    • Asthma    • COPD (chronic obstructive pulmonary disease) (CMS/[facility])    • Depression    • Graves disease    • Hyperlipidemia    • Hypertension    • Hyperthyroidism        Allergies:  Ramipril    Home medications:  Medications Prior to Admission   Medication Sig Dispense Refill Last Dose   • albuterol sulfate  (90 Base) MCG/ACT inhaler Inhale 2 puffs 4 (Four) Times a Day.      • digoxin (LANOXIN) 250 MCG tablet Take 250 mcg by mouth Daily.      • dilTIAZem (CARDIZEM) 90 MG tablet Take 90 mg by mouth 4 (Four) Times a Day. With meals and at bedtime      • escitalopram (LEXAPRO) 20 MG tablet Take 20 mg by mouth Daily.      • metoprolol succinate XL (TOPROL-XL) 25 MG 24 hr tablet Take 25 mg by mouth 2 (Two) Times a Day.      • predniSONE (DELTASONE) 5 MG tablet Take 5-10 mg by mouth Daily.      • rOPINIRole (REQUIP) 1 MG tablet Take 1 mg by mouth Every Night. Take 1 hour before bedtime.      • warfarin (COUMADIN) 3 MG tablet Take 9 mg by mouth See Admin Instructions. Patient takes on thursday      • warfarin (COUMADIN) 3 MG tablet Take 6 mg by mouth See Admin Instructions. Patient takes everyday BUT thursday           Hospital medications:        •  acetaminophen **OR** acetaminophen **OR** acetaminophen  •  diphenhydrAMINE **OR** diphenhydrAMINE  •   diphenoxylate-atropine  •  Glycerin-Hypromellose-  •  glycopyrrolate **OR** glycopyrrolate **OR** glycopyrrolate **OR** glycopyrrolate  •  HYDROmorphone **OR** Morphine **OR** morphine  •  HYDROmorphone **OR** Morphine **OR** morphine  •  HYDROmorphone **OR** Morphine **OR** morphine  •  LORazepam **OR** LORazepam  •  LORazepam **OR** LORazepam  •  LORazepam **OR** LORazepam  •  magic mouthwash  •  promethazine **OR** promethazine **OR** promethazine **OR** promethazine  •  promethazine **OR** promethazine **OR** promethazine **OR** promethazine  •  Scopolamine      Objective:  Vitals Ranges:   Heart Rate:  [111-161] 140  Resp:  [22-28] 25  BP: (110-186)/() 147/87  FiO2 (%):  [21 %-26 %] 22 %      Physical Exam:  Comatose eyes open pupils react slightly to light no blink to threat no pain was given.  Breathing about 30 times per minute reflexes absent throughout toes mute bilaterally    Results review:   I reviewed the patient's new clinical results.    Data review:  Lab Results (last 24 hours)     Procedure Component Value Units Date/Time    POC Glucose Once [[id number]]  (Normal) Collected:  01/19/20 1108    Specimen:  Blood Updated:  01/19/20 1110     Glucose 83 mg/dL     Respiratory Culture - Sputum, Cough [[id number]] Collected:  01/18/20 0852    Specimen:  Sputum from Cough Updated:  01/19/20 1104     Respiratory Culture Light growth (2+) Normal Respiratory Ujdy     Gram Stain No Epithelial cells seen      No WBCs seen      Occasional Gram positive cocci in pairs    POC Glucose Once [[id number]]  (Normal) Collected:  01/19/20 0609    Specimen:  Blood Updated:  01/19/20 0610     Glucose 85 mg/dL     Magnesium [[id number]]  (Normal) Collected:  01/19/20 0444    Specimen:  Blood Updated:  01/19/20 0556     Magnesium 2.4 mg/dL     Comprehensive Metabolic Panel [[id number]]  (Abnormal) Collected:  01/19/20 0444    Specimen:  Blood Updated:  01/19/20 0555     Glucose 99 mg/dL      BUN 28 mg/dL       Creatinine 0.60 mg/dL      Sodium 157 mmol/L      Potassium 3.8 mmol/L      Chloride 119 mmol/L      CO2 23.1 mmol/L      Calcium 9.2 mg/dL      Total Protein 6.5 g/dL      Albumin 3.30 g/dL      ALT (SGPT) 24 U/L      AST (SGOT) 62 U/L      Alkaline Phosphatase 65 U/L      Total Bilirubin 0.5 mg/dL      eGFR Non African Amer 101 mL/min/1.73      Globulin 3.2 gm/dL      A/G Ratio 1.0 g/dL      BUN/Creatinine Ratio 46.7     Anion Gap 14.9 mmol/L     Narrative:       GFR Normal >60  Chronic Kidney Disease <60  Kidney Failure <15      Phosphorus [[id number]]  (Normal) Collected:  01/19/20 0444    Specimen:  Blood Updated:  01/19/20 0548     Phosphorus 2.5 mg/dL     Protime-INR [[id number]]  (Abnormal) Collected:  01/19/20 0444    Specimen:  Blood Updated:  01/19/20 0532     Protime 16.1 Seconds      INR 1.33    CBC (No Diff) [[id number]]  (Abnormal) Collected:  01/19/20 0444    Specimen:  Blood Updated:  01/19/20 0532     WBC 34.01 10*3/mm3      RBC 5.19 10*6/mm3      Hemoglobin 13.4 g/dL      Hematocrit 42.1 %      MCV 81.1 fL      MCH 25.8 pg      MCHC 31.8 g/dL      RDW 17.1 %      RDW-SD 48.9 fl      MPV 10.5 fL      Platelets 305 10*3/mm3     Blood Culture - Blood, Arm, Left [[id number]] Collected:  01/18/20 0141    Specimen:  Blood from Arm, Left Updated:  01/19/20 0215     Blood Culture No growth at 24 hours    Blood Culture - Blood, Arm, Right [[id number]] Collected:  01/18/20 0126    Specimen:  Blood from Arm, Right Updated:  01/19/20 0215     Blood Culture No growth at 24 hours    POC Glucose Once [[id number]]  (Normal) Collected:  01/18/20 2059    Specimen:  Blood Updated:  01/18/20 2101     Glucose 128 mg/dL            Imaging:  Imaging Results (Last 24 Hours)     ** No results found for the last 24 hours. **             Assessment and Plan:     She persists with vegetative state and has been extubated with deep irregular central neurogenic hyperventilation consistent with anoxic encephalopathy which is  irreversible we are keeping her comfortable and she is going to palliative I will not follow-up any further thanks    Jose Alberto Justice MD  01/19/20  1:21 PM

## 2020-01-19 NOTE — DISCHARGE SUMMARY
DisCharge/death summary    Diagnoses:  1. Resuscitated cardiopulmonary arrest  2. Severe anoxic encephalopathy  3. Atrial fibrillation with rapid ventricular response  4. Respiratory failure acute  5. COPD with acute exacerbation  6. Hyperthyroidism  7. Hypernatremia  8. Sepsis  9. Pneumonia probable aspiration    Hospital course patient was an out of hospital arrest she was resuscitated placed on a ventilator she did have atrial fibrillation back rapid ventricular response she had hypoxia and probable COPD.  She developed fever and suggestion of infection and sepsis placed on antibiotics unfortunately her neuro status did not improve she developed myoclonus and continued neurologic deterioration EEGs showed burst suppression pattern.  Neurology spoke with the family her children and they said she had advanced directives and had told them always she never want to be kept alive if she was not going to be normal i.e. not significantly brain-damaged.  The family requested that we discontinue all life support and allow natural death which was done.

## 2020-01-20 LAB
BACTERIA SPEC RESP CULT: NORMAL
GRAM STN SPEC: NORMAL

## 2020-01-20 NOTE — PROGRESS NOTES
Discharge Planning Assessment  [facility]     Patient Name: Leonor Bhandari  MRN: [id number]  Today's Date: 2020    Admit Date: 2020    Discharge Needs Assessment    No documentation.       Discharge Plan     Row Name 20 1627       Plan    Plan Comments  The patient transferred to [facility] from [facility]U on 2020. The patient was palliative. The patient  on 2020 @ 19:20. ETIENNE Rodríguez RN, CCP.     Final Discharge Disposition Code  20 -     Final Note  The patient  on 2020 @ 19:20. ETIENNE Rodríguez RN, CCP.         Destination      Coordination has not been started for this encounter.      Durable Medical Equipment      Coordination has not been started for this encounter.      Dialysis/Infusion      Coordination has not been started for this encounter.      Home Medical Care      Coordination has not been started for this encounter.      Therapy      Coordination has not been started for this encounter.      Community Resources      Coordination has not been started for this encounter.        Expected Discharge Date and Time     Expected Discharge Date Expected Discharge Time    2020  5:55 PM        Demographic Summary    No documentation.       Functional Status    No documentation.       Psychosocial    No documentation.       Abuse/Neglect    No documentation.       Legal    No documentation.       Substance Abuse    No documentation.       Patient Forms    No documentation.           Ann Rodríguez RN

## 2020-01-23 LAB
BACTERIA SPEC AEROBE CULT: NORMAL
BACTERIA SPEC AEROBE CULT: NORMAL

## 2020-04-06 NOTE — PROGRESS NOTES
Case Management Discharge Note      Final Note: The patient  on 2020 @ 19:20. BAvtar Rodríguez RN, CCP.          Destination      No service has been selected for the patient.      Durable Medical Equipment      No service has been selected for the patient.      Dialysis/Infusion      No service has been selected for the patient.      Home Medical Care      No service has been selected for the patient.      Therapy      No service has been selected for the patient.      Community Resources      No service has been selected for the patient.             Final Discharge Disposition Code: 20 -    Patient/Caregiver provided printed discharge information.